# Patient Record
Sex: FEMALE | Race: WHITE | Employment: OTHER | URBAN - METROPOLITAN AREA
[De-identification: names, ages, dates, MRNs, and addresses within clinical notes are randomized per-mention and may not be internally consistent; named-entity substitution may affect disease eponyms.]

---

## 2018-02-20 ENCOUNTER — HOSPITAL ENCOUNTER (OUTPATIENT)
Dept: CT IMAGING | Age: 73
Discharge: HOME | End: 2018-02-20
Attending: SPECIALIST
Payer: MEDICARE

## 2018-02-20 DIAGNOSIS — I25.10 CARDIOVASCULAR ARTERIOSCLEROSIS: ICD-10-CM

## 2018-02-20 DIAGNOSIS — R10.13 EPIGASTRIC PAIN: ICD-10-CM

## 2018-02-20 DIAGNOSIS — R19.4 CHANGE IN BOWEL HABITS: ICD-10-CM

## 2018-02-20 LAB — CREAT BLD-MCNC: 0.9 MG/DL (ref 0.6–1.3)

## 2018-02-20 PROCEDURE — 74177 CT ABD & PELVIS W/CONTRAST: CPT

## 2018-02-20 PROCEDURE — 82565 ASSAY OF CREATININE: CPT

## 2018-02-20 PROCEDURE — 74011000258 HC RX REV CODE- 258: Performed by: SPECIALIST

## 2018-02-20 PROCEDURE — 74011636320 HC RX REV CODE- 636/320: Performed by: SPECIALIST

## 2018-02-20 RX ORDER — SODIUM CHLORIDE 0.9 % (FLUSH) 0.9 %
10 SYRINGE (ML) INJECTION
Status: COMPLETED | OUTPATIENT
Start: 2018-02-20 | End: 2018-02-20

## 2018-02-20 RX ADMIN — IOPAMIDOL 100 ML: 755 INJECTION, SOLUTION INTRAVENOUS at 15:34

## 2018-02-20 RX ADMIN — IOHEXOL 50 ML: 240 INJECTION, SOLUTION INTRATHECAL; INTRAVASCULAR; INTRAVENOUS; ORAL at 15:34

## 2018-02-20 RX ADMIN — Medication 10 ML: at 15:34

## 2018-02-20 RX ADMIN — SODIUM CHLORIDE 100 ML: 900 INJECTION, SOLUTION INTRAVENOUS at 15:34

## 2018-03-01 ENCOUNTER — OFFICE VISIT (OUTPATIENT)
Dept: CARDIOLOGY CLINIC | Age: 73
End: 2018-03-01

## 2018-03-01 VITALS
OXYGEN SATURATION: 98 % | HEART RATE: 64 BPM | RESPIRATION RATE: 16 BRPM | HEIGHT: 67 IN | SYSTOLIC BLOOD PRESSURE: 130 MMHG | BODY MASS INDEX: 24.17 KG/M2 | WEIGHT: 154 LBS | DIASTOLIC BLOOD PRESSURE: 80 MMHG

## 2018-03-01 DIAGNOSIS — E78.00 HYPERCHOLESTEROLEMIA: ICD-10-CM

## 2018-03-01 DIAGNOSIS — R07.2 CHEST PAIN, PRECORDIAL: Primary | ICD-10-CM

## 2018-03-01 DIAGNOSIS — R94.31 ABNORMAL EKG: ICD-10-CM

## 2018-03-01 DIAGNOSIS — R10.13 EPIGASTRIC ABDOMINAL PAIN: ICD-10-CM

## 2018-03-01 RX ORDER — ATORVASTATIN CALCIUM 20 MG/1
20 TABLET, FILM COATED ORAL DAILY
Qty: 30 TAB | Refills: 5 | Status: SHIPPED | OUTPATIENT
Start: 2018-03-01 | End: 2018-04-04 | Stop reason: SDUPTHER

## 2018-03-01 NOTE — PROGRESS NOTES
[facility] Nuclear stress test ordered  Verbal order per Dr. Herron Favor Pravastatin, changed to Lipitor 20 mg, daily  Verbal order per Dr. Whitehead Blissfield

## 2018-03-01 NOTE — PATIENT INSTRUCTIONS
You will be scheduled for a Lexiscan Nuclear Stress Test after your appointment today. For Nuclear Stress Test:  Please wear comfortable clothing (shorts or pants with a shirt or blouse) and walking/athletic shoes. Do not eat or drink anything, except water, for at least 2 hours prior to your test.    Do take your scheduled medications prior to your test.    Do not use any tobacco products for at least 6 hours before your test.    Do not eat or drink anything containing caffeine including chocolate, regular and decaffeinated coffee, soft drinks or tea for at least 12-24 hours before your test.     There were changes made to your medications at this appointment.  Please note the following:  STOP pravastatin  START Lipitor 20 mg, daily

## 2018-03-01 NOTE — MR AVS SNAPSHOT
[address] [address] 
[phone] Patient: Shadi Majano MRN: [id number] ECF:[id number] Visit Information Date & Time Provider Department Dept. Phone Encounter #  
 3/1/2018  1:40 PM Inna Tate MD [facility] [phone] [id number] Upcoming Health Maintenance Date Due DTaP/Tdap/Td series (1 - Tdap) 8/23/1964 BREAST CANCER SCRN MAMMOGRAM 8/23/1993 FOBT Q 1 YEAR AGE 50-75 8/23/1993 ZOSTER VACCINE AGE 60> 6/23/2003 GLAUCOMA SCREENING Q2Y 8/23/2008 OSTEOPOROSIS SCREENING (DEXA) 8/23/2008 MEDICARE YEARLY EXAM 8/23/2008 Pneumococcal 65+ Low/Medium Risk (2 of 2 - PCV13) 2/18/2017 Influenza Age 5 to Adult 8/1/2017 Allergies as of 3/1/2018  Review Complete On: 3/1/2018 By: Arnold Gray Severity Noted Reaction Type Reactions Latex  09/19/2011    Other (comments) Excoriation of skin Latex  02/18/2016    Rash Adhesive  09/19/2011    Other (comments) Excoriation of skin Adhesive  02/18/2016    Rash Excoriates the skin. Current Immunizations  Reviewed on 4/13/2015 Name Date Influenza Vaccine 10/1/2014 Pneumococcal Polysaccharide (PPSV-23) 2/18/2016 Not reviewed this visit You Were Diagnosed With   
  
 Codes Comments Epigastric abdominal pain    -  Primary ICD-10-CM: R10.13 ICD-9-CM: 789.06 Chest pain, precordial     ICD-10-CM: R07.2 ICD-9-CM: 786.51 Hypercholesterolemia     ICD-10-CM: E78.00 ICD-9-CM: 272.0 Vitals BP Pulse Resp Height(growth percentile) Weight(growth percentile) SpO2  
 130/80 (BP 1 Location: Left arm, BP Patient Position: Sitting) 64 16 5' 7\" (1.702 m) 154 lb (69.9 kg) 98% BMI OB Status Smoking Status 24.12 kg/m2 Hysterectomy Former Smoker BMI and BSA Data Body Mass Index Body Surface Area  
 24.12 kg/m 2 1.82 m 2 Preferred Pharmacy Pharmacy Name Phone [facility][id number]- [place] [facility] [place] [facility] [place] [address] [facility] AT [address] [facility] [phone] Your Updated Medication List  
  
   
This list is accurate as of 3/1/18  3:01 PM.  Always use your most recent med list.  
  
  
  
  
 aspirin delayed-release 81 mg tablet Take  by mouth daily. atorvastatin 20 mg tablet Commonly known as:  LIPITOR Take 1 Tab by mouth daily. Cholecalciferol (Vitamin D3) 2,000 unit Cap capsule Commonly known as:  VITAMIN D3  
  
 * cyanocobalamin 1,000 mcg tablet Take 1,000 mcg by mouth two (2) times a day. * cyanocobalamin 1,000 mcg tablet Commonly known as:  VITAMIN B-12 Take 1 Tab by mouth two (2) times a day. FOLIC ACID PO Take 800 mg by mouth. * hydroCHLOROthiazide 25 mg tablet Commonly known as:  HYDRODIURIL Take 25 mg by mouth daily. * hydroCHLOROthiazide 25 mg tablet Commonly known as:  HYDRODIURIL  
1 tab po daily  
  
 metoprolol tartrate 25 mg tablet Commonly known as:  LOPRESSOR Take 1 Tab by mouth daily. omeprazole 20 mg capsule Commonly known as:  PRILOSEC TargAnox 1.5 billion cell Cap Generic drug:  L. gasseri-B. bifidum-B longum  
  
 potassium chloride 20 mEq tablet Commonly known as:  K-DUR, KLOR-CON Take 20 mEq by mouth daily. sertraline 25 mg tablet Commonly known as:  ZOLOFT Take 3 Tabs by mouth daily. * traZODone 50 mg tablet Commonly known as:  [place] Take  by mouth nightly. * traZODone 50 mg tablet Commonly known as:  [place] Take 1 Tab by mouth nightly. VITAMIN B-6 PO Take 50 mg by mouth. * Notice: This list has 6 medication(s) that are the same as other medications prescribed for you. Read the directions carefully, and ask your doctor or other care provider to review them with you. Prescriptions Sent to Pharmacy  Refills  
 atorvastatin (LIPITOR) 20 mg tablet 5  
 Sig: Take 1 Tab by mouth daily. Class: Normal  
 Pharmacy: [facility]/pharmacy #[id number] [address] AT [address] [place] [address] #: [phone] Route: Oral  
  
We Performed the Following AMB POC EKG ROUTINE W/ 12 LEADS, INTER & REP [33245 CPT(R)] To-Do List   
 03/01/2018 ECG:  STRESS TEST LEXISCAN/CARDIOLITE Patient Instructions You will be scheduled for a Lexiscan Nuclear Stress Test after your appointment today. For Nuclear Stress Test: 
Please wear comfortable clothing (shorts or pants with a shirt or blouse) and walking/athletic shoes. Do not eat or drink anything, except water, for at least 2 hours prior to your test. 
 
Do take your scheduled medications prior to your test. 
 
Do not use any tobacco products for at least 6 hours before your test. 
 
Do not eat or drink anything containing caffeine including chocolate, regular and decaffeinated coffee, soft drinks or tea for at least 12-24 hours before your test.  
 
There were changes made to your medications at this appointment. Please note the following: STOP pravastatin START Lipitor 20 mg, daily Introducing [facility]! Dear Amaris Ahmadi: Thank you for requesting a [employer] account. Our records indicate that you already have an active [employer] account. You can access your account anytime at [email or web address] Did you know that you can access your hospital and ER discharge instructions at any time in [employer]? You can also review all of your test results from your hospital stay or ER visit. Additional Information If you have questions, please visit the Frequently Asked Questions section of the [employer] website at [email or web address] Remember, [employer] is NOT to be used for urgent needs. For medical emergencies, dial 911. Now available from your iPhone and Android! Please provide this summary of care documentation to your next provider. Your primary care clinician is listed as Waleska Ennis. If you have any questions after today's visit, please call [phone].

## 2018-03-01 NOTE — PROGRESS NOTES
Bonifacio Cotton [facility]     1945       Sage Pantoja MD, [facility] [place]  Date of Visit-3/1/2018   PCP is Micah Gonzales MD   [facility] [place]  [facility]  HPI:  Sammie Little is a 74 y.o. female   Referred by GI for pain. The pt states that she went to her GI doctor 2 weeks ago for abdominal pain. The pt states that she then went to have a CT scan and was told to come here. She reports that she also gets chest pain. This chest pain is intermittent and happens about once or twice a week. This lasts for about 10-15 minutes. The pt reports that when going up the steps she does get short of breath and also gets chest pain after this. She describes this pain as feeling like someone punched her in the stomach. The pt states that this pain is constant. She is not sure that this pain has radiated into her back or jaw. The pt reports that GI has not been able to figure out the etiology of this pain which is why she was referred here. The pt states that she will be getting an endoscopy at some point. She reports that she is not walking much because of her knee pain and degenerative disc disease. The pt states that she has had stress testing in 2005 and 2014. She reports that she is taking cholesterol medication. The pt is taking pravachol and is not sure that she has been on any other cholesterol medications. Denies edema, syncope or shortness of breath at rest, has no tachycardia, palpitations or sense of arrhythmia. EKG: Sinus rhythm with first degree AV block and late R wave with ST changes. Assessment/Plan:     1. Chest pain intermittent, not related to exertion but some WOLFF related to exertion intermediate probability will plan for a stress test. Will have difficulty exercising due to back and knee problems     2. Hypercholesterolemia, LDL is 131 on pravastatin will change to lipitor 20 mg daily.      3. GI symptoms endoscopy planned w/ GI    No future appointments. Patient Instructions   You will be scheduled for a Lexiscan Nuclear Stress Test after your appointment today. There were changes made to your medications at this appointment. Please note the following:  STOP pravastatin  START Lipitor 20 mg, daily         Key CAD CHF Meds             atorvastatin (LIPITOR) 20 mg tablet  (Taking) Take 1 Tab by mouth daily. metoprolol tartrate (LOPRESSOR) 25 mg tablet  (Taking) Take 1 Tab by mouth daily. aspirin delayed-release 81 mg tablet  (Taking) Take  by mouth daily. hydrochlorothiazide (HYDRODIURIL) 25 mg tablet  (Taking) 1 tab po daily    hydrochlorothiazide (HYDRODIURIL) 25 mg tablet  (Taking) Take 25 mg by mouth daily. Impression:   1. Epigastric abdominal pain    2. Chest pain, precordial    3. Hypercholesterolemia     has living will  Allergy to latex  Father had skins cancer  borther NHL  Father with CHF, brother with mitral valve dz  HTN in mother and father  Drinks cocktail most nights, for 55 years, quit tobacco 2008 was 1ppd      Review of Systems   Constitutional: Negative for weight loss. HENT: Negative for ear pain, nosebleeds and sore throat. Eyes: Negative for blurred vision and double vision. Respiratory: Positive for shortness of breath. Negative for cough, hemoptysis, sputum production and wheezing. Cardiovascular: Positive for chest pain. Negative for palpitations. Gastrointestinal: Positive for diarrhea and heartburn. Negative for abdominal pain, blood in stool, constipation, nausea and vomiting. Genitourinary: Negative for dysuria and frequency. Musculoskeletal: Positive for back pain and joint pain. Negative for neck pain. Skin: Negative for rash. Neurological: Negative for dizziness, sensory change, focal weakness, seizures, loss of consciousness and headaches. Psychiatric/Behavioral: Positive for depression. The patient has insomnia. The patient is not nervous/anxious.      see supplement sheet, initialed and to be scanned by staff  Past Medical History:   Diagnosis Date    Arthritis     DJD    Cancer ([facility] [address]     skin cancer - squamous and basal cell    Cerebellar ataxia (HCC)     GERD (gastroesophageal reflux disease)     History of degenerative disc disease     Hypercholesterolemia     Hypertension     Orthostasis 5/13/2014    Other and unspecified symptoms and signs involving general sensations and perceptions     previous hx \"cerebellar ataxia\"    Other ill-defined conditions(799.89)     increased cholesterol    Other ill-defined conditions(799.89)     fibromyalgia    Psychiatric disorder     depression/anxiety      Social Hx= reports that she quit smoking about 10 years ago. She has never used smokeless tobacco. She reports that she does not drink alcohol or use illicit drugs. Exam and Labs:  /80 (BP 1 Location: Left arm, BP Patient Position: Sitting)  Pulse 64  Resp 16  Ht 5' 7\" (1.702 m)  Wt 154 lb (69.9 kg)  SpO2 98%  BMI 24.12 kg/z3Bdvwndsinnpuup:  NAD, comfortable  Head: NC,AT. Eyes: No scleral icterus. Neck:  Neck supple. No JVD present. Throat: moist mucous membranes. Chest: Effort normal & normal respiratory excursion . Neurological: alert, conversant and oriented . Skin: Skin is not cold. No obvious systemic rash noted. Not diaphoretic. No erythema. Psychiatric:  Grossly normal mood and affect. Behavior appears normal. Extremities:  no clubbing or cyanosis. Abdomen: non distended    Lungs:breath sounds normal. No stridor. distress, wheezes or  Rales. Heart: normal rate, regular rhythm, normal S1, S2, no murmurs, rubs, clicks or gallops , PMI non displaced. Edema: Edema is none.   Lab Results   Component Value Date/Time    Cholesterol, total 182 02/18/2016 04:05 PM    HDL Cholesterol 78 02/18/2016 04:05 PM    LDL, calculated 83 02/18/2016 04:05 PM    Triglyceride 106 02/18/2016 04:05 PM    CHOL/HDL Ratio 2.4 05/13/2014 04:15 AM     Lab Results   Component Value Date/Time    Sodium 141 02/18/2016 04:05 PM    Potassium 5.1 02/18/2016 04:05 PM    Chloride 100 02/18/2016 04:05 PM    CO2 27 02/18/2016 04:05 PM    Anion gap 7 06/28/2015 07:30 PM    Glucose 102 (H) 02/18/2016 04:05 PM    BUN 12 02/18/2016 04:05 PM    Creatinine 0.88 02/18/2016 04:05 PM    BUN/Creatinine ratio 14 02/18/2016 04:05 PM    GFR est AA 76 02/18/2016 04:05 PM    GFR est non-AA 66 02/18/2016 04:05 PM    Calcium 9.1 02/18/2016 04:05 PM      Wt Readings from Last 3 Encounters:   03/01/18 154 lb (69.9 kg)   03/04/16 149 lb (67.6 kg)   02/18/16 154 lb (69.9 kg)      BP Readings from Last 3 Encounters:   03/01/18 130/80   03/04/16 140/78   02/18/16 145/79      Current Outpatient Prescriptions   Medication Sig    traZODone (DESYREL) 50 mg tablet Take 1 Tab by mouth nightly.  metoprolol tartrate (LOPRESSOR) 25 mg tablet Take 1 Tab by mouth daily.  omeprazole (PRILOSEC) 20 mg capsule     [employer] 1.5 billion cell cap     aspirin delayed-release 81 mg tablet Take  by mouth daily.  cyanocobalamin 1,000 mcg tablet Take 1,000 mcg by mouth two (2) times a day.  FOLIC ACID PO Take 827 mg by mouth.  PYRIDOXINE HCL (VITAMIN B-6 PO) Take 50 mg by mouth.  Cholecalciferol, Vitamin D3, (VITAMIN D3) 2,000 unit cap capsule     sertraline (ZOLOFT) 25 mg tablet Take 3 Tabs by mouth daily.  hydrochlorothiazide (HYDRODIURIL) 25 mg tablet 1 tab po daily    traZODone (DESYREL) 50 mg tablet Take  by mouth nightly.  hydrochlorothiazide (HYDRODIURIL) 25 mg tablet Take 25 mg by mouth daily.  cyanocobalamin (VITAMIN B-12) 1,000 mcg tablet Take 1 Tab by mouth two (2) times a day.  potassium chloride (K-DUR, KLOR-CON) 20 mEq tablet Take 20 mEq by mouth daily.  pravastatin (PRAVACHOL) 40 mg tablet Take 40 mg by mouth nightly. No current facility-administered medications for this visit. Impression see above.       Written by Saloni Johns, as dictated by Jenn Rajput Concepción Larkin MD.

## 2018-03-13 ENCOUNTER — CLINICAL SUPPORT (OUTPATIENT)
Dept: CARDIOLOGY CLINIC | Age: 73
End: 2018-03-13

## 2018-03-13 DIAGNOSIS — E78.00 HYPERCHOLESTEROLEMIA: ICD-10-CM

## 2018-03-13 DIAGNOSIS — R06.02 SHORTNESS OF BREATH: ICD-10-CM

## 2018-03-13 DIAGNOSIS — R07.9 CHEST PAIN, UNSPECIFIED TYPE: ICD-10-CM

## 2018-03-13 DIAGNOSIS — R10.13 EPIGASTRIC ABDOMINAL PAIN: ICD-10-CM

## 2018-03-13 DIAGNOSIS — R07.2 CHEST PAIN, PRECORDIAL: ICD-10-CM

## 2018-03-13 NOTE — PROGRESS NOTES
See scanned document. Ordering Doctor:Dr. Donna Olguin  Reading Doctor:Dr. Donna Olguin    Patient tolerated procedure well.

## 2018-03-25 PROBLEM — R94.31 ABNORMAL EKG: Status: ACTIVE | Noted: 2018-03-25

## 2018-04-04 RX ORDER — ATORVASTATIN CALCIUM 20 MG/1
20 TABLET, FILM COATED ORAL DAILY
Qty: 90 TAB | Refills: 1 | Status: SHIPPED | OUTPATIENT
Start: 2018-04-04 | End: 2018-04-19 | Stop reason: SDUPTHER

## 2018-04-04 NOTE — TELEPHONE ENCOUNTER
Request for Atorvastatin 20 mg tab, nightly. Last office visit 3/1/18,   Next office visit none scheduled. 90 day supply requested     Refills per verbal order from Dr. Reno Armstrong.

## 2018-04-10 ENCOUNTER — ANESTHESIA (OUTPATIENT)
Dept: ENDOSCOPY | Age: 73
End: 2018-04-10
Payer: MEDICARE

## 2018-04-10 ENCOUNTER — HOSPITAL ENCOUNTER (OUTPATIENT)
Age: 73
Setting detail: OUTPATIENT SURGERY
Discharge: HOME | End: 2018-04-10
Attending: SPECIALIST | Admitting: SPECIALIST
Payer: MEDICARE

## 2018-04-10 ENCOUNTER — ANESTHESIA EVENT (OUTPATIENT)
Dept: ENDOSCOPY | Age: 73
End: 2018-04-10
Payer: MEDICARE

## 2018-04-10 VITALS
TEMPERATURE: 98.2 F | RESPIRATION RATE: 14 BRPM | BODY MASS INDEX: 23.99 KG/M2 | HEART RATE: 69 BPM | WEIGHT: 153.2 LBS | SYSTOLIC BLOOD PRESSURE: 123 MMHG | OXYGEN SATURATION: 97 % | DIASTOLIC BLOOD PRESSURE: 70 MMHG

## 2018-04-10 PROCEDURE — 76060000031 HC ANESTHESIA FIRST 0.5 HR: Performed by: SPECIALIST

## 2018-04-10 PROCEDURE — 36415 COLL VENOUS BLD VENIPUNCTURE: CPT | Performed by: SPECIALIST

## 2018-04-10 PROCEDURE — 77030009426 HC FCPS BIOP ENDOSC BSC -B: Performed by: SPECIALIST

## 2018-04-10 PROCEDURE — 82784 ASSAY IGA/IGD/IGG/IGM EACH: CPT | Performed by: SPECIALIST

## 2018-04-10 PROCEDURE — 88342 IMHCHEM/IMCYTCHM 1ST ANTB: CPT | Performed by: SPECIALIST

## 2018-04-10 PROCEDURE — 88305 TISSUE EXAM BY PATHOLOGIST: CPT | Performed by: SPECIALIST

## 2018-04-10 PROCEDURE — 74011250636 HC RX REV CODE- 250/636

## 2018-04-10 PROCEDURE — 74011000250 HC RX REV CODE- 250

## 2018-04-10 PROCEDURE — 76040000019: Performed by: SPECIALIST

## 2018-04-10 RX ORDER — LORAZEPAM 2 MG/ML
2 INJECTION INTRAMUSCULAR AS NEEDED
Status: CANCELLED | OUTPATIENT
Start: 2018-04-10 | End: 2018-04-10

## 2018-04-10 RX ORDER — DEXTROMETHORPHAN/PSEUDOEPHED 2.5-7.5/.8
1.2 DROPS ORAL
Status: CANCELLED | OUTPATIENT
Start: 2018-04-10

## 2018-04-10 RX ORDER — FENTANYL CITRATE 50 UG/ML
200 INJECTION, SOLUTION INTRAMUSCULAR; INTRAVENOUS
Status: CANCELLED | OUTPATIENT
Start: 2018-04-10 | End: 2018-04-10

## 2018-04-10 RX ORDER — SODIUM CHLORIDE 0.9 % (FLUSH) 0.9 %
5-10 SYRINGE (ML) INJECTION AS NEEDED
Status: CANCELLED | OUTPATIENT
Start: 2018-04-10 | End: 2018-04-10

## 2018-04-10 RX ORDER — NALOXONE HYDROCHLORIDE 0.4 MG/ML
0.4 INJECTION, SOLUTION INTRAMUSCULAR; INTRAVENOUS; SUBCUTANEOUS
Status: CANCELLED | OUTPATIENT
Start: 2018-04-10 | End: 2018-04-10

## 2018-04-10 RX ORDER — SODIUM CHLORIDE 9 MG/ML
50 INJECTION, SOLUTION INTRAVENOUS CONTINUOUS
Status: CANCELLED | OUTPATIENT
Start: 2018-04-10 | End: 2018-04-10

## 2018-04-10 RX ORDER — ATROPINE SULFATE 0.1 MG/ML
0.5 INJECTION INTRAVENOUS
Status: CANCELLED | OUTPATIENT
Start: 2018-04-10 | End: 2018-04-10

## 2018-04-10 RX ORDER — OXYBUTYNIN CHLORIDE 5 MG/1
5 TABLET ORAL 3 TIMES DAILY
COMMUNITY
End: 2019-06-11

## 2018-04-10 RX ORDER — SODIUM CHLORIDE 0.9 % (FLUSH) 0.9 %
5-10 SYRINGE (ML) INJECTION EVERY 8 HOURS
Status: CANCELLED | OUTPATIENT
Start: 2018-04-10 | End: 2018-04-10

## 2018-04-10 RX ORDER — EPINEPHRINE 0.1 MG/ML
1 INJECTION INTRACARDIAC; INTRAVENOUS
Status: CANCELLED | OUTPATIENT
Start: 2018-04-10 | End: 2018-04-10

## 2018-04-10 RX ORDER — GLYCOPYRROLATE 0.2 MG/ML
INJECTION INTRAMUSCULAR; INTRAVENOUS AS NEEDED
Status: DISCONTINUED | OUTPATIENT
Start: 2018-04-10 | End: 2018-04-10 | Stop reason: HOSPADM

## 2018-04-10 RX ORDER — MIDAZOLAM HYDROCHLORIDE 1 MG/ML
.25-1 INJECTION, SOLUTION INTRAMUSCULAR; INTRAVENOUS
Status: CANCELLED | OUTPATIENT
Start: 2018-04-10 | End: 2018-04-10

## 2018-04-10 RX ORDER — LIDOCAINE HYDROCHLORIDE 20 MG/ML
INJECTION, SOLUTION EPIDURAL; INFILTRATION; INTRACAUDAL; PERINEURAL AS NEEDED
Status: DISCONTINUED | OUTPATIENT
Start: 2018-04-10 | End: 2018-04-10 | Stop reason: HOSPADM

## 2018-04-10 RX ORDER — PROPOFOL 10 MG/ML
INJECTION, EMULSION INTRAVENOUS AS NEEDED
Status: DISCONTINUED | OUTPATIENT
Start: 2018-04-10 | End: 2018-04-10 | Stop reason: HOSPADM

## 2018-04-10 RX ORDER — DEXMEDETOMIDINE HYDROCHLORIDE 4 UG/ML
INJECTION, SOLUTION INTRAVENOUS AS NEEDED
Status: DISCONTINUED | OUTPATIENT
Start: 2018-04-10 | End: 2018-04-10 | Stop reason: HOSPADM

## 2018-04-10 RX ORDER — SODIUM CHLORIDE 9 MG/ML
INJECTION, SOLUTION INTRAVENOUS
Status: DISCONTINUED | OUTPATIENT
Start: 2018-04-10 | End: 2018-04-10 | Stop reason: HOSPADM

## 2018-04-10 RX ORDER — FLUMAZENIL 0.1 MG/ML
0.2 INJECTION INTRAVENOUS
Status: CANCELLED | OUTPATIENT
Start: 2018-04-10 | End: 2018-04-10

## 2018-04-10 RX ADMIN — LIDOCAINE HYDROCHLORIDE 80 MG: 20 INJECTION, SOLUTION EPIDURAL; INFILTRATION; INTRACAUDAL; PERINEURAL at 13:53

## 2018-04-10 RX ADMIN — GLYCOPYRROLATE 0.2 MG: 0.2 INJECTION INTRAMUSCULAR; INTRAVENOUS at 13:53

## 2018-04-10 RX ADMIN — DEXMEDETOMIDINE HYDROCHLORIDE 10 MCG: 4 INJECTION, SOLUTION INTRAVENOUS at 13:54

## 2018-04-10 RX ADMIN — PROPOFOL 50 MG: 10 INJECTION, EMULSION INTRAVENOUS at 13:53

## 2018-04-10 RX ADMIN — DEXMEDETOMIDINE HYDROCHLORIDE 10 MCG: 4 INJECTION, SOLUTION INTRAVENOUS at 13:53

## 2018-04-10 RX ADMIN — SODIUM CHLORIDE: 9 INJECTION, SOLUTION INTRAVENOUS at 13:35

## 2018-04-10 RX ADMIN — PROPOFOL 50 MG: 10 INJECTION, EMULSION INTRAVENOUS at 13:58

## 2018-04-10 RX ADMIN — PROPOFOL 50 MG: 10 INJECTION, EMULSION INTRAVENOUS at 13:56

## 2018-04-10 RX ADMIN — PROPOFOL 50 MG: 10 INJECTION, EMULSION INTRAVENOUS at 14:00

## 2018-04-10 NOTE — ANESTHESIA POSTPROCEDURE EVALUATION
Post-Anesthesia Evaluation and Assessment    Patient: Ralf Sanchez MRN: [id number]  SSN: [id number]    YOB: 1945  Age: 74 y.o. Sex: female       Cardiovascular Function/Vital Signs  Visit Vitals    /76    Pulse 75    Temp 36.8 °C (98.2 °F)    Resp 16    Wt 69.5 kg (153 lb 3.2 oz)    SpO2 94%    BMI 23.99 kg/m2       Patient is status post MAC anesthesia for Procedure(s):  ESOPHAGOGASTRODUODENOSCOPY (EGD)  ESOPHAGOGASTRODUODENAL (EGD) BIOPSY. Nausea/Vomiting: None    Postoperative hydration reviewed and adequate. Pain:  Pain Scale 1: Numeric (0 - 10) (04/10/18 1440)  Pain Intensity 1: 0 (04/10/18 1440)   Managed    Neurological Status: At baseline    Mental Status and Level of Consciousness: Arousable    Pulmonary Status:   O2 Device: Room air (04/10/18 1440)   Adequate oxygenation and airway patent    Complications related to anesthesia: None    Post-anesthesia assessment completed.  No concerns    Signed By: Ori Enriquez MD     April 10, 2018

## 2018-04-10 NOTE — PROCEDURES
EGD Procedure Note    Indications:  Abdominal pain, epigastric, GERD, Hx of gastroparesis, diarrhea   Referring Physician: Nicky Lorenz MD   Anesthesia/Sedation:MAC  Endoscopist:  Dr. Juany Fox  Assistant:  Endoscopy Technician-1: Terrence Rhodes  Endoscopy RN-1: Geovanna Kaba RN    Preoperative diagnosis: egd    Postoperative diagnosis: Hiatal Hernia  Atrophic gastritis      Procedure in Detail:  Informed consent was obtained for the procedure, including sedation. Risks of perforation, hemorrhage, adverse drug reaction, and aspiration were discussed. The patient was placed in the left lateral decubitus position. Based on the pre-procedure assessment, including review of the patient's medical history, medications, allergies, and review of systems, she had been deemed to be an appropriate candidate for moderate sedation; she was therefore sedated with the medications listed above. The patient was monitored continuously with ECG tracing, pulse oximetry, blood pressure monitoring, and direct observations. An Olympus video endoscope was inserted into the mouth and advanced under direct vision to into the esophagus, then stomach and duodenum. A careful inspection was made as the gastroscope was withdrawn, including a retroflexed view of the proximal stomach; findings and interventions are described below. Findings:   Esophagus:tortuous  Stomach: 6-7 cm hiatal hernia, mild atrophic gastritis - Bx from antrum and body -stain for H.pylori  Duodenum/jejunum: small duodenal diverticulum    Therapies:  See above    Specimens: see above           EBL: None    Complications:   None; patient tolerated the procedure well. Recommendations:  -Acid suppression with a proton pump inhibitor. , -Await pathology. , -Await RAFAELA test result and treat for Helicobacter pylori if positive. , -Follow up with me., -No NSAIDS, -call office to do stool studies, schedule for colonoscopy    Trupti Wilde Negro Dickson MD

## 2018-04-10 NOTE — IP AVS SNAPSHOT
[address] 
[phone] Patient: Tadeo Hager MRN: [id number] About your hospitalization You were admitted on:  April 10, 2018 You last received care in the:  [facility] ENDOSCOPY You were discharged on:  April 10, 2018 Why you were hospitalized Your primary diagnosis was:  Not on File Follow-up Information None Discharge Orders None A check lenore indicates which time of day the medication should be taken. My Medications CHANGE how you take these medications Instructions Each Dose to Equal  
 Morning Noon Evening Bedtime  
 sertraline 25 mg tablet Commonly known as:  ZOLOFT What changed:  how much to take Your last dose was: Your next dose is: Take 3 Tabs by mouth daily. 75 mg CONTINUE taking these medications Instructions Each Dose to Equal  
 Morning Noon Evening Bedtime  
 aspirin delayed-release 81 mg tablet Your last dose was: Your next dose is: Take  by mouth daily. atorvastatin 20 mg tablet Commonly known as:  LIPITOR Your last dose was: Your next dose is: Take 1 Tab by mouth daily. 20 mg Cholecalciferol (Vitamin D3) 2,000 unit Cap capsule Commonly known as:  VITAMIN D3 Your last dose was: Your next dose is: * cyanocobalamin 1,000 mcg tablet Your last dose was: Your next dose is: Take 1,000 mcg by mouth two (2) times a day. 1000 mcg * cyanocobalamin 1,000 mcg tablet Commonly known as:  VITAMIN B-12 Your last dose was: Your next dose is: Take 1 Tab by mouth two (2) times a day. 1000 mcg FOLIC ACID PO Your last dose was: Your next dose is: Take 800 mg by mouth. 800 mg  
    
   
   
   
  
 * hydroCHLOROthiazide 25 mg tablet Commonly known as:  HYDRODIURIL Your last dose was: Your next dose is: Take 25 mg by mouth daily. 25 mg  
    
   
   
   
  
 * hydroCHLOROthiazide 25 mg tablet Commonly known as:  HYDRODIURIL Your last dose was: Your next dose is:    
   
   
 1 tab po daily  
     
   
   
   
  
 metoprolol tartrate 25 mg tablet Commonly known as:  LOPRESSOR Your last dose was: Your next dose is: Take 1 Tab by mouth daily. 25 mg  
    
   
   
   
  
 omeprazole 20 mg capsule Commonly known as:  PRILOSEC Your last dose was: Your next dose is:    
   
   
      
   
   
   
  
 oxybutynin 5 mg tablet Commonly known as:  CLHNUEOE Your last dose was: Your next dose is: Take 5 mg by mouth three (3) times daily. 5 mg [employer] 1.5 billion cell Cap Generic drug:  L. gasseri-B. bifidum-B longum Your last dose was: Your next dose is:    
   
   
      
   
   
   
  
 potassium chloride 20 mEq tablet Commonly known as:  K-DUR, KLOR-CON Your last dose was: Your next dose is: Take 20 mEq by mouth daily. 20 mEq * traZODone 50 mg tablet Commonly known as:  Ariana Aguirre Your last dose was: Your next dose is: Take  by mouth nightly. * traZODone 50 mg tablet Commonly known as:  Ariana Aguirre Your last dose was: Your next dose is: Take 1 Tab by mouth nightly. 50 mg  
    
   
   
   
  
 VITAMIN B-6 PO Your last dose was: Your next dose is: Take 50 mg by mouth. 50 mg  
    
   
   
   
  
 * Notice:   This list has 6 medication(s) that are the same as other medications prescribed for you. Read the directions carefully, and ask your doctor or other care provider to review them with you. Discharge Instructions Tess Jamil [id number] 
1945 EGD DISCHARGE INSTRUCTIONS Discomfort: 
Sore throat- throat lozenges or warm salt water gargle 
redness at IV site- apply warm compress to area; if redness or soreness persist- contact your physician Gaseous discomfort- walking, belching will help relieve any discomfort You may not operate a vehicle for 12 hours You may not engage in an occupation involving machinery or appliances for rest of today. You may not drink alcoholic beverages for at least 12 hours Avoid making any critical decisions for at least 24 hour DIET You may resume your regular diet  however -  remember your colon is empty and a heavy meal will produce gas. Avoid these foods:  vegetables, fried / greasy foods, carbonated drinks ACTIVITY You may resume your normal daily activities. Spend the remainder of the day resting -  avoid any strenuous activity. CALL M.D. ANY SIGN OF Increasing pain, nausea, vomiting Abdominal distension (swelling) New increased bleeding (oral or rectal) Fever (chills) Pain in chest area Bloody discharge from nose or mouth Shortness of breath You may not take any Advil, Aspirin, Ibuprofen, Motrin, Aleve, or Goodys, ONLY  Tylenol as needed for pain. Follow-up Instructions: 
 Call Dr. Soto Both office to make appointment Office telephone for problems or questions [phone] Results of procedure / biopsy in 10-14 days  
-Acid suppression with a proton pump inhibitor. , -Await pathology. , -Await RAFAELA test result and treat for Helicobacter pylori if positive. , -Follow up with me., -No NSAIDS, -call office to do stool studies,schedule UGI, X-rays schedule for colonoscopy Introducing [facility]! [facility]: Thank you for requesting a [employer] account. Our records indicate that you already have an active [employer] account. You can access your account anytime at [email or web address]. [employer] Did you know that you can access your hospital and ER discharge instructions at any time in [employer]? You can also review all of your test results from your hospital stay or ER visit. Additional Information If you have questions, please visit the Frequently Asked Questions section of the [employer] website at [email or web address] [employer]/. Remember, [employer] is NOT to be used for urgent needs. For medical emergencies, dial 911. Now available from your iPhone and Android! Introducing Gopal Arriola As a [employer] patient, I wanted to make you aware of our electronic visit tool called Gopal Arriola. [employer] 24/7 allows you to connect within minutes with a medical provider 24 hours a day, seven days a week via a mobile device or tablet or logging into a secure website from your computer. You can access Gopal Onofrefin from anywhere in the [place]. A virtual visit might be right for you when you have a simple condition and feel like you just dont want to get out of bed, or cant get away from work for an appointment, when your regular [employer] provider is not available (evenings, weekends or holidays), or when youre out of town and need minor care. Electronic visits cost only $49 and if the [employer] 24/7 provider determines a prescription is needed to treat your condition, one can be electronically transmitted to a nearby pharmacy*. Please take a moment to enroll today if you have not already done so. The enrollment process is free and takes just a few minutes. To enroll, please download the [employer] 24/7 alfredo to your tablet or phone, or visit [email or web address] to enroll on your computer. And, as an [address] [place] [address] patient with a [employer] account, the results of your visits will be scanned into your electronic medical record and your primary care provider will be able to view the scanned results. We urge you to continue to see your regular [employer] provider for your ongoing medical care. And while your primary care provider may not be the one available when you seek a Gopal Onofrefin virtual visit, the peace of mind you get from getting a real diagnosis real time can be priceless. For more information on Gopal SoPostclifffin, view our Frequently Asked Questions (FAQs) at [email or web address]. Sincerely, 
 
Marci Zamora MD 
Chief Medical Officer Deric Sammie Benitez *:  certain medications cannot be prescribed via [employer] Providers Seen During Your Hospitalization Provider Specialty Primary office phone Debra Morales MD Gastroenterology [phone] Your Primary Care Physician (PCP) Primary Care Physician Office Phone Office Fax [address] [address] [phone] You are allergic to the following Allergen Reactions Latex Other (comments) Excoriation of skin Latex Rash Adhesive Other (comments) Excoriation of skin Adhesive Rash Excoriates the skin. Recent Documentation Weight BMI OB Status Smoking Status 69.5 kg 23.99 kg/m2 Hysterectomy Former Smoker Emergency Contacts Name Discharge Info Relation Home Work Mobile RobsonmichelaWilliam DISCHARGE CAREGIVER [3] Child [2] [phone] Patient Belongings The following personal items are in your possession at time of discharge: 
  Dental Appliances: At bedside  Visual Aid: None Please provide this summary of care documentation to your next provider.  
  
  
 
  
Signatures-by signing, you are acknowledging that this After Visit Summary has been reviewed with you and you have received a copy. Patient Signature:  ____________________________________________________________ Date:  ____________________________________________________________  
  
Dewane Salen Provider Signature:  ____________________________________________________________ Date:  ____________________________________________________________

## 2018-04-10 NOTE — ROUTINE PROCESS
Josie Singh  1945  [id number]    Situation:  Verbal report received from: Tj Reed RN  Procedure: Procedure(s):  ESOPHAGOGASTRODUODENOSCOPY (EGD)  ESOPHAGOGASTRODUODENAL (EGD) BIOPSY    Background:    Preoperative diagnosis: egd  Postoperative diagnosis: Hiatal Hernia  Atrophic gastritis    :  Dr. Rosa Churchill  Assistant(s): Endoscopy Technician-1: Kris Reese  Endoscopy RN-1: Talha Summers RN    Specimens:   ID Type Source Tests Collected by Time Destination   1 : Antrum bx (stain for h pylori) Arabella Cuellar MD 4/10/2018 1404 Pathology   2 : Gastric body bx (stain for h pylori) Arabella Cuellar MD 4/10/2018 1408 Pathology     H. Pylori  no    Assessment:  Intra-procedure medications   Anesthesia gave intra-procedure sedation and medications, see anesthesia flow sheet yes    Intravenous fluids: NS@ KVO     Vital signs stable     Abdominal assessment: round and soft     Recommendation:  Discharge patient per MD order.   Family   Permission to share finding with family or friend yes

## 2018-04-10 NOTE — H&P
Pre-endoscopy H and P    The patient was seen and examined. The airway was assessed and documented. The problem list, past medical history, and medications were reviewed. Patient Active Problem List   Diagnosis Code    MDD (major depressive disorder), recurrent, in partial remission ([facility] F33.41    Falls W19. XXXA    Encephalopathy, unspecified G93.40    Orthostasis I95.1    Brachial neuritis or radiculitis NOS M54.12    Abnormal EKG R94.31     Social History     Social History    Marital status:      Spouse name: N/A    Number of children: N/A    Years of education: N/A     Occupational History    Not on file. Social History Main Topics    Smoking status: Former Smoker     Quit date: 1/19/2008    Smokeless tobacco: Never Used      Comment: quit smoking cigarettes    Alcohol use No    Drug use: No    Sexual activity: Not on file     Other Topics Concern    Not on file     Social History Narrative    ** Merged History Encounter **          Past Medical History:   Diagnosis Date    Arthritis     DJD    Cancer ([facility]     skin cancer - squamous and basal cell    Cerebellar ataxia (HCC)     GERD (gastroesophageal reflux disease)     History of degenerative disc disease     Hypercholesterolemia     Lexiscan 3-13-18 WNL EF 80% [facility]    Hypertension     Orthostasis 5/13/2014    Other and unspecified symptoms and signs involving general sensations and perceptions     previous hx \"cerebellar ataxia\"    Other ill-defined conditions(799.89)     fibromyalgia    Psychiatric disorder     depression/anxiety     The patient has a family history of na    Prior to Admission Medications   Prescriptions Last Dose Informant Patient Reported? Taking? Cholecalciferol, Vitamin D3, (VITAMIN D3) 2,000 unit cap capsule 4/9/2018 at Unknown time  Yes Yes   FOLIC ACID PO 2/4/6176 at Unknown time  Yes Yes   Sig: Take 800 mg by mouth.    [employer] 1.5 billion cell cap 4/9/2018 at Unknown time  Yes Yes   PYRIDOXINE HCL (VITAMIN B-6 PO) 2018 at Unknown time  Yes Yes   Sig: Take 50 mg by mouth. aspirin delayed-release 81 mg tablet 2018 at Unknown time  Yes Yes   Sig: Take  by mouth daily. atorvastatin (LIPITOR) 20 mg tablet 2018 at Unknown time  No Yes   Sig: Take 1 Tab by mouth daily. cyanocobalamin (VITAMIN B-12) 1,000 mcg tablet 2018 at Unknown time  No Yes   Sig: Take 1 Tab by mouth two (2) times a day. cyanocobalamin 1,000 mcg tablet 2018 at Unknown time  Yes Yes   Sig: Take 1,000 mcg by mouth two (2) times a day. hydrochlorothiazide (HYDRODIURIL) 25 mg tablet 2018 at Unknown time  Yes Yes   Sig: Take 25 mg by mouth daily. hydrochlorothiazide (HYDRODIURIL) 25 mg tablet   No No   Si tab po daily   metoprolol tartrate (LOPRESSOR) 25 mg tablet 2018 at Unknown time  No Yes   Sig: Take 1 Tab by mouth daily. omeprazole (PRILOSEC) 20 mg capsule 2018 at Unknown time  Yes Yes   oxybutynin (DITROPAN) 5 mg tablet 2018 at Unknown time  Yes Yes   Sig: Take 5 mg by mouth three (3) times daily. potassium chloride (K-DUR, KLOR-CON) 20 mEq tablet 2018 at Unknown time  Yes Yes   Sig: Take 20 mEq by mouth daily. sertraline (ZOLOFT) 25 mg tablet 2018 at Unknown time  No Yes   Sig: Take 3 Tabs by mouth daily. Patient taking differently: Take 100 mg by mouth daily. traZODone (DESYREL) 50 mg tablet 2018 at Unknown time  Yes Yes   Sig: Take  by mouth nightly. traZODone (DESYREL) 50 mg tablet   No No   Sig: Take 1 Tab by mouth nightly. Facility-Administered Medications: None         The review of systems is:  negative for shortness of breath or chest pain      The heart, lungs and mental status were satisfactory for the administration of MAC sedation and for the procedure. Mallampati score: See Anesthesia. I discussed with the patient the objectives, risks, consequences and alternatives to the procedure.       Plan: Endoscopic procedure with MAC sedation.     Teja Núñez MD  4/10/2018  1:45 PM

## 2018-04-10 NOTE — DISCHARGE INSTRUCTIONS
Tadeo Hager  [id number]  1945    EGD DISCHARGE INSTRUCTIONS  Discomfort:  Sore throat- throat lozenges or warm salt water gargle  redness at IV site- apply warm compress to area; if redness or soreness persist- contact your physician  Gaseous discomfort- walking, belching will help relieve any discomfort  You may not operate a vehicle for 12 hours  You may not engage in an occupation involving machinery or appliances for rest of today. You may not drink alcoholic beverages for at least 12 hours  Avoid making any critical decisions for at least 24 hour  DIET  You may resume your regular diet - however -  remember your colon is empty and a heavy meal will produce gas. Avoid these foods:  vegetables, fried / greasy foods, carbonated drinks  ACTIVITY  You may resume your normal daily activities. Spend the remainder of the day resting -  avoid any strenuous activity. CALL M.D. ANY SIGN OF   Increasing pain, nausea, vomiting  Abdominal distension (swelling)  New increased bleeding (oral or rectal)  Fever (chills)  Pain in chest area  Bloody discharge from nose or mouth  Shortness of breath    You may not take any Advil, Aspirin, Ibuprofen, Motrin, Aleve, or Goodys, ONLY  Tylenol as needed for pain. Follow-up Instructions:   Call Dr. Goldstein Late office to make appointment  Office telephone for problems or questions [phone]  Results of procedure / biopsy in 10-14 days   -Acid suppression with a proton pump inhibitor. , -Await pathology. , -Await RAFAELA test result and treat for Helicobacter pylori if positive. , -Follow up with me., -No NSAIDS, -call office to do stool studies,schedule UGI, X-rays schedule for colonoscopy

## 2018-04-10 NOTE — ANESTHESIA PREPROCEDURE EVALUATION
Anesthetic History               Review of Systems / Medical History  Patient summary reviewed, nursing notes reviewed and pertinent labs reviewed    Pulmonary                   Neuro/Psych         Psychiatric history     Cardiovascular    Hypertension: well controlled              Exercise tolerance: >4 METS  Comments: Normal nuc stress test   GI/Hepatic/Renal     GERD           Endo/Other        Arthritis     Other Findings            Physical Exam    Airway  Mallampati: I  TM Distance: > 6 cm  Neck ROM: normal range of motion   Mouth opening: Normal     Cardiovascular    Rhythm: regular  Rate: normal         Dental    Dentition: Full upper dentures     Pulmonary  Breath sounds clear to auscultation               Abdominal         Other Findings            Anesthetic Plan    ASA: 2  Anesthesia type: MAC          Induction: Intravenous  Anesthetic plan and risks discussed with: Patient

## 2018-04-12 LAB
GLIADIN PEPTIDE IGA SER-ACNC: 6 UNITS (ref 0–19)
GLIADIN PEPTIDE IGG SER-ACNC: 2 UNITS (ref 0–19)
IGA SERPL-MCNC: 266 MG/DL (ref 64–422)
TTG IGA SER-ACNC: <2 U/ML (ref 0–3)
TTG IGG SER-ACNC: <2 U/ML (ref 0–5)

## 2018-04-18 RX ORDER — ATORVASTATIN CALCIUM 20 MG/1
20 TABLET, FILM COATED ORAL DAILY
Qty: 90 TAB | Refills: 1 | Status: CANCELLED | OUTPATIENT
Start: 2018-04-18

## 2018-04-19 RX ORDER — ATORVASTATIN CALCIUM 20 MG/1
20 TABLET, FILM COATED ORAL DAILY
Qty: 90 TAB | Refills: 1 | Status: SHIPPED | OUTPATIENT
Start: 2018-04-19 | End: 2018-04-19

## 2018-04-19 RX ORDER — ATORVASTATIN CALCIUM 20 MG/1
20 TABLET, FILM COATED ORAL DAILY
Qty: 90 TAB | Refills: 1 | Status: SHIPPED | OUTPATIENT
Start: 2018-04-19 | End: 2019-01-02 | Stop reason: SDUPTHER

## 2018-04-19 RX ORDER — ATORVASTATIN CALCIUM 20 MG/1
20 TABLET, FILM COATED ORAL DAILY
Qty: 90 TAB | Refills: 1 | Status: SHIPPED | OUTPATIENT
Start: 2018-04-19 | End: 2018-04-19 | Stop reason: SDUPTHER

## 2018-04-19 NOTE — TELEPHONE ENCOUNTER
Requested Prescriptions     Signed Prescriptions Disp Refills    atorvastatin (LIPITOR) 20 mg tablet 90 Tab 1     Sig: Take 1 Tab by mouth daily. Authorizing Provider: Valentine Terrazas     Ordering User: Hammad Ambrocio     Verbal order per Dr. Jose Gutierrez.      No follow up visit scheduled.

## 2018-04-19 NOTE — TELEPHONE ENCOUNTER
Requested Prescriptions     Signed Prescriptions Disp Refills    atorvastatin (LIPITOR) 20 mg tablet 90 Tab 1     Sig: Take 1 Tab by mouth daily.      Authorizing Provider: Janeice Castleman     Ordering User: Lowanda Keepers     [facility]

## 2018-04-19 NOTE — TELEPHONE ENCOUNTER
[phone],,,DG called back today requesting selected medication atorvastatin 20mg refill go to [facility] not [facility].

## 2018-06-06 ENCOUNTER — HOSPITAL ENCOUNTER (OUTPATIENT)
Dept: GENERAL RADIOLOGY | Age: 73
Discharge: HOME | End: 2018-06-06
Attending: SPECIALIST
Payer: MEDICARE

## 2018-06-06 DIAGNOSIS — R19.4 CHANGE IN BOWEL HABITS: ICD-10-CM

## 2018-06-06 DIAGNOSIS — R19.7 DIARRHEA: ICD-10-CM

## 2018-06-06 DIAGNOSIS — K57.90 DD (DIVERTICULAR DISEASE): ICD-10-CM

## 2018-06-06 PROCEDURE — 74247 XR UPPER GI W KUB AIR CONT: CPT

## 2018-06-06 PROCEDURE — 74241 XR UPPER GI SERIES W KUB: CPT

## 2019-01-03 RX ORDER — ATORVASTATIN CALCIUM 20 MG/1
TABLET, FILM COATED ORAL
Qty: 90 TAB | Refills: 0 | Status: SHIPPED | OUTPATIENT
Start: 2019-01-03 | End: 2019-06-11

## 2019-01-03 NOTE — TELEPHONE ENCOUNTER
Request for Lipitor 20mg daily. Last office visit 3-1-18, next office visit needs to be scheduled, left message with pharmacy.  Refills per verbal order from Dr. Jose Gutierrez.

## 2019-06-11 ENCOUNTER — ANESTHESIA EVENT (OUTPATIENT)
Dept: ENDOSCOPY | Age: 74
End: 2019-06-11
Payer: MEDICARE

## 2019-06-11 RX ORDER — OMEPRAZOLE 40 MG/1
40 CAPSULE, DELAYED RELEASE ORAL DAILY
COMMUNITY
End: 2022-08-09

## 2019-06-11 RX ORDER — BIOTIN 1 MG
1000 TABLET ORAL
COMMUNITY
End: 2022-04-27

## 2019-06-11 RX ORDER — ATORVASTATIN CALCIUM 20 MG/1
20 TABLET, FILM COATED ORAL
COMMUNITY

## 2019-06-11 RX ORDER — SERTRALINE HYDROCHLORIDE 100 MG/1
100 TABLET, FILM COATED ORAL DAILY
COMMUNITY
End: 2022-04-27

## 2019-06-11 NOTE — ANESTHESIA PREPROCEDURE EVALUATION
Relevant Problems   No relevant active problems       Anesthetic History   No history of anesthetic complications            Review of Systems / Medical History  Patient summary reviewed, nursing notes reviewed and pertinent labs reviewed    Pulmonary  Within defined limits                 Neuro/Psych         Psychiatric history     Cardiovascular    Hypertension          Hyperlipidemia         GI/Hepatic/Renal     GERD           Endo/Other        Arthritis     Other Findings              Physical Exam    Airway  Mallampati: I  TM Distance: > 6 cm  Neck ROM: normal range of motion   Mouth opening: Normal     Cardiovascular  Regular rate and rhythm,  S1 and S2 normal,  no murmur, click, rub, or gallop             Dental    Dentition: Full lower dentures and Full upper dentures     Pulmonary  Breath sounds clear to auscultation               Abdominal  GI exam deferred       Other Findings            Anesthetic Plan    ASA: 2  Anesthesia type: MAC            Anesthetic plan and risks discussed with: Patient

## 2019-06-12 ENCOUNTER — ANESTHESIA (OUTPATIENT)
Dept: ENDOSCOPY | Age: 74
End: 2019-06-12
Payer: MEDICARE

## 2019-06-12 ENCOUNTER — HOSPITAL ENCOUNTER (OUTPATIENT)
Age: 74
Setting detail: OUTPATIENT SURGERY
Discharge: HOME | End: 2019-06-12
Attending: SPECIALIST | Admitting: SPECIALIST
Payer: MEDICARE

## 2019-06-12 VITALS
DIASTOLIC BLOOD PRESSURE: 74 MMHG | WEIGHT: 148.4 LBS | TEMPERATURE: 98.1 F | RESPIRATION RATE: 17 BRPM | HEIGHT: 66 IN | BODY MASS INDEX: 23.85 KG/M2 | OXYGEN SATURATION: 93 % | SYSTOLIC BLOOD PRESSURE: 137 MMHG | HEART RATE: 72 BPM

## 2019-06-12 LAB
H PYLORI FROM TISSUE: NEGATIVE
KIT LOT NO., HCLOLOT: NORMAL
NEGATIVE CONTROL: NEGATIVE
POSITIVE CONTROL: POSITIVE

## 2019-06-12 PROCEDURE — 74011000250 HC RX REV CODE- 250

## 2019-06-12 PROCEDURE — 88305 TISSUE EXAM BY PATHOLOGIST: CPT

## 2019-06-12 PROCEDURE — 77030018712 HC DEV BLLN INFL BSC -B: Performed by: SPECIALIST

## 2019-06-12 PROCEDURE — 87077 CULTURE AEROBIC IDENTIFY: CPT | Performed by: SPECIALIST

## 2019-06-12 PROCEDURE — 88312 SPECIAL STAINS GROUP 1: CPT

## 2019-06-12 PROCEDURE — 77030021593 HC FCPS BIOP ENDOSC BSC -A: Performed by: SPECIALIST

## 2019-06-12 PROCEDURE — 77030027957 HC TBNG IRR ENDOGTR BUSS -B: Performed by: SPECIALIST

## 2019-06-12 PROCEDURE — 76040000008: Performed by: SPECIALIST

## 2019-06-12 PROCEDURE — 76060000033 HC ANESTHESIA 1 TO 1.5 HR: Performed by: SPECIALIST

## 2019-06-12 PROCEDURE — 74011250636 HC RX REV CODE- 250/636

## 2019-06-12 PROCEDURE — C1726 CATH, BAL DIL, NON-VASCULAR: HCPCS | Performed by: SPECIALIST

## 2019-06-12 RX ORDER — PROPOFOL 10 MG/ML
INJECTION, EMULSION INTRAVENOUS AS NEEDED
Status: DISCONTINUED | OUTPATIENT
Start: 2019-06-12 | End: 2019-06-12 | Stop reason: HOSPADM

## 2019-06-12 RX ORDER — FLUMAZENIL 0.1 MG/ML
0.2 INJECTION INTRAVENOUS
Status: DISCONTINUED | OUTPATIENT
Start: 2019-06-12 | End: 2019-06-12 | Stop reason: HOSPADM

## 2019-06-12 RX ORDER — DEXTROMETHORPHAN/PSEUDOEPHED 2.5-7.5/.8
1.2 DROPS ORAL
Status: DISCONTINUED | OUTPATIENT
Start: 2019-06-12 | End: 2019-06-12 | Stop reason: HOSPADM

## 2019-06-12 RX ORDER — SODIUM CHLORIDE 0.9 % (FLUSH) 0.9 %
5-40 SYRINGE (ML) INJECTION EVERY 8 HOURS
Status: DISCONTINUED | OUTPATIENT
Start: 2019-06-12 | End: 2019-06-12 | Stop reason: HOSPADM

## 2019-06-12 RX ORDER — LORAZEPAM 1 MG/1
TABLET ORAL
COMMUNITY
End: 2022-08-09

## 2019-06-12 RX ORDER — EPINEPHRINE 0.1 MG/ML
1 INJECTION INTRACARDIAC; INTRAVENOUS
Status: DISCONTINUED | OUTPATIENT
Start: 2019-06-12 | End: 2019-06-12 | Stop reason: HOSPADM

## 2019-06-12 RX ORDER — ATROPINE SULFATE 0.1 MG/ML
0.5 INJECTION INTRAVENOUS
Status: DISCONTINUED | OUTPATIENT
Start: 2019-06-12 | End: 2019-06-12 | Stop reason: HOSPADM

## 2019-06-12 RX ORDER — MIDAZOLAM HYDROCHLORIDE 1 MG/ML
.25-5 INJECTION, SOLUTION INTRAMUSCULAR; INTRAVENOUS
Status: DISCONTINUED | OUTPATIENT
Start: 2019-06-12 | End: 2019-06-12 | Stop reason: HOSPADM

## 2019-06-12 RX ORDER — NALOXONE HYDROCHLORIDE 0.4 MG/ML
0.4 INJECTION, SOLUTION INTRAMUSCULAR; INTRAVENOUS; SUBCUTANEOUS
Status: DISCONTINUED | OUTPATIENT
Start: 2019-06-12 | End: 2019-06-12 | Stop reason: HOSPADM

## 2019-06-12 RX ORDER — LIDOCAINE HYDROCHLORIDE 20 MG/ML
INJECTION, SOLUTION EPIDURAL; INFILTRATION; INTRACAUDAL; PERINEURAL AS NEEDED
Status: DISCONTINUED | OUTPATIENT
Start: 2019-06-12 | End: 2019-06-12 | Stop reason: HOSPADM

## 2019-06-12 RX ORDER — GLYCOPYRROLATE 0.2 MG/ML
INJECTION INTRAMUSCULAR; INTRAVENOUS AS NEEDED
Status: DISCONTINUED | OUTPATIENT
Start: 2019-06-12 | End: 2019-06-12 | Stop reason: HOSPADM

## 2019-06-12 RX ORDER — SODIUM CHLORIDE 0.9 % (FLUSH) 0.9 %
5-40 SYRINGE (ML) INJECTION AS NEEDED
Status: DISCONTINUED | OUTPATIENT
Start: 2019-06-12 | End: 2019-06-12 | Stop reason: HOSPADM

## 2019-06-12 RX ORDER — FENTANYL CITRATE 50 UG/ML
12.5-5 INJECTION, SOLUTION INTRAMUSCULAR; INTRAVENOUS
Status: DISCONTINUED | OUTPATIENT
Start: 2019-06-12 | End: 2019-06-12 | Stop reason: HOSPADM

## 2019-06-12 RX ORDER — SODIUM CHLORIDE 9 MG/ML
INJECTION, SOLUTION INTRAVENOUS
Status: DISCONTINUED | OUTPATIENT
Start: 2019-06-12 | End: 2019-06-12 | Stop reason: HOSPADM

## 2019-06-12 RX ORDER — SODIUM CHLORIDE 9 MG/ML
50 INJECTION, SOLUTION INTRAVENOUS CONTINUOUS
Status: DISCONTINUED | OUTPATIENT
Start: 2019-06-12 | End: 2019-06-12 | Stop reason: HOSPADM

## 2019-06-12 RX ADMIN — PROPOFOL 30 MG: 10 INJECTION, EMULSION INTRAVENOUS at 09:35

## 2019-06-12 RX ADMIN — PROPOFOL 30 MG: 10 INJECTION, EMULSION INTRAVENOUS at 09:24

## 2019-06-12 RX ADMIN — PROPOFOL 30 MG: 10 INJECTION, EMULSION INTRAVENOUS at 09:21

## 2019-06-12 RX ADMIN — PROPOFOL 30 MG: 10 INJECTION, EMULSION INTRAVENOUS at 09:26

## 2019-06-12 RX ADMIN — GLYCOPYRROLATE 0.1 MG: 0.2 INJECTION INTRAMUSCULAR; INTRAVENOUS at 09:20

## 2019-06-12 RX ADMIN — PROPOFOL 30 MG: 10 INJECTION, EMULSION INTRAVENOUS at 10:09

## 2019-06-12 RX ADMIN — PROPOFOL 30 MG: 10 INJECTION, EMULSION INTRAVENOUS at 09:23

## 2019-06-12 RX ADMIN — PROPOFOL 30 MG: 10 INJECTION, EMULSION INTRAVENOUS at 09:44

## 2019-06-12 RX ADMIN — PROPOFOL 30 MG: 10 INJECTION, EMULSION INTRAVENOUS at 09:38

## 2019-06-12 RX ADMIN — PROPOFOL 30 MG: 10 INJECTION, EMULSION INTRAVENOUS at 10:01

## 2019-06-12 RX ADMIN — PROPOFOL 20 MG: 10 INJECTION, EMULSION INTRAVENOUS at 09:22

## 2019-06-12 RX ADMIN — PROPOFOL 30 MG: 10 INJECTION, EMULSION INTRAVENOUS at 09:28

## 2019-06-12 RX ADMIN — LIDOCAINE HYDROCHLORIDE 20 MG: 20 INJECTION, SOLUTION EPIDURAL; INFILTRATION; INTRACAUDAL; PERINEURAL at 09:19

## 2019-06-12 RX ADMIN — PROPOFOL 50 MG: 10 INJECTION, EMULSION INTRAVENOUS at 09:20

## 2019-06-12 RX ADMIN — PROPOFOL 30 MG: 10 INJECTION, EMULSION INTRAVENOUS at 09:33

## 2019-06-12 RX ADMIN — PROPOFOL 30 MG: 10 INJECTION, EMULSION INTRAVENOUS at 09:30

## 2019-06-12 RX ADMIN — SODIUM CHLORIDE: 9 INJECTION, SOLUTION INTRAVENOUS at 09:15

## 2019-06-12 RX ADMIN — PROPOFOL 30 MG: 10 INJECTION, EMULSION INTRAVENOUS at 09:55

## 2019-06-12 RX ADMIN — PROPOFOL 20 MG: 10 INJECTION, EMULSION INTRAVENOUS at 09:29

## 2019-06-12 RX ADMIN — PROPOFOL 30 MG: 10 INJECTION, EMULSION INTRAVENOUS at 09:25

## 2019-06-12 RX ADMIN — PROPOFOL 40 MG: 10 INJECTION, EMULSION INTRAVENOUS at 09:50

## 2019-06-12 RX ADMIN — PROPOFOL 30 MG: 10 INJECTION, EMULSION INTRAVENOUS at 09:47

## 2019-06-12 NOTE — DISCHARGE INSTRUCTIONS
Chen Cloudluz marina  [id number]  1945    COLON/EGD  DISCHARGE INSTRUCTIONS  Discomfort: Warm salt water gargles for sore throat  Redness at IV site- apply warm compress to area; if redness or soreness persist- contact your physician  There may be a slight amount of blood passed from the rectum  Gaseous discomfort- walking, belching will help relieve any discomfort  You may not operate a vehicle for 12 hours  You may not engage in an occupation involving machinery or appliances for rest of today  You may not drink alcoholic beverages for at least 12 hours  Avoid making any critical decisions for at least 24 hour  DIET:   Regular diet. - however -  remember your colon is empty and a heavy meal will produce gas. Avoid these foods:  vegetables, fried / greasy foods, carbonated drinks for today. ACTIVITY:  You may resume your normal daily activities it is recommended that you spend the remainder of the day resting -  avoid any strenuous activity. CALL M.D. ANY SIGN OF:  Increasing pain, nausea, vomiting  Abdominal distension (swelling)  New increased bleeding (oral or rectal)  Fever (chills)  Pain in chest area  Bloody discharge from nose or mouth  Shortness of breath    You may not  take any Advil, Aspirin, Ibuprofen, Motrin, Aleve, Goodys, or any similar pain or arthritis products unless MD recommended, ONLY  Tylenol as needed for pain. Follow-up Instructions:   Call Dr. Dorota Walters office and make follow up appointment for 2-3 weeks  Results of procedure / biopsy in 10-14days   Office telephone for problems or questions [phone]  Ulcer and narrowing in esophagus. This was stretched;  will change acid medication script sent  Ulcer and narrowing of the small intestine-ileum.  This also was streched and needs further evaluation    Recommendation for next colonscopy in 10 years

## 2019-06-12 NOTE — ROUTINE PROCESS
Karlene Melendrez  1945  [id number]    Situation:  Verbal report received from:[facility]  Procedure: Procedure(s):  ESOPHAGOGASTRODUODENOSCOPY (EGD)  COLONOSCOPY  ESOPHAGOGASTRODUODENAL (EGD) BIOPSY  ESOPHAGEAL DILATION  COLON BIOPSY  Small Bowel DILATATION    Background:    Preoperative diagnosis: Abdominal pain  Anemia  Postoperative diagnosis: 1. GE Junction Erosions   2. Gastritis  3. Esophageal Stricture  3. Cricoopharengeal Achalasia  4. Esophageal Stenosis  5. Hiatal Hernia  6. Mild Sigmoid Diverticulosis  7. Ileal Erosion  8. Ileal Stenosis  9. Focal area of scarring in the Right Colon    :  Dr. Ray Mcdonald  Assistant(s): Endoscopy Technician-1: Elicia Dominguez  Endoscopy RN-1: Juan M Beth RN    Specimens:   ID Type Source Tests Collected by Time Destination   1 : GE Junction Erosion Biopsies Preservative   Jacque Mae MD 6/12/2019 0932 Pathology   2 : Lower Esophagus Biopsies Preservative   Jacque Mae MD 6/12/2019 0933 Pathology   3 : Upper Esophagus Biopsies Preservative   Jacque Mae MD 6/12/2019 0933 Pathology   4 : Ileal Erosion Biopsies Rule Out Crohn's Disease Preservative   Jacque Mae MD 6/12/2019 8359 Pathology   5 : Random Colon Biopsies Preservative   Jacque Mae MD 6/12/2019 1004 Pathology     H. Pylori  yes    Assessment:  Intra-procedure medications   Anesthesia gave intra-procedure sedation and medications, see anesthesia flow sheet yes    Intravenous fluids: NS@ KVO     Vital signs stable     Abdominal assessment: round and soft     Recommendation:  Discharge patient per MD order.   Family or Friend Alexis Luna  Permission to share finding with family or friend yes

## 2019-06-12 NOTE — ANESTHESIA POSTPROCEDURE EVALUATION
Post-Anesthesia Evaluation and Assessment    Patient: Chen Bustillos MRN: [id number]  SSN: [id number]    YOB: 1945  Age: 74 y.o. Sex: female      I have evaluated the patient and they are stable and ready for discharge from the PACU. Cardiovascular Function/Vital Signs  Visit Vitals  /75   Pulse 70   Temp 36.7 °C (98.1 °F)   Resp 18   Ht 5' 6\" (1.676 m)   Wt 67.3 kg (148 lb 6.4 oz)   SpO2 95%   BMI 23.95 kg/m²       Patient is status post MAC anesthesia for Procedure(s):  ESOPHAGOGASTRODUODENOSCOPY (EGD)  COLONOSCOPY  ESOPHAGOGASTRODUODENAL (EGD) BIOPSY  ESOPHAGEAL DILATION  COLON BIOPSY  Small Bowel DILATATION. Nausea/Vomiting: None    Postoperative hydration reviewed and adequate. Pain:  Pain Scale 1: Numeric (0 - 10) (06/12/19 0906)  Pain Intensity 1: 2 (06/12/19 0906)   Managed    Neurological Status: At baseline    Mental Status, Level of Consciousness: Alert and  oriented to person, place, and time    Pulmonary Status:   O2 Device: Nasal cannula (06/12/19 1013)   Adequate oxygenation and airway patent    Complications related to anesthesia: None    Post-anesthesia assessment completed. No concerns    Signed By: Kendy Stanton MD     June 12, 2019              Procedure(s):  ESOPHAGOGASTRODUODENOSCOPY (EGD)  COLONOSCOPY  ESOPHAGOGASTRODUODENAL (EGD) BIOPSY  ESOPHAGEAL DILATION  COLON BIOPSY  Small Bowel DILATATION. MAC    <BSHSIANPOST>    Vitals Value Taken Time   /74 6/12/2019 10:43 AM   Temp     Pulse 71 6/12/2019 10:43 AM   Resp 13 6/12/2019 10:43 AM   SpO2 94 % 6/12/2019 10:43 AM   Vitals shown include unvalidated device data.

## 2019-06-12 NOTE — H&P
Pre-endoscopy H and P for Colonoscopy    The patient was seen and examined. Date of last colonoscopy: , Polyps  No      The airway was assessed and documented. The problem list, past medical history, and medications were reviewed. Patient Active Problem List   Diagnosis Code    MDD (major depressive disorder), recurrent, [facility] F33.41    Falls W19. XXXA    Encephalopathy, unspecified G93.40    Orthostasis I95.1    Brachial neuritis or radiculitis NOS M54.12    Abnormal EKG R94.31     Social History     Socioeconomic History    Marital status:      Spouse name: Not on file    Number of children: Not on file    Years of education: Not on file    Highest education level: Not on file   Occupational History    Not on file   Social Needs    Financial resource strain: Not on file    Food insecurity:     Worry: Not on file     Inability: Not on file    Transportation needs:     Medical: Not on file     Non-medical: Not on file   Tobacco Use    Smoking status: Former Smoker     Last attempt to quit: 2008     Years since quittin.4    Smokeless tobacco: Never Used    Tobacco comment: quit smoking cigarettes   Substance and Sexual Activity    Alcohol use: No    Drug use: No    Sexual activity: Not on file   Lifestyle    Physical activity:     Days per week: Not on file     Minutes per session: Not on file    Stress: Not on file   Relationships    Social connections:     Talks on phone: Not on file     Gets together: Not on file     Attends Mormonism service: Not on file     Active member of club or organization: Not on file     Attends meetings of clubs or organizations: Not on file     Relationship status: Not on file    Intimate partner violence:     Fear of current or ex partner: Not on file     Emotionally abused: Not on file     Physically abused: Not on file     Forced sexual activity: Not on file   Other Topics Concern    Not on file   Social History Narrative    ** Merged History Encounter **          Past Medical History:   Diagnosis Date    Arthritis     DJD    Autoimmune disease ([facility]     psoriasis/others r/t autoimmune diseases - pt unsure of names    Cancer ([facility]     skin cancer - squamous and basal cell    Cerebellar ataxia (HCC)     no longer has    Chronic pain     DDD/fibromyalgia    Coagulation disorder ([facility]     free bleeder - no diagnosis    GERD (gastroesophageal reflux disease)     History of degenerative disc disease     Hypercholesterolemia     Lexiscan 3-13-18 WNL EF 80% [facility]    Hypertension     Liver disease     fatty liver    Orthostasis 5/13/2014    Other and unspecified symptoms and signs involving general sensations and perceptions     previous hx \"cerebellar ataxia\"    Other ill-defined conditions(127.52)     fibromyalgia    Psychiatric disorder     depression/anxiety    Thyroid disease      The patient has a family history of na    Prior to Admission Medications   Prescriptions Last Dose Informant Patient Reported? Taking? Cholecalciferol, Vitamin D3, (VITAMIN D3) 2,000 unit cap capsule 6/11/2019  Yes Yes   Sig: Take 2,000 Units by mouth daily. FOLIC ACID PO 4/07/0340 at Unknown time  Yes Yes   Sig: Take 800 mg by mouth daily. L gasseri/B bifidum/B longum ([facility] [address]) 6/5/2019 at Unknown time  Yes Yes   Sig: Take  by mouth. Takes one po once daily. LORazepam (ATIVAN) 1 mg tablet 6/11/2019 at Unknown time  Yes Yes   Sig: Take  by mouth nightly as needed for Anxiety. aspirin delayed-release 81 mg tablet 6/5/2019 at Unknown time  Yes Yes   Sig: Take 81 mg by mouth daily. atorvastatin (LIPITOR) 20 mg tablet 6/10/2019  Yes Yes   Sig: Take 20 mg by mouth nightly. biotin 1 mg tab 6/10/2019  Yes Yes   Sig: Take 1,000 mcg by mouth nightly. cyanocobalamin (VITAMIN B-12) 1,000 mcg tablet   No Yes   Sig: Take 1 Tab by mouth two (2) times a day.    hydrochlorothiazide (HYDRODIURIL) 25 mg tablet 2019 at Unknown time  No Yes   Si tab po daily   metoprolol tartrate (LOPRESSOR) 25 mg tablet 2019 at Unknown time  No Yes   Sig: Take 1 Tab by mouth daily. omeprazole (PRILOSEC) 40 mg capsule 6/10/2019  Yes Yes   Sig: Take 40 mg by mouth daily. potassium chloride (K-DUR, KLOR-CON) 20 mEq tablet 6/10/2019  Yes Yes   Sig: Take 20 mEq by mouth daily. sertraline (ZOLOFT) 100 mg tablet 6/10/2019  Yes Yes   Sig: Take 100 mg by mouth daily. traZODone (DESYREL) 50 mg tablet 6/10/2019  No Yes   Sig: Take 1 Tab by mouth nightly. Facility-Administered Medications: None         The review of systems is:  negative for shortness of breath or chest pain      The heart, lungs and mental status were satisfactory for the administration of MAC sedation and for the procedure. Mallampati score: See Anesthesia. I discussed with the patient the objectives, risks, consequences and alternatives to the procedure. Plan: Endoscopic procedure with MAC sedation.     Selena De Luna MD  2019  9:16 AM

## 2019-06-12 NOTE — PROGRESS NOTES

## 2019-06-12 NOTE — PROGRESS NOTES
CRE balloon dilatation of the Ileum   10 mm Balloon inflated to 3 ATMs and held for 30 seconds. 11 mm Balloon inflated to 5 ATMs and held for 30 seconds. 12 mm Balloon inflated to 8 ATMs and held for 60 seconds.

## 2019-06-13 NOTE — PROCEDURES
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 Usha Ivy  Assistant:  Endoscopy Technician-1: Lashae Cope IV  Endoscopy RN-1: aZhra Galloway RN  Surgical Assistant: None  Implants: None      Preoperative diagnosis: Abdominal pain  Anemia    Postoperative diagnosis: 1. GE Junction Erosions   2. Gastritis  3. Esophageal Stricture  3. Cricoopharengeal Achalasia  4. Esophageal Stenosis  5. Hiatal Hernia  6. Mild Sigmoid Diverticulosis  7. Ileal Erosion  8. Ileal Stenosis  9. Focal area of scarring in the Right Colon      Procedure in Detail:  Informed consent was obtained for the procedure, including sedation. Risks of perforation, hemorrhage, adverse drug reaction, and aspiration were discussed. The patient was placed in the left lateral decubitus position. Based on the pre-procedure assessment, including review of the patient's medical history, medications, allergies, and review of systems, she had been deemed to be an appropriate candidate for moderate sedation; she was therefore sedated with the medications listed above. The patient was monitored continuously with ECG tracing, pulse oximetry, blood pressure monitoring, and direct observations. An Olympus video endoscope was inserted into the mouth and advanced under direct vision to into the esophagus, then stomach and duodenum. A careful inspection was made as the gastroscope was withdrawn, including a retroflexed view of the proximal stomach; findings and interventions are described below.       Findings:   Esophagus:small GEJ erosion, questionable upper stenosis dilated with OTG [place] [address] [place] with mild tear  Stomach: mild antral gastritis - Bx for path and staining and Pyloritek  Duodenum/jejunum: normal    Therapies:  See above    Specimens: see above           EBL: None    Complications:   None; patient tolerated the procedure well. Recommendations:  -Acid suppression with a proton pump inhibitor change to different PPI - pantoprazole. , -Await pathology. , -Await RAFAELA test result and treat for Helicobacter pylori if positive. , -Follow up with me., -No NSAIDS    Ana Luisa Hanley MD                 Colonoscopy Procedure Note    Indications:   Diarrhea, Hx of Crohn's Dx'd years ago but no objective evidence found by me in the past to support the Dx  Referring Physician: Kurt Saleh MD   Anesthesia/Sedation:MAC  Endoscopist:  Dr. Mirtha Harris  Assistant:  Endoscopy Technician-1: Verónica Galeano IV  Endoscopy RN-1: Monika Smith RN  Surgical Assistant: None  Implants: None    Preoperative diagnosis: Abdominal pain  Anemia    Postoperative diagnosis: 1. GE Junction Erosions   2. Gastritis  3. Esophageal Stricture  3. Cricoopharengeal Achalasia  4. Esophageal Stenosis  5. Hiatal Hernia  6. Mild Sigmoid Diverticulosis  7. Ileal Erosion  8. Ileal Stenosis  9. Focal area of scarring in the Right Colon      Procedure in Detail:  Informed consent was obtained for the procedure, including sedation. Risks of perforation, hemorrhage, adverse drug reaction, and aspiration were discussed. The patient was placed in the left lateral decubitus position. Based on the pre-procedure assessment, including review of the patient's medical history, medications, allergies, and review of systems, she had been deemed to be an appropriate candidate for moderate sedation; she was therefore sedated with the medications listed above. The patient was monitored continuously with ECG tracing, pulse oximetry, blood pressure monitoring, and direct observations. A rectal examination was performed. The [id number] was inserted into the rectum and advanced under direct vision to the terminal ileum.   The quality of the colonic preparation was excellent. A careful inspection was made as the colonoscope was withdrawn, including a retroflexed view of the rectum; findings and interventions are described below. Findings: Random Bx r/o microscopic colitis  Rectum: normal  Sigmoid: mild diverticulosis  Descending Colon: normal  Transverse Colon: mucosal scarring with deformity of the folds in hepatic flexure and distal ascending colon  Ascending Colon: normal  Cecum: normal  Terminal Ileum: advanced 10 cm where mildly erosed stenosis was foun unable to travverse - Bx of stenosis and proximal area and dlated with TTS Balloon 10-11-12 mm still unable to traverse    Specimens:     see above    EBL: None    Complications: None; patient tolerated the procedure well. Recommendations:     - Await pathology. - For colon cancer screening in this average-risk patient, colonoscopy may be repeated in 10 years.      - CT enterography      - complete stool collection for fecal calprotectin    Signed By: Rosaura Savage MD                        June 13, 2019

## 2022-03-19 PROBLEM — R94.31 ABNORMAL EKG: Status: ACTIVE | Noted: 2018-03-25

## 2022-03-24 ENCOUNTER — APPOINTMENT (OUTPATIENT)
Dept: GENERAL RADIOLOGY | Age: 77
DRG: 291 | End: 2022-03-24
Attending: EMERGENCY MEDICINE
Payer: MEDICARE

## 2022-03-24 ENCOUNTER — OFFICE VISIT (OUTPATIENT)
Dept: CARDIOLOGY CLINIC | Age: 77
End: 2022-03-24
Payer: MEDICARE

## 2022-03-24 ENCOUNTER — APPOINTMENT (OUTPATIENT)
Dept: CT IMAGING | Age: 77
DRG: 291 | End: 2022-03-24
Attending: HOSPITALIST
Payer: MEDICARE

## 2022-03-24 ENCOUNTER — HOSPITAL ENCOUNTER (INPATIENT)
Age: 77
LOS: 4 days | Discharge: HOME | DRG: 291 | End: 2022-03-28
Attending: EMERGENCY MEDICINE | Admitting: HOSPITALIST
Payer: MEDICARE

## 2022-03-24 VITALS
DIASTOLIC BLOOD PRESSURE: 80 MMHG | HEIGHT: 66 IN | RESPIRATION RATE: 14 BRPM | SYSTOLIC BLOOD PRESSURE: 130 MMHG | WEIGHT: 153.8 LBS | HEART RATE: 114 BPM | OXYGEN SATURATION: 98 % | BODY MASS INDEX: 24.72 KG/M2

## 2022-03-24 DIAGNOSIS — R94.31 ABNORMAL EKG: Primary | ICD-10-CM

## 2022-03-24 DIAGNOSIS — R00.0 TACHYCARDIA INDUCED CARDIOMYOPATHY (HCC): ICD-10-CM

## 2022-03-24 DIAGNOSIS — I10 PRIMARY HYPERTENSION: ICD-10-CM

## 2022-03-24 DIAGNOSIS — I48.20 CHRONIC A-FIB (HCC): ICD-10-CM

## 2022-03-24 DIAGNOSIS — I43 TACHYCARDIA INDUCED CARDIOMYOPATHY (HCC): ICD-10-CM

## 2022-03-24 DIAGNOSIS — I50.9 ACUTE CONGESTIVE HEART FAILURE, UNSPECIFIED HEART FAILURE TYPE (HCC): ICD-10-CM

## 2022-03-24 DIAGNOSIS — R06.02 SHORTNESS OF BREATH: ICD-10-CM

## 2022-03-24 DIAGNOSIS — I95.1 ORTHOSTASIS: ICD-10-CM

## 2022-03-24 DIAGNOSIS — I48.91 ATRIAL FIBRILLATION WITH RVR (HCC): Primary | ICD-10-CM

## 2022-03-24 LAB
ALBUMIN SERPL-MCNC: 3.8 G/DL (ref 3.5–5)
ALBUMIN/GLOB SERPL: 1.1 {RATIO} (ref 1.1–2.2)
ALP SERPL-CCNC: 85 U/L (ref 45–117)
ALT SERPL-CCNC: 14 U/L (ref 12–78)
ANION GAP SERPL CALC-SCNC: 7 MMOL/L (ref 5–15)
AST SERPL-CCNC: 16 U/L (ref 15–37)
BASOPHILS # BLD: 0.1 K/UL (ref 0–0.1)
BASOPHILS NFR BLD: 1 % (ref 0–1)
BILIRUB SERPL-MCNC: 1.1 MG/DL (ref 0.2–1)
BNP SERPL-MCNC: ABNORMAL PG/ML
BUN SERPL-MCNC: 10 MG/DL (ref 6–20)
BUN/CREAT SERPL: 9 (ref 12–20)
CALCIUM SERPL-MCNC: 8.8 MG/DL (ref 8.5–10.1)
CHLORIDE SERPL-SCNC: 104 MMOL/L (ref 97–108)
CO2 SERPL-SCNC: 24 MMOL/L (ref 21–32)
COMMENT, HOLDF: NORMAL
CREAT SERPL-MCNC: 1.08 MG/DL (ref 0.55–1.02)
DIFFERENTIAL METHOD BLD: ABNORMAL
EOSINOPHIL # BLD: 0.1 K/UL (ref 0–0.4)
EOSINOPHIL NFR BLD: 1 % (ref 0–7)
ERYTHROCYTE [DISTWIDTH] IN BLOOD BY AUTOMATED COUNT: 15.7 % (ref 11.5–14.5)
GLOBULIN SER CALC-MCNC: 3.6 G/DL (ref 2–4)
GLUCOSE SERPL-MCNC: 131 MG/DL (ref 65–100)
HCT VFR BLD AUTO: 39.6 % (ref 35–47)
HGB BLD-MCNC: 12.3 G/DL (ref 11.5–16)
IMM GRANULOCYTES # BLD AUTO: 0 K/UL (ref 0–0.04)
IMM GRANULOCYTES NFR BLD AUTO: 0 % (ref 0–0.5)
LYMPHOCYTES # BLD: 1 K/UL (ref 0.8–3.5)
LYMPHOCYTES NFR BLD: 14 % (ref 12–49)
MAGNESIUM SERPL-MCNC: 2.1 MG/DL (ref 1.6–2.4)
MCH RBC QN AUTO: 26 PG (ref 26–34)
MCHC RBC AUTO-ENTMCNC: 31.1 G/DL (ref 30–36.5)
MCV RBC AUTO: 83.7 FL (ref 80–99)
MONOCYTES # BLD: 0.4 K/UL (ref 0–1)
MONOCYTES NFR BLD: 6 % (ref 5–13)
NEUTS SEG # BLD: 5.5 K/UL (ref 1.8–8)
NEUTS SEG NFR BLD: 78 % (ref 32–75)
NRBC # BLD: 0 K/UL (ref 0–0.01)
NRBC BLD-RTO: 0 PER 100 WBC
PLATELET # BLD AUTO: 346 K/UL (ref 150–400)
PMV BLD AUTO: 10.3 FL (ref 8.9–12.9)
POTASSIUM SERPL-SCNC: 3.5 MMOL/L (ref 3.5–5.1)
PROT SERPL-MCNC: 7.4 G/DL (ref 6.4–8.2)
RBC # BLD AUTO: 4.73 M/UL (ref 3.8–5.2)
SAMPLES BEING HELD,HOLD: NORMAL
SODIUM SERPL-SCNC: 135 MMOL/L (ref 136–145)
TROPONIN-HIGH SENSITIVITY: 24 NG/L (ref 0–51)
WBC # BLD AUTO: 7 K/UL (ref 3.6–11)

## 2022-03-24 PROCEDURE — 83880 ASSAY OF NATRIURETIC PEPTIDE: CPT

## 2022-03-24 PROCEDURE — 74176 CT ABD & PELVIS W/O CONTRAST: CPT

## 2022-03-24 PROCEDURE — 74011000250 HC RX REV CODE- 250: Performed by: HOSPITALIST

## 2022-03-24 PROCEDURE — 99204 OFFICE O/P NEW MOD 45 MIN: CPT | Performed by: SPECIALIST

## 2022-03-24 PROCEDURE — 74011000250 HC RX REV CODE- 250: Performed by: EMERGENCY MEDICINE

## 2022-03-24 PROCEDURE — 71045 X-RAY EXAM CHEST 1 VIEW: CPT

## 2022-03-24 PROCEDURE — 93005 ELECTROCARDIOGRAM TRACING: CPT

## 2022-03-24 PROCEDURE — G8536 NO DOC ELDER MAL SCRN: HCPCS | Performed by: SPECIALIST

## 2022-03-24 PROCEDURE — 65660000000 HC RM CCU STEPDOWN

## 2022-03-24 PROCEDURE — 74011250636 HC RX REV CODE- 250/636: Performed by: HOSPITALIST

## 2022-03-24 PROCEDURE — 96374 THER/PROPH/DIAG INJ IV PUSH: CPT

## 2022-03-24 PROCEDURE — G8427 DOCREV CUR MEDS BY ELIG CLIN: HCPCS | Performed by: SPECIALIST

## 2022-03-24 PROCEDURE — 85025 COMPLETE CBC W/AUTO DIFF WBC: CPT

## 2022-03-24 PROCEDURE — G8420 CALC BMI NORM PARAMETERS: HCPCS | Performed by: SPECIALIST

## 2022-03-24 PROCEDURE — 36415 COLL VENOUS BLD VENIPUNCTURE: CPT

## 2022-03-24 PROCEDURE — 83735 ASSAY OF MAGNESIUM: CPT

## 2022-03-24 PROCEDURE — 80053 COMPREHEN METABOLIC PANEL: CPT

## 2022-03-24 PROCEDURE — 1101F PT FALLS ASSESS-DOCD LE1/YR: CPT | Performed by: SPECIALIST

## 2022-03-24 PROCEDURE — 99285 EMERGENCY DEPT VISIT HI MDM: CPT

## 2022-03-24 PROCEDURE — G8400 PT W/DXA NO RESULTS DOC: HCPCS | Performed by: SPECIALIST

## 2022-03-24 PROCEDURE — 93000 ELECTROCARDIOGRAM COMPLETE: CPT | Performed by: SPECIALIST

## 2022-03-24 PROCEDURE — 74011250637 HC RX REV CODE- 250/637: Performed by: HOSPITALIST

## 2022-03-24 PROCEDURE — 1090F PRES/ABSN URINE INCON ASSESS: CPT | Performed by: SPECIALIST

## 2022-03-24 PROCEDURE — G9717 DOC PT DX DEP/BP F/U NT REQ: HCPCS | Performed by: SPECIALIST

## 2022-03-24 PROCEDURE — 84484 ASSAY OF TROPONIN QUANT: CPT

## 2022-03-24 PROCEDURE — 99223 1ST HOSP IP/OBS HIGH 75: CPT | Performed by: SPECIALIST

## 2022-03-24 RX ORDER — ONDANSETRON 4 MG/1
4 TABLET, ORALLY DISINTEGRATING ORAL
Status: DISCONTINUED | OUTPATIENT
Start: 2022-03-24 | End: 2022-03-28 | Stop reason: HOSPADM

## 2022-03-24 RX ORDER — METOPROLOL TARTRATE 50 MG/1
50 TABLET ORAL 2 TIMES DAILY
Status: DISCONTINUED | OUTPATIENT
Start: 2022-03-24 | End: 2022-03-27

## 2022-03-24 RX ORDER — METOPROLOL TARTRATE 5 MG/5ML
5 INJECTION INTRAVENOUS
Status: DISCONTINUED | OUTPATIENT
Start: 2022-03-24 | End: 2022-03-28 | Stop reason: HOSPADM

## 2022-03-24 RX ORDER — ACETAMINOPHEN 325 MG/1
650 TABLET ORAL
Status: DISCONTINUED | OUTPATIENT
Start: 2022-03-24 | End: 2022-03-28 | Stop reason: HOSPADM

## 2022-03-24 RX ORDER — ACETAMINOPHEN 650 MG/1
650 SUPPOSITORY RECTAL
Status: DISCONTINUED | OUTPATIENT
Start: 2022-03-24 | End: 2022-03-28 | Stop reason: HOSPADM

## 2022-03-24 RX ORDER — ONDANSETRON 2 MG/ML
4 INJECTION INTRAMUSCULAR; INTRAVENOUS
Status: DISCONTINUED | OUTPATIENT
Start: 2022-03-24 | End: 2022-03-28 | Stop reason: HOSPADM

## 2022-03-24 RX ORDER — DILTIAZEM HYDROCHLORIDE 5 MG/ML
10 INJECTION INTRAVENOUS
Status: COMPLETED | OUTPATIENT
Start: 2022-03-24 | End: 2022-03-24

## 2022-03-24 RX ORDER — FUROSEMIDE 10 MG/ML
40 INJECTION INTRAMUSCULAR; INTRAVENOUS DAILY
Status: DISCONTINUED | OUTPATIENT
Start: 2022-03-24 | End: 2022-03-26

## 2022-03-24 RX ORDER — SODIUM CHLORIDE 0.9 % (FLUSH) 0.9 %
5-40 SYRINGE (ML) INJECTION AS NEEDED
Status: DISCONTINUED | OUTPATIENT
Start: 2022-03-24 | End: 2022-03-28 | Stop reason: HOSPADM

## 2022-03-24 RX ORDER — FLUOXETINE HYDROCHLORIDE 40 MG/1
80 CAPSULE ORAL DAILY
COMMUNITY

## 2022-03-24 RX ORDER — ENOXAPARIN SODIUM 100 MG/ML
70 INJECTION SUBCUTANEOUS EVERY 12 HOURS
Status: DISCONTINUED | OUTPATIENT
Start: 2022-03-24 | End: 2022-03-26

## 2022-03-24 RX ORDER — SODIUM CHLORIDE 0.9 % (FLUSH) 0.9 %
5-40 SYRINGE (ML) INJECTION EVERY 8 HOURS
Status: DISCONTINUED | OUTPATIENT
Start: 2022-03-24 | End: 2022-03-28 | Stop reason: HOSPADM

## 2022-03-24 RX ORDER — POLYETHYLENE GLYCOL 3350 17 G/17G
17 POWDER, FOR SOLUTION ORAL DAILY PRN
Status: DISCONTINUED | OUTPATIENT
Start: 2022-03-24 | End: 2022-03-28 | Stop reason: HOSPADM

## 2022-03-24 RX ORDER — PANTOPRAZOLE SODIUM 40 MG/1
40 TABLET, DELAYED RELEASE ORAL
Status: DISCONTINUED | OUTPATIENT
Start: 2022-03-25 | End: 2022-03-28 | Stop reason: HOSPADM

## 2022-03-24 RX ORDER — FLUOXETINE HYDROCHLORIDE 20 MG/1
80 CAPSULE ORAL DAILY
Status: DISCONTINUED | OUTPATIENT
Start: 2022-03-25 | End: 2022-03-28 | Stop reason: HOSPADM

## 2022-03-24 RX ADMIN — SODIUM CHLORIDE, PRESERVATIVE FREE 10 ML: 5 INJECTION INTRAVENOUS at 21:30

## 2022-03-24 RX ADMIN — FUROSEMIDE 40 MG: 10 INJECTION, SOLUTION INTRAMUSCULAR; INTRAVENOUS at 15:02

## 2022-03-24 RX ADMIN — DILTIAZEM HYDROCHLORIDE 10 MG: 5 INJECTION INTRAVENOUS at 12:58

## 2022-03-24 RX ADMIN — ENOXAPARIN SODIUM 70 MG: 100 INJECTION SUBCUTANEOUS at 15:02

## 2022-03-24 RX ADMIN — METOPROLOL TARTRATE 50 MG: 25 TABLET, FILM COATED ORAL at 15:02

## 2022-03-24 RX ADMIN — METOPROLOL TARTRATE 50 MG: 25 TABLET, FILM COATED ORAL at 21:30

## 2022-03-24 RX ADMIN — SODIUM CHLORIDE, PRESERVATIVE FREE 10 ML: 5 INJECTION INTRAVENOUS at 15:07

## 2022-03-24 NOTE — PROGRESS NOTES
Bedside shift change report given to Js (oncoming nurse) by Cooper Hankins (offgoing nurse). Report included the following information SBAR, Intake/Output, MAR, Recent Results, Med Rec Status and Cardiac Rhythm Afib.

## 2022-03-24 NOTE — ED PROVIDER NOTES
History of hypertension, hyperlipidemia, atrial fibrillation, arthritis, psoriasis, skin cancer, degenerative disc disease, fibromyalgia, GERD, depression/anxiety, thyroid disease. She presents with complaints of shortness of breath that has been increasing over the last 2 months. No fever. She does report achy left-sided chest pain that began today. It radiates to her back. She states it is worse with inspiration. She states that she stopped all of her medicine several months ago to see how things would go without them. She has had 2 months of a dry cough. The dyspnea is worse with exertion. No lower extremity swelling.            Past Medical History:   Diagnosis Date    Arthritis     DJD    Autoimmune disease ([facility]     psoriasis/others r/t autoimmune diseases - pt unsure of names    Cancer ([facility]     skin cancer - squamous and basal cell    Cerebellar ataxia (HCC)     no longer has    Chronic pain     DDD/fibromyalgia    Coagulation disorder ([facility]     free bleeder - no diagnosis    GERD (gastroesophageal reflux disease)     History of degenerative disc disease     Hypercholesterolemia     Lexiscan 3-13-18 WNL EF 80% [facility]    Hypertension     Liver disease     fatty liver    Orthostasis 5/13/2014    Other and unspecified symptoms and signs involving general sensations and perceptions     previous hx \"cerebellar ataxia\"    Other ill-defined conditions(799.89)     fibromyalgia    Psychiatric disorder     depression/anxiety    Thyroid disease        Past Surgical History:   Procedure Laterality Date    COLONOSCOPY N/A 6/12/2019    COLONOSCOPY performed by Bridget Sanchez MD at [address] HX BUNIONECTOMY Bilateral     HX CATARACT REMOVAL Bilateral     HX CERVICAL FUSION      HX CHOLECYSTECTOMY  2005    HX GI      colonoscopy/EGD    HX HEENT Right 1972    surgery for torn retina     HX HYSTERECTOMY      HX ORTHOPAEDIC  1972    left knee surgery - for floating knee cap    HX ORTHOPAEDIC  1976    left knee cap removed    HX ORTHOPAEDIC Left     3 knee surgeries - 6139/3268/6222 - spurs removed    HX OTHER SURGICAL      numerous skin cancers removed    HX PARTIAL THYROIDECTOMY      benign tumor    HX RETINAL DETACHMENT REPAIR Right     sclera buckle         Family History:   Problem Relation Age of Onset    Heart Surgery Brother         mitral valve replacement    Other Brother         non-Hodgkins lymphoma    Stroke Brother     Hypertension Mother    Parker Elevated Lipids Mother    Parker Anesth Problems Mother         \"sleeps forever after\"/\"hungover\"    Heart Failure Father     Other Father         pacemaker    Heart Disease Father     Heart Attack Nephew 54       Social History     Socioeconomic History    Marital status:      Spouse name: Not on file    Number of children: Not on file    Years of education: Not on file    Highest education level: Not on file   Occupational History    Not on file   Tobacco Use    Smoking status: Former Smoker     Quit date: 2008     Years since quittin.1    Smokeless tobacco: Never Used    Tobacco comment: quit smoking cigarettes   Substance and Sexual Activity    Alcohol use: No    Drug use: No    Sexual activity: Not on file   Other Topics Concern    Not on file   Social History Narrative    ** Merged History Encounter **          Social Determinants of Health     Financial Resource Strain:     Difficulty of Paying Living Expenses: Not on file   Food Insecurity:     Worried About Running Out of Food in the Last Year: Not on file    Mitch of Food in the Last Year: Not on file   Transportation Needs:     Lack of Transportation (Medical): Not on file    Lack of Transportation (Non-Medical):  Not on file   Physical Activity:     Days of Exercise per Week: Not on file    Minutes of Exercise per Session: Not on file   Stress:     Feeling of Stress : Not on file   Social Connections:  Frequency of Communication with Friends and Family: Not on file    Frequency of Social Gatherings with Friends and Family: Not on file    Attends Spiritism Services: Not on file    Active Member of Clubs or Organizations: Not on file    Attends Club or Organization Meetings: Not on file    Marital Status: Not on file   Intimate Partner Violence:     Fear of Current or Ex-Partner: Not on file    Emotionally Abused: Not on file    Physically Abused: Not on file    Sexually Abused: Not on file   Housing Stability:     Unable to Pay for Housing in the Last Year: Not on file    Number of [place] in the Last Year: Not on file    Unstable Housing in the Last Year: Not on file         ALLERGIES: Latex, Latex, Adhesive, and Adhesive    Review of Systems   All other systems reviewed and are negative. Vitals:    03/24/22 1137   BP: (!) 165/97   Pulse: (!) 120   Resp: 24   Temp: 97.6 °F (36.4 °C)            Physical Exam  Vitals and nursing note reviewed. Constitutional:       Appearance: She is well-developed. HENT:      Head: Normocephalic and atraumatic. Eyes:      Conjunctiva/sclera: Conjunctivae normal.   Neck:      Trachea: No tracheal deviation. Cardiovascular:      Rate and Rhythm: Tachycardia present. Rhythm irregular. Heart sounds: Normal heart sounds. No murmur heard. No friction rub. No gallop. Pulmonary:      Effort: Pulmonary effort is normal.      Breath sounds: Normal breath sounds. Abdominal:      Palpations: Abdomen is soft. Tenderness: There is no abdominal tenderness. Musculoskeletal:         General: No deformity. Cervical back: Neck supple. Skin:     General: Skin is warm and dry. Neurological:      Mental Status: She is alert. Comments: oriented          MDM       Procedures    EKG: Atrial fibrillation; rate of 112; anteroseptal Q waves; nonspecific ST, T wave abnormalities.   Naz Ferguson MD  12:34 PM    Perfect Serve Consult for Admission  1:12 PM    ED Room Number: ER24/24  Patient Name and age:  Cheryle Plough 64 y.o.  female  Working Diagnosis: Atrial fibrillation with RVR  COVID-19 Suspicion:  no  Sepsis present:  no  Reassessment needed: no  Code Status:  Full Code  Readmission: no  Isolation Requirements:  no  Recommended Level of Care:  telemetry  Department:[facility] Adult ED - 21   Other: Referred from Dr. Kaylen Bhatia office for A. fib and dyspnea. Tachycardic in A. fib upon arrival.  Diltiazem bolus. She has been off her meds for 2 months. Total critical care time spent exclusive of procedures:  35 min. Priyanka Child MD  1:15 PM    Consult note: Dr. Mitali Shaffer (hospitalist) -will arrange admission.   Priyanka Child MD  1:16 PM

## 2022-03-24 NOTE — PROGRESS NOTES
[address] [facility]                                                            OFFICE NOTE        Pineda Uribe M.D.,BANDAR YARITZA RENDON   1945  [id number]    Date/Time:  3/24/475294:31 AM            SUBJECTIVE:  Significantly dyspneic with minimal activity and actually patient seems to be dyspneic just talking to me during examination. No chest pain reported. She does tell me that she noticed an increase in abdominal girth. No lower extremity edema reported and again no chest pain. Assessment/Plan    1. Shortness of breath: The patient is currently in atrial fibrillation with rapid ventricular response. Her electrocardio reveals atrial fibrillation heart rate 113 nonspecific ST-T wave abnormalities and poor R wave progression in the anteroseptal leads. Her dyspnea severe in my opinion she needs to proceed to the emergency room for further evaluation and treatment. She may have developed a cardiomyopathy and is resultant of atrial fibrillation with uncontrolled rate. She needs to have a echocardiogram likely intravenous diuretics basic testing which in my opinion need to be done within 24 hours. I discussed this with her son and the patient and they are agreeable to proceed to the emergency room today. She will require AV cristian blocking agent or anticoagulation eventually. Of course I will defer this to the hospital team.          HPI   68-year-old female with the past medical history remarkable for hyperlipidemia gastroesophageal reflux disease depression hypertension who presents today for cardiac evaluation of shortness of breath. To be noted that the shortness of breath is been ongoing for the last 5 to 6 months when the last week has become very severe.     Also be noted the patient has stopped taking all of her medication 5 to 6 months ago on her own accord. CARDIAC STUDIES                                    EKG Results     Procedure 720 Value Units Date/Time    AMB POC EKG ROUTINE W/ 12 LEADS, INTER & REP [[id number]] Resulted: 03/24/22 1013    Order Status: Completed Updated: 03/24/22 1014              IMAGING      MRI Results (most recent):  Results from Hospital Encounter encounter on 04/10/14    MRI LUMB SPINE W WO CONT    Narrative  **Final Report**      ICD Codes / Adm. Diagnosis: 781.3  E888.9 / Lack of coordination  Unspecified  fall  Examination:  MR HERB Garcia AND WO CON  - [id number] - Apr 12 2014  1:46PM  Accession No:  [id number]  Reason:  muliptle falls      REPORT:  INDICATION: Recent falls    COMPARISON: None    EXAM: Sagittal T1-weighted spin-echo, sagittal T2-weighted fast spin-echo,  sagittal inversion recovery, axial T1-weighted spin-echo, axial T2-weighted  fast spin-echo, and post IV-contrast enhanced sagittal and axial  fat-suppressed T1-weighted spin echo MR images of the lumbar spine are  obtained. A total of 5.5 cc Gadavist was administered for the study. FINDINGS: Conus position, morphology, signal and enhancement are normal.  There is normal vertebral body height without evidence for fracture or  osseous edema. Mild discogenic endplate signal changes are shown at the  T10-11, T11-12 and L5-S1 levels, greatest at L5-S1. There is anatomic  alignment. No paraspinal soft tissue mass is shown. There are is a small  right paralumbar hernia containing fat and possibly a portion of colon. T10-11 T11-12 show mild disc space narrowing and central disc bulging  without facet arthropathy, canal stenosis or foraminal stenosis. T12-L1 disc and facets are normal.    L1-2 disc and facets are normal.    L2-3 shows mild disc space narrowing and leftward lateralizing diffuse disc  bulging as well as mild bilateral facet arthropathy. There is no canal  stenosis. There is mild left foraminal narrowing.     L3-4 shows nearly normal disc height with minimal diffuse disc bulging and  mild to moderate bilateral facet arthropathy. There is a borderline  appearance of canal stenosis and mild left foraminal narrowing. L4-5 disc height is normal. There is minimal disc bulging and mild bilateral  facet arthropathy. There is no canal or foraminal stenosis. L5-S1 shows severe disc space narrowing with mild central disc bulging and  bilateral facet arthropathy. There is no canal stenosis. There is mild  bilateral foraminal narrowing. Impression  :  1. Mild degenerative changes. 2. No fracture demonstrated. 3. Right paralumbar hernia. Signing/Reading Doctor: Debra Adrian ([id number])  Approved: KIET Adrian ([id number])  Apr 12 2014  2:32PM      CT Results (most recent):  Results from Hospital Encounter encounter on 02/20/18    CT ABD PELV W CONT    Narrative  EXAM:  CT ABD PELV W CONT    INDICATION: Epigastric pain    COMPARISON: None    CONTRAST:  100 mL of Isovue-370. TECHNIQUE:  Following the uneventful intravenous administration of contrast, thin axial  images were obtained through the abdomen and pelvis. Coronal and sagittal  reconstructions were generated. Oral contrast was administered. CT dose  reduction was achieved through use of a standardized protocol tailored for this  examination and automatic exposure control for dose modulation. FINDINGS:  LUNG BASES: Clear. INCIDENTALLY IMAGED HEART AND MEDIASTINUM: Unremarkable. LIVER: Hepatic steatosis is noted. GALLBLADDER: Surgically absent. SPLEEN: No mass. Small splenule is noted. PANCREAS: No mass or ductal dilatation. ADRENALS: The left adrenal is enlarged and calcified. KIDNEYS: No mass, calculus, or hydronephrosis. . There is an 11 mm right renal  cyst.  STOMACH: There is a moderate hiatal hernia. SMALL BOWEL: No dilatation or wall thickening. There is a small duodenal  diverticulum. COLON: No dilatation or wall thickening. APPENDIX: Surgically absent.   PERITONEUM: No ascites or pneumoperitoneum. RETROPERITONEUM: The abdominal aorta is atherosclerotic without evidence of  aneurysm. REPRODUCTIVE ORGANS: The uterus is surgically absent. There is no pelvic mass or  lymphadenopathy. URINARY BLADDER: No mass or calculus. BONES: Degenerative changes are seen in the lumbar spine. ADDITIONAL COMMENTS: N/A    Impression  IMPRESSION:  Hepatic steatosis. Calcified left adrenal gland is compatible with prior  hemorrhage. Status post cholecystectomy, hysterectomy, and appendectomy. Moderate hiatal hernia. No acute abnormality. XR Results (most recent):  Results from Hospital Encounter encounter on 06/06/18    XR UPPER GI W KUB AIR CONT    Narrative  EXAM:  XR UPPER GI W KUB AIR CONT    INDICATION:  Change in bowel habits. Diarrhea. COMPARISON: CT abdomen/pelvis 2/20/2018. FINDINGS:  The preliminary radiograph of the abdomen demonstrates a nonobstructive gas  pattern. A calcified left adrenal gland and right upper abdomen surgical clips  are again noted. The bones are stable. A biphasic examination was performed. The patient ingested effervescent granules  followed by barium without difficulty. The esophagus is normal in caliber and contour with no mass, constricting lesion  or mucosal abnormality. There are tertiary contractions but primary esophageal  peristalsis is unremarkable. There is a moderate hiatal hernia with trace  gastroesophageal reflux. The stomach is otherwise normal in size, shape and position with no mass, ulcer  or other abnormality. The duodenal bulb, sweep and proximal small bowel are normal.    Impression  IMPRESSION:  Moderate hiatal hernia with trace gastroesophageal reflux. Esophageal tertiary contractions are noted. No other acute abnormality. FLUOROSCOPY TIME:  0.8 minutes. FLUOROSCOPY DOSE (AIR KERMA):  19.35 mGy.           Past Medical History:   Diagnosis Date    Arthritis     DJD    Autoimmune disease ([facility] [address] psoriasis/others r/t autoimmune diseases - pt unsure of names    Cancer [facility])     skin cancer - squamous and basal cell    Cerebellar ataxia (HCC)     no longer has    Chronic pain     DDD/fibromyalgia    Coagulation disorder ([facility] [address]     free bleeder - no diagnosis    GERD (gastroesophageal reflux disease)     History of degenerative disc disease     Hypercholesterolemia     Lexiscan 3-13-18 WNL EF 80% [facility]    Hypertension     Liver disease     fatty liver    Orthostasis 2014    Other and unspecified symptoms and signs involving general sensations and perceptions     previous hx \"cerebellar ataxia\"    Other ill-defined conditions(799.89)     fibromyalgia    Psychiatric disorder     depression/anxiety    Thyroid disease      Past Surgical History:   Procedure Laterality Date    COLONOSCOPY N/A 2019    COLONOSCOPY performed by Seveirno Randle MD at [facility]     [address] HX BUNIONECTOMY Bilateral     HX CATARACT REMOVAL Bilateral     HX CERVICAL FUSION      HX CHOLECYSTECTOMY  2005    HX GI      colonoscopy/EGD    HX HEENT Right 1972    surgery for torn retina     HX HYSTERECTOMY      HX ORTHOPAEDIC  1972    left knee surgery - for floating knee cap    HX ORTHOPAEDIC  1976    left knee cap removed    HX ORTHOPAEDIC Left     3 knee surgeries - 4537/4027/2242 - spurs removed    HX OTHER SURGICAL      numerous skin cancers removed    HX PARTIAL THYROIDECTOMY      benign tumor    HX RETINAL DETACHMENT REPAIR Right     sclera buckle     Social History     Tobacco Use    Smoking status: Former Smoker     Quit date: 2008     Years since quittin.1    Smokeless tobacco: Never Used    Tobacco comment: quit smoking cigarettes   Substance Use Topics    Alcohol use: No    Drug use: No     Family History   Problem Relation Age of Onset    Heart Surgery Brother         mitral valve replacement    Other Brother         non-Hodgkins lymphoma    Stroke Brother     Hypertension Mother     Elevated Lipids Mother    Raphael Shiley Problems Mother         \"sleeps forever after\"/\"hungover\"    Heart Failure Father     Other Father         pacemaker    Heart Disease Father     Heart Attack Nephew 55     Allergies   Allergen Reactions    Latex Other (comments)     Excoriation of skin    Latex Rash    Adhesive Other (comments)     Excoriation of skin    Adhesive Rash     Excoriates the skin. Visit Vitals  /80 (BP 1 Location: Right arm, BP Patient Position: Sitting, BP Cuff Size: Adult)   Pulse (!) 114   Resp 14   Ht 5' 6\" (1.676 m)   Wt 153 lb 12.8 oz (69.8 kg)   SpO2 98%   BMI 24.82 kg/m²         Last 3 Recorded Weights in this Encounter    03/24/22 1006   Weight: 153 lb 12.8 oz (69.8 kg)            Review of Systems:   Pertinent items are noted in the History of Present Illness. Neck: no JVD  Heart: irregularly irregular rhythm  Lungs: diminished breath sounds R base, L base  Abdomen: abnormal findings:  distended  Extremities: no edema      Current Outpatient Medications on File Prior to Visit   Medication Sig Dispense Refill    FLUoxetine (PROzac) 40 mg capsule Take 80 mg by mouth daily.  LORazepam (ATIVAN) 1 mg tablet Take  by mouth nightly as needed for Anxiety. (Patient not taking: Reported on 3/24/2022)      atorvastatin (LIPITOR) 20 mg tablet Take 20 mg by mouth nightly. (Patient not taking: Reported on 3/24/2022)      omeprazole (PRILOSEC) 40 mg capsule Take 40 mg by mouth daily. (Patient not taking: Reported on 3/24/2022)      L gasseri/B bifidum/B longum (Reissued PO) Take  by mouth. Takes one po once daily. (Patient not taking: Reported on 3/24/2022)      sertraline (ZOLOFT) 100 mg tablet Take 100 mg by mouth daily. (Patient not taking: Reported on 3/24/2022)      biotin 1 mg tab Take 1,000 mcg by mouth nightly.  (Patient not taking: Reported on 3/24/2022)      traZODone (DESYREL) 50 mg tablet Take 1 Tab by mouth nightly. (Patient not taking: Reported on 3/24/2022) 90 Tab 1    metoprolol tartrate (LOPRESSOR) 25 mg tablet Take 1 Tab by mouth daily. (Patient not taking: Reported on 3/24/2022) 90 Tab 1    aspirin delayed-release 81 mg tablet Take 81 mg by mouth daily. (Patient not taking: Reported on 4/09/6234)      FOLIC ACID PO Take 500 mg by mouth daily. (Patient not taking: Reported on 3/24/2022)      Cholecalciferol, Vitamin D3, (VITAMIN D3) 2,000 unit cap capsule Take 2,000 Units by mouth daily. (Patient not taking: Reported on 3/24/2022)  0    hydrochlorothiazide (HYDRODIURIL) 25 mg tablet 1 tab po daily (Patient not taking: Reported on 3/24/2022) 90 Tab 0    cyanocobalamin (VITAMIN B-12) 1,000 mcg tablet Take 1 Tab by mouth two (2) times a day. (Patient not taking: Reported on 3/24/2022) 60 Each 0    potassium chloride (K-DUR, KLOR-CON) 20 mEq tablet Take 20 mEq by mouth daily. (Patient not taking: Reported on 3/24/2022)       No current facility-administered medications on file prior to visit. Aguilar Ritchie had no medications administered during this visit. Current Outpatient Medications   Medication Sig    FLUoxetine (PROzac) 40 mg capsule Take 80 mg by mouth daily.  LORazepam (ATIVAN) 1 mg tablet Take  by mouth nightly as needed for Anxiety. (Patient not taking: Reported on 3/24/2022)    atorvastatin (LIPITOR) 20 mg tablet Take 20 mg by mouth nightly. (Patient not taking: Reported on 3/24/2022)    omeprazole (PRILOSEC) 40 mg capsule Take 40 mg by mouth daily. (Patient not taking: Reported on 3/24/2022)    L gasseri/B bifidum/B longum ([place]ump[place] PO) Take  by mouth. Takes one po once daily. (Patient not taking: Reported on 3/24/2022)    sertraline (ZOLOFT) 100 mg tablet Take 100 mg by mouth daily. (Patient not taking: Reported on 3/24/2022)    biotin 1 mg tab Take 1,000 mcg by mouth nightly.  (Patient not taking: Reported on 3/24/2022)    traZODone (DESYREL) 50 mg tablet Take 1 Tab by mouth nightly. (Patient not taking: Reported on 3/24/2022)    metoprolol tartrate (LOPRESSOR) 25 mg tablet Take 1 Tab by mouth daily. (Patient not taking: Reported on 3/24/2022)    aspirin delayed-release 81 mg tablet Take 81 mg by mouth daily. (Patient not taking: Reported on 4/59/4113)    FOLIC ACID PO Take 257 mg by mouth daily. (Patient not taking: Reported on 3/24/2022)    Cholecalciferol, Vitamin D3, (VITAMIN D3) 2,000 unit cap capsule Take 2,000 Units by mouth daily. (Patient not taking: Reported on 3/24/2022)    hydrochlorothiazide (HYDRODIURIL) 25 mg tablet 1 tab po daily (Patient not taking: Reported on 3/24/2022)    cyanocobalamin (VITAMIN B-12) 1,000 mcg tablet Take 1 Tab by mouth two (2) times a day. (Patient not taking: Reported on 3/24/2022)    potassium chloride (K-DUR, KLOR-CON) 20 mEq tablet Take 20 mEq by mouth daily. (Patient not taking: Reported on 3/24/2022)     No current facility-administered medications for this visit. Lab Results   Component Value Date/Time    Cholesterol, total 182 02/18/2016 04:05 PM    HDL Cholesterol 78 02/18/2016 04:05 PM    LDL, calculated 83 02/18/2016 04:05 PM    VLDL, calculated 21 02/18/2016 04:05 PM    Triglyceride 106 02/18/2016 04:05 PM    CHOL/HDL Ratio 2.4 05/13/2014 04:15 AM       Lab Results   Component Value Date/Time    Sodium 141 02/18/2016 04:05 PM    Potassium 5.1 02/18/2016 04:05 PM    Chloride 100 02/18/2016 04:05 PM    CO2 27 02/18/2016 04:05 PM    Anion gap 7 06/28/2015 07:30 PM    Glucose 102 (H) 02/18/2016 04:05 PM    BUN 12 02/18/2016 04:05 PM    Creatinine 0.88 02/18/2016 04:05 PM    BUN/Creatinine ratio 14 02/18/2016 04:05 PM    GFR est AA 76 02/18/2016 04:05 PM    GFR est non-AA 66 02/18/2016 04:05 PM    Calcium 9.1 02/18/2016 04:05 PM       Lab Results   Component Value Date/Time    ALT (SGPT) 11 02/18/2016 04:05 PM    Alk.  phosphatase 65 02/18/2016 04:05 PM    Bilirubin, total 0.4 02/18/2016 04:05 PM       Lab Results   Component Value Date/Time    WBC 6.7 03/04/2016 02:30 PM    HGB 12.1 03/04/2016 02:30 PM    HCT 34.8 03/04/2016 02:30 PM    PLATELET 751 03/75/5331 02:30 PM    MCV 81 03/04/2016 02:30 PM       Lab Results   Component Value Date/Time    TSH 1.73 04/10/2014 11:13 AM         Lab Results   Component Value Date/Time    Cholesterol, total 182 02/18/2016 04:05 PM    Cholesterol, total 168 05/13/2014 04:15 AM    Cholesterol, total 146 04/04/2014 04:48 AM    HDL Cholesterol 78 02/18/2016 04:05 PM    HDL Cholesterol 71 05/13/2014 04:15 AM    HDL Cholesterol 58 04/04/2014 04:48 AM    LDL, calculated 83 02/18/2016 04:05 PM    LDL, calculated 76.8 05/13/2014 04:15 AM    LDL, calculated 71.2 04/04/2014 04:48 AM    Triglyceride 106 02/18/2016 04:05 PM    Triglyceride 101 05/13/2014 04:15 AM    Triglyceride 84 04/04/2014 04:48 AM    CHOL/HDL Ratio 2.4 05/13/2014 04:15 AM    CHOL/HDL Ratio 2.5 04/04/2014 04:48 AM                Please note that this dictation was completed with [email or web address], the [employer] voice recognition software. Quite often unanticipated grammatical, syntax, homophones, and other interpretative errors are inadvertently transcribed by the computer software. Please disregard these errors. Please excuse any errors that have escaped final proofreading.

## 2022-03-24 NOTE — CONSULTS
3/24/2022    Cardiology Initial CareNote   [facility] M.D. , F.A.C.C.   --------PCP:-Triston Correa MD   -----Subjective:   Cisco Olszewski is a 64 y.o. female  Initial Care Note requested for evaluation and management of AFwith RVR& likely CHF  Off meds for months  Has 2 months of tachy and WOLFF  Has some seconds lasting chest pressure last 2 weeks  Today WOLFF worse, came to office, was in AFib with RVR and AHRF  Came to ER, HR 130s, down to 95 with dilt 10, feels better  Son at bedside      Prior cardiac history   HTN  XOL  GERD      Discussion/Plan/Impression:  1. Afib with RVR  Better with one dose of IV dilt but still high rate  Start Po beta blocker and OAC  2. CP  Get nuclear stress test in am if hr ok  3. Likely CMY, had CHF, lungs now clearing, mild edema  Lab Results   Component Value Date/Time    BNP 41 04/03/2014 11:35 AM    NT pro-BNP 12,153 (H) 03/24/2022 11:57 AM    4. XOL  Restart statin  Suspect CAD               Lab Results   Component Value Date/Time    Creatinine (POC) 0.9 02/20/2018 03:12 PM    Creatinine 1.08 (H) 03/24/2022 11:57 AM      Results for orders placed or performed during the hospital encounter of 03/24/22   EKG, 12 LEAD, INITIAL   Result Value Ref Range    Ventricular Rate 112 BPM    QRS Duration 90 ms    Q-T Interval 382 ms    QTC Calculation (Bezet) 521 ms    Calculated R Axis -13 degrees    Calculated T Axis 82 degrees    Diagnosis       Atrial fibrillation with rapid ventricular response  Anteroseptal infarct , age undetermined  Abnormal ECG  When compared with ECG of 28-JUN-2015 19:21,  Atrial fibrillation has replaced Sinus rhythm  Vent.  rate has increased BY  42 BPM  Anteroseptal infarct is now present  ST no longer depressed in Anterior leads  Nonspecific T wave abnormality no longer evident in Inferior leads  Nonspecific T wave abnormality now evident in Anterior leads         Cardiac Studies/Hx:  No specialty comments available. Medical history notable for  has a past medical history of Arthritis, Autoimmune disease ([address] Cancer ([address] Cerebellar ataxia ([address] Chronic pain, Coagulation disorder ([facility] GERD (gastroesophageal reflux disease), History of degenerative disc disease, Hypercholesterolemia, Hypertension, Liver disease, Orthostasis (5/13/2014), Other and unspecified symptoms and signs involving general sensations and perceptions, Other ill-defined conditions(799.89), Psychiatric disorder, and Thyroid disease. She has no past medical history of Adverse effect of anesthesia or Unspecified sleep apnea. Social history notable for  reports that she quit smoking about 14 years ago. She has never used smokeless tobacco. She reports that she does not drink alcohol and does not use drugs. Family history notable for family history includes Anesth Problems in her mother; Elevated Lipids in her mother; Heart Attack (age of onset: 54) in her nephew; Heart Disease in her father; Heart Failure in her father; Heart Surgery in her brother; Hypertension in her mother; Other in her brother and father; Stroke in her brother.         Past Medical History:   Diagnosis Date    Arthritis     DJD    Autoimmune disease ([facility]     psoriasis/others r/t autoimmune diseases - pt unsure of names    Cancer ([address]     skin cancer - squamous and basal cell    Cerebellar ataxia (HCC)     no longer has    Chronic pain     DDD/fibromyalgia    Coagulation disorder ([address]     free bleeder - no diagnosis    GERD (gastroesophageal reflux disease)     History of degenerative disc disease     Hypercholesterolemia     Lexiscan 3-13-18 WNL EF 80% [facility]    Hypertension     Liver disease     fatty liver    Orthostasis 5/13/2014    Other and unspecified symptoms and signs involving general sensations and perceptions     previous hx \"cerebellar ataxia\"    Other ill-defined conditions(799.89)     fibromyalgia    Psychiatric disorder depression/anxiety    Thyroid disease         ROS-pertinents  negative except as above  The pertinent portions of the medical history,physician and nursing notes, meds,vitals , labs and Ins/Outs,are reviewed in the electronic record  Pt reports   SOB and the remainder of a complete multisystem 11 ROS  Are reviewed and negative    Social History     Tobacco Use    Smoking status: Former Smoker     Quit date: 2008     Years since quittin.1    Smokeless tobacco: Never Used    Tobacco comment: quit smoking cigarettes   Substance Use Topics    Alcohol use: No    Drug use: No      Family History   Problem Relation Age of Onset    Heart Surgery Brother         mitral valve replacement    Other Brother         non-Hodgkins lymphoma    Stroke Brother     Hypertension Mother     Elevated Lipids Mother     Anesth Problems Mother         \"sleeps forever after\"/\"hungover\"    Heart Failure Father     Other Father         pacemaker    Heart Disease Father     Heart Attack Nephew 55      Prior to Admission Medications   Prescriptions Last Dose Informant Patient Reported? Taking? Cholecalciferol, Vitamin D3, (VITAMIN D3) 2,000 unit cap capsule   Yes No   Sig: Take 2,000 Units by mouth daily. Patient not taking: Reported on 3/24/2022   FLUoxetine (PROzac) 40 mg capsule   Yes No   Sig: Take 80 mg by mouth daily. FOLIC ACID PO   Yes No   Sig: Take 800 mg by mouth daily. Patient not taking: Reported on 3/24/2022   L gasseri/B bifidum/B longum ([employer] PO)   Yes No   Sig: Take  by mouth. Takes one po once daily. Patient not taking: Reported on 3/24/2022   LORazepam (ATIVAN) 1 mg tablet   Yes No   Sig: Take  by mouth nightly as needed for Anxiety. Patient not taking: Reported on 3/24/2022   aspirin delayed-release 81 mg tablet   Yes No   Sig: Take 81 mg by mouth daily.    Patient not taking: Reported on 3/24/2022   atorvastatin (LIPITOR) 20 mg tablet   Yes No   Sig: Take 20 mg by mouth nightly. Patient not taking: Reported on 3/24/2022   biotin 1 mg tab   Yes No   Sig: Take 1,000 mcg by mouth nightly. Patient not taking: Reported on 3/24/2022   cyanocobalamin (VITAMIN B-12) 1,000 mcg tablet   No No   Sig: Take 1 Tab by mouth two (2) times a day. Patient not taking: Reported on 3/24/2022   hydrochlorothiazide (HYDRODIURIL) 25 mg tablet   No No   Si tab po daily   Patient not taking: Reported on 3/24/2022   metoprolol tartrate (LOPRESSOR) 25 mg tablet   No No   Sig: Take 1 Tab by mouth daily. Patient not taking: Reported on 3/24/2022   omeprazole (PRILOSEC) 40 mg capsule   Yes No   Sig: Take 40 mg by mouth daily. Patient not taking: Reported on 3/24/2022   potassium chloride (K-DUR, KLOR-CON) 20 mEq tablet   Yes No   Sig: Take 20 mEq by mouth daily. Patient not taking: Reported on 3/24/2022   sertraline (ZOLOFT) 100 mg tablet   Yes No   Sig: Take 100 mg by mouth daily. Patient not taking: Reported on 3/24/2022   traZODone (DESYREL) 50 mg tablet   No No   Sig: Take 1 Tab by mouth nightly. Patient not taking: Reported on 3/24/2022      Facility-Administered Medications: None     Allergies   Allergen Reactions    Latex Other (comments)     Excoriation of skin    Latex Rash    Adhesive Other (comments)     Excoriation of skin    Adhesive Rash     Excoriates the skin.        Objective:    Physical Exam:   Patient Vitals for the past 12 hrs:   Temp Pulse Resp BP SpO2   22 1549 97.7 °F (36.5 °C) 100 19 (!) 145/101 95 %   22 1502 -- (!) 112 -- (!) 153/107 --   22 1305 -- 99 22 -- 92 %   22 1304 97.8 °F (36.6 °C) (!) 103 21 116/71 (!) 86 %   22 1245 -- (!) 107 22 (!) 150/105 --   22 1230 -- (!) 116 23 (!) 125/98 98 %   22 1137 97.6 °F (36.4 °C) (!) 120 24 (!) 165/97 --      General:  alert, cooperative, no distress, appears stated age       [de-identified], Neck:  NC,AT- no JVD   Chest Wall: inspection normal - no chest wall deformities or tenderness, respiratory effort normal   Lung:   clear to auscultation bilaterally   Heart:    tachycardia IRIR, normal S1, S2, no murmurs, rubs, clicks or gallops   Abdomen: nondistended   Extremities: extremities normal, atraumatic, no cyanosis or edema     Last 24hr Input/Output:  No intake or output data in the 24 hours ending 03/24/22 1618     Data Review:   Recent Results (from the past 24 hour(s))   EKG, 12 LEAD, INITIAL    Collection Time: 03/24/22 11:47 AM   Result Value Ref Range    Ventricular Rate 112 BPM    QRS Duration 90 ms    Q-T Interval 382 ms    QTC Calculation (Bezet) 521 ms    Calculated R Axis -13 degrees    Calculated T Axis 82 degrees    Diagnosis       Atrial fibrillation with rapid ventricular response  Anteroseptal infarct , age undetermined  Abnormal ECG  When compared with ECG of 28-JUN-2015 19:21,  Atrial fibrillation has replaced Sinus rhythm  Vent. rate has increased BY  42 BPM  Anteroseptal infarct is now present  ST no longer depressed in Anterior leads  Nonspecific T wave abnormality no longer evident in Inferior leads  Nonspecific T wave abnormality now evident in Anterior leads     CBC WITH AUTOMATED DIFF    Collection Time: 03/24/22 11:57 AM   Result Value Ref Range    WBC 7.0 3.6 - 11.0 K/uL    RBC 4.73 3.80 - 5.20 M/uL    HGB 12.3 11.5 - 16.0 g/dL    HCT 39.6 35.0 - 47.0 %    MCV 83.7 80.0 - 99.0 FL    MCH 26.0 26.0 - 34.0 PG    MCHC 31.1 30.0 - 36.5 g/dL    RDW 15.7 (H) 11.5 - 14.5 %    PLATELET 577 450 - 704 K/uL    MPV 10.3 8.9 - 12.9 FL    NRBC 0.0 0  WBC    ABSOLUTE NRBC 0.00 0.00 - 0.01 K/uL    NEUTROPHILS 78 (H) 32 - 75 %    LYMPHOCYTES 14 12 - 49 %    MONOCYTES 6 5 - 13 %    EOSINOPHILS 1 0 - 7 %    BASOPHILS 1 0 - 1 %    IMMATURE GRANULOCYTES 0 0.0 - 0.5 %    ABS. NEUTROPHILS 5.5 1.8 - 8.0 K/UL    ABS. LYMPHOCYTES 1.0 0.8 - 3.5 K/UL    ABS. MONOCYTES 0.4 0.0 - 1.0 K/UL    ABS. EOSINOPHILS 0.1 0.0 - 0.4 K/UL    ABS. BASOPHILS 0.1 0.0 - 0.1 K/UL    ABS. IMM.  GRANS. 0.0 0.00 - 0.04 K/UL    DF AUTOMATED     METABOLIC PANEL, COMPREHENSIVE    Collection Time: 03/24/22 11:57 AM   Result Value Ref Range    Sodium 135 (L) 136 - 145 mmol/L    Potassium 3.5 3.5 - 5.1 mmol/L    Chloride 104 97 - 108 mmol/L    CO2 24 21 - 32 mmol/L    Anion gap 7 5 - 15 mmol/L    Glucose 131 (H) 65 - 100 mg/dL    BUN 10 6 - 20 MG/DL    Creatinine 1.08 (H) 0.55 - 1.02 MG/DL    BUN/Creatinine ratio 9 (L) 12 - 20      GFR est AA 59 (L) >60 ml/min/1.73m2    GFR est non-AA 49 (L) >60 ml/min/1.73m2    Calcium 8.8 8.5 - 10.1 MG/DL    Bilirubin, total 1.1 (H) 0.2 - 1.0 MG/DL    ALT (SGPT) 14 12 - 78 U/L    AST (SGOT) 16 15 - 37 U/L    Alk. phosphatase 85 45 - 117 U/L    Protein, total 7.4 6.4 - 8.2 g/dL    Albumin 3.8 3.5 - 5.0 g/dL    Globulin 3.6 2.0 - 4.0 g/dL    A-G Ratio 1.1 1.1 - 2.2     MAGNESIUM    Collection Time: 03/24/22 11:57 AM   Result Value Ref Range    Magnesium 2.1 1.6 - 2.4 mg/dL   TROPONIN-HIGH SENSITIVITY    Collection Time: 03/24/22 11:57 AM   Result Value Ref Range    Troponin-High Sensitivity 24 0 - 51 ng/L   SAMPLES BEING HELD    Collection Time: 03/24/22 11:57 AM   Result Value Ref Range    SAMPLES BEING HELD 1 RED     COMMENT        Add-on orders for these samples will be processed based on acceptable specimen integrity and analyte stability, which may vary by analyte.    NT-PRO BNP    Collection Time: 03/24/22 11:57 AM   Result Value Ref Range    NT pro-BNP 12,153 (H) <450 PG/ML      Lab Results   Component Value Date/Time    Cholesterol, total 182 02/18/2016 04:05 PM    Cholesterol, total 168 05/13/2014 04:15 AM    Cholesterol, total 146 04/04/2014 04:48 AM    HDL Cholesterol 78 02/18/2016 04:05 PM    HDL Cholesterol 71 05/13/2014 04:15 AM    HDL Cholesterol 58 04/04/2014 04:48 AM    LDL, calculated 83 02/18/2016 04:05 PM    LDL, calculated 76.8 05/13/2014 04:15 AM    LDL, calculated 71.2 04/04/2014 04:48 AM    Triglyceride 106 02/18/2016 04:05 PM    Triglyceride 101 05/13/2014 04:15 AM Triglyceride 84 04/04/2014 04:48 AM    CHOL/HDL Ratio 2.4 05/13/2014 04:15 AM    CHOL/HDL Ratio 2.5 04/04/2014 04:48 AM      Sondra Douglass MD 3/24/2022

## 2022-03-24 NOTE — Clinical Note
Status[de-identified] INPATIENT [101]   Type of Bed: Intermediate Care [34]   Cardiac Monitoring Required?: Yes   Inpatient Hospitalization Certified Necessary for the Following Reasons: 3.  Patient receiving treatment that can only be provided in an inpatient setting (further clarification in H&P documentation)   Admitting Diagnosis: Rapid atrial fibrillation [facility]) [[id number]]   Admitting Physician: Natalie Adams [[id number]]   Attending Physician: Natalie Adams [[id number]]   Estimated Length of Stay: 3-4 Midnights   Discharge Plan[de-identified] Home with Office Follow-up

## 2022-03-24 NOTE — ED TRIAGE NOTES
Pt c/o sob for months, worse this week, denies cp, denies fever, +cough , sent here for diuretics, echo and admission

## 2022-03-24 NOTE — PATIENT INSTRUCTIONS
Please proceed to [facility] ER for atrial fibrillation and shortness of breath

## 2022-03-24 NOTE — PROGRESS NOTES
Report called to [facility] [place] [facility] that pt coming in personal vehicle for Afib with shortness of breath

## 2022-03-24 NOTE — H&P
[address] [facility] Adult  Hospitalist Group  History and Physical    Date of Service:  3/24/2022  Primary Care Provider: Lance Purcell MD  Source of information: The patient    Chief Complaint: Shortness of Breath      History of Presenting Illness:   Raul Crockett is a 64 y.o. female who presents with sob     History of hypertension, hyperlipidemia, atrial fibrillation, arthritis, psoriasis, skin cancer, degenerative disc disease, fibromyalgia, GERD, depression/anxiety, thyroid disease. She presents with complaints of shortness of breath that has been increasing over the last 2 months. No fever. She does report achy left-sided chest pain that began today. It radiates to her back. She states it is worse with inspiration. She states that she stopped all of her medicine several months ago to see how things would go without them. She has had 2 months of a dry cough. The dyspnea is worse with exertion. No lower extremity swelling. . she was evaluated by at the office by her cardilogist Dr. Oziel Rodriguez and she was referred to ER for further management. In ER, noticed rapid afib, she was given cardiziem 10mg iv one dose, HR around 100 now. REVIEW OF SYSTEMS:  General: HPI, no changes of weight.  No changes of appetite  HEENT: no headache, no vision changes, no nose discharge, no hearing changes   RES: no wheezing,hpi,   CVS: hpi, no chest pain   Muscular: no joint swelling, no muscle pain, no leg swelling  Skin: no rash, no itching   GI: no vomiting,increasing abdominal distension lately   : no dysuria, no hematuria  Hemo: no gum bleeding, no petechial   Neuro: no sensation changes, no focal weakness   Endo: no polydipsia   Psych: denied depression     Past Medical History:   Diagnosis Date    Arthritis     DJD    Autoimmune disease ([address]     psoriasis/others r/t autoimmune diseases - pt unsure of names    Cancer ([address]     skin cancer - squamous and basal cell    Cerebellar ataxia ([address]     no longer has    Chronic pain     DDD/fibromyalgia    Coagulation disorder (HCC)     free bleeder - no diagnosis    GERD (gastroesophageal reflux disease)     History of degenerative disc disease     Hypercholesterolemia     Lexiscan 3-13-18 WNL EF 80% [facility]    Hypertension     Liver disease     fatty liver    Orthostasis 5/13/2014    Other and unspecified symptoms and signs involving general sensations and perceptions     previous hx \"cerebellar ataxia\"    Other ill-defined conditions(799.89)     fibromyalgia    Psychiatric disorder     depression/anxiety    Thyroid disease       Past Surgical History:   Procedure Laterality Date    COLONOSCOPY N/A 6/12/2019    COLON[facility] 18 HX BUNIONECTOMY Bilateral     HX CATARACT REMOVAL Bilateral     HX CERVICAL FUSION      HX CHOLECYSTECTOMY  2005    HX GI      colonoscopy/EGD    HX HEENT Right 1972    surgery for torn retina     HX HYSTERECTOMY      HX ORTHOPAEDIC  1972    left knee surgery - for floating knee cap    HX ORTHOPAEDIC  1976    left knee cap removed    HX ORTHOPAEDIC Left     3 knee surgeries - 1972/1976/1981 - spurs removed    HX OTHER SURGICAL      numerous skin cancers removed    HX PARTIAL THYROIDECTOMY      benign tumor    HX RETINAL DETACHMENT REPAIR Right     sclera buckle     Prior to Admission medications    Medication Sig Start Date End Date Taking? Authorizing Provider   FLUoxetine (PROzac) 40 mg capsule Take 80 mg by mouth daily. Provider, Historical   LORazepam (ATIVAN) 1 mg tablet Take  by mouth nightly as needed for Anxiety. Patient not taking: Reported on 3/24/2022    Provider, Historical   atorvastatin (LIPITOR) 20 mg tablet Take 20 mg by mouth nightly. Patient not taking: Reported on 3/24/2022    Provider, Historical   omeprazole (PRILOSEC) 40 mg capsule Take 40 mg by mouth daily.   Patient not taking: Reported on 3/24/2022    Provider, Historical   L gasseri/B bifidum/B longum ([facility] Mochila PO) Take  by mouth. Takes one po once daily. Patient not taking: Reported on 3/24/2022    Provider, Historical   sertraline (ZOLOFT) 100 mg tablet Take 100 mg by mouth daily. Patient not taking: Reported on 3/24/2022    Provider, Historical   biotin 1 mg tab Take 1,000 mcg by mouth nightly. Patient not taking: Reported on 3/24/2022    Provider, Historical   traZODone (DESYREL) 50 mg tablet Take 1 Tab by mouth nightly. Patient not taking: Reported on 3/24/2022 6/3/16   Kelly Moore MD   metoprolol tartrate (LOPRESSOR) 25 mg tablet Take 1 Tab by mouth daily. Patient not taking: Reported on 3/24/2022 6/3/16   Kelly Moore MD   aspirin delayed-release 81 mg tablet Take 81 mg by mouth daily. Patient not taking: Reported on 3/24/2022    Provider, Historical   FOLIC ACID PO Take 602 mg by mouth daily. Patient not taking: Reported on 3/24/2022    Provider, Historical   Cholecalciferol, Vitamin D3, (VITAMIN D3) 2,000 unit cap capsule Take 2,000 Units by mouth daily. Patient not taking: Reported on 3/24/2022 1/4/16   Provider, Historical   hydrochlorothiazide (HYDRODIURIL) 25 mg tablet 1 tab po daily  Patient not taking: Reported on 3/24/2022 2/18/16   Kelly Moore MD   cyanocobalamin (VITAMIN B-12) 1,000 mcg tablet Take 1 Tab by mouth two (2) times a day. Patient not taking: Reported on 3/24/2022 4/16/14   Darwin Bolanos MD   potassium chloride (K-DUR, KLOR-CON) 20 mEq tablet Take 20 mEq by mouth daily. Patient not taking: Reported on 3/24/2022    Provider, Historical     Allergies   Allergen Reactions    Latex Other (comments)     Excoriation of skin    Latex Rash    Adhesive Other (comments)     Excoriation of skin    Adhesive Rash     Excoriates the skin.        Family History   Problem Relation Age of Onset    Heart Surgery Brother         mitral valve replacement    Other Brother non-Hodgkins lymphoma    Stroke Brother     Hypertension Mother     Elevated Lipids Mother    Dorrance Jenise Problems Mother         \"sleeps forever after\"/\"hungover\"    Heart Failure Father     Other Father         pacemaker    Heart Disease Father     Heart Attack Nephew 54      Social History:  reports that she quit smoking about 14 years ago. She has never used smokeless tobacco. She reports that she does not drink alcohol and does not use drugs. Family and social history were personally reviewed, all pertinent and relevant details are outlined as above. Objective:     Visit Vitals  /71 (BP 1 Location: Right upper arm, BP Patient Position: At rest)   Pulse 99   Temp 97.8 °F (36.6 °C)   Resp 22   SpO2 92%    O2 Flow Rate (L/min): 2 l/min O2 Device: Nasal cannula    PHYSICAL EXAM:   General: Alert x oriented x 3, awake, no acute distress   HEENT: PEERL, EOMI, moist mucus membranes  Neck: Supple, no JVD, no meningeal signs  Chest: no wheezing, no crackles. CVS: RRR, S1 S2 heard, no murmurs/rubs/gallops  Abd: Soft, non-tender, non-distended, +bowel sounds   Ext: No clubbing, no cyanosis, no edema  Neuro/Psych: Pleasant mood and affect, CN 2-12 grossly intact, sensory grossly within normal limit, Strength 5/5 in all extremities, DTR 1+ x 4  Cap refill: Brisk, less than 3 seconds  Pulses: 2+, symmetric in all extremities  Skin: Warm, dry, without rashes or lesions    Data Review: All diagnostic labs and studies have been reviewed.     Abnormal Labs Reviewed   CBC WITH AUTOMATED DIFF - Abnormal; Notable for the following components:       Result Value    RDW 15.7 (*)     NEUTROPHILS 78 (*)     All other components within normal limits   METABOLIC PANEL, COMPREHENSIVE - Abnormal; Notable for the following components:    Sodium 135 (*)     Glucose 131 (*)     Creatinine 1.08 (*)     BUN/Creatinine ratio 9 (*)     GFR est AA 59 (*)     GFR est non-AA 49 (*)     Bilirubin, total 1.1 (*)     All other components within normal limits   NT-PRO BNP - Abnormal; Notable for the following components:    NT pro-BNP 12,153 (*)     All other components within normal limits       All Micro Results     None          IMAGING:   XR CHEST PORT   Final Result   1. There is mild cardiomegaly. There is blunting of the costophrenic angles   consistent with small pleural effusions. There is mild atelectasis at the right   base. No pneumonia. No pulmonary edema           ECG/ECHO:    Results for orders placed or performed during the hospital encounter of 03/24/22   EKG, 12 LEAD, INITIAL   Result Value Ref Range    Ventricular Rate 112 BPM    QRS Duration 90 ms    Q-T Interval 382 ms    QTC Calculation (Bezet) 521 ms    Calculated R Axis -13 degrees    Calculated T Axis 82 degrees    Diagnosis       Atrial fibrillation with rapid ventricular response  Anteroseptal infarct , age undetermined  Abnormal ECG  When compared with ECG of 28-JUN-2015 19:21,  Atrial fibrillation has replaced Sinus rhythm  Vent. rate has increased BY  42 BPM  Anteroseptal infarct is now present  ST no longer depressed in Anterior leads  Nonspecific T wave abnormality no longer evident in Inferior leads  Nonspecific T wave abnormality now evident in Anterior leads          Assessment:   Given the patient's current clinical presentation, there is a high level of concern for decompensation if discharged from the emergency department. Complex decision making was performed, which includes reviewing the patient's available past medical records, laboratory results, and imaging studies. Active Problems:    Rapid atrial fibrillation ([address] (3/24/2022)      Plan:     1. Rapid afib: will start metoprolol 50mg bid, echo pending. Sc lovenox bid now, she will need [facility] for stroke prevention, further medication per cardiologist.   2. Acute CHF: start lasix 40mg daily, following cr, weight and output.    3. abd distention: etiology unclear, may due to liver congestion, abd/pelvic ct   4. HTN: stopped meds for 1-2 months, restart metoprolol now, will adjust.           DIET: No diet orders on file   ISOLATION PRECAUTIONS: There are currently no Active Isolations  CODE STATUS: Prior   DVT PROPHYLAXIS: Lovenox  FUNCTIONAL STATUS PRIOR TO HOSPITALIZATION: Ambulatory and capable of self-care but relies on assistive devices (rolling walker/cane). EARLY MOBILITY ASSESSMENT: Recommend a consult to the Mobility Team  ANTICIPATED DISCHARGE: 24-48 hours. EMERGENCY CONTACT/SURROGATE DECISION MAKER: pt makes her own decision, son updated bedside     CRITICAL CARE WAS PERFORMED FOR THIS ENCOUNTER: NO.      Signed By: Yasmin Foley MD     March 24, 2022         Please note that this dictation may have been completed with Dragon, the computer voice recognition software. Quite often unanticipated grammatical, syntax, homophones, and other interpretive errors are inadvertently transcribed by the computer software. Please disregard these errors. Please excuse any errors that have escaped final proofreading.

## 2022-03-24 NOTE — PROGRESS NOTES
Visit Vitals  /80 (BP 1 Location: Right arm, BP Patient Position: Sitting, BP Cuff Size: Adult)   Pulse (!) 114   Resp 14   Ht 5' 6\" (1.676 m)   Wt 153 lb 12.8 oz (69.8 kg)   SpO2 98%   BMI 24.82 kg/m²

## 2022-03-25 ENCOUNTER — APPOINTMENT (OUTPATIENT)
Dept: NUCLEAR MEDICINE | Age: 77
DRG: 291 | End: 2022-03-25
Attending: NURSE PRACTITIONER
Payer: MEDICARE

## 2022-03-25 ENCOUNTER — APPOINTMENT (OUTPATIENT)
Dept: NON INVASIVE DIAGNOSTICS | Age: 77
DRG: 291 | End: 2022-03-25
Attending: NURSE PRACTITIONER
Payer: MEDICARE

## 2022-03-25 PROBLEM — I50.9 ACUTE CHF (CONGESTIVE HEART FAILURE) (HCC): Status: ACTIVE | Noted: 2022-03-25

## 2022-03-25 LAB
ALBUMIN SERPL-MCNC: 3.4 G/DL (ref 3.5–5)
ALBUMIN/GLOB SERPL: 1.1 {RATIO} (ref 1.1–2.2)
ALP SERPL-CCNC: 75 U/L (ref 45–117)
ALT SERPL-CCNC: 13 U/L (ref 12–78)
ANION GAP SERPL CALC-SCNC: 7 MMOL/L (ref 5–15)
AST SERPL-CCNC: 14 U/L (ref 15–37)
BASOPHILS # BLD: 0.1 K/UL (ref 0–0.1)
BASOPHILS NFR BLD: 1 % (ref 0–1)
BILIRUB SERPL-MCNC: 1.4 MG/DL (ref 0.2–1)
BNP SERPL-MCNC: 8384 PG/ML
BUN SERPL-MCNC: 12 MG/DL (ref 6–20)
BUN/CREAT SERPL: 12 (ref 12–20)
CALCIUM SERPL-MCNC: 8.5 MG/DL (ref 8.5–10.1)
CHLORIDE SERPL-SCNC: 105 MMOL/L (ref 97–108)
CO2 SERPL-SCNC: 24 MMOL/L (ref 21–32)
CREAT SERPL-MCNC: 1.04 MG/DL (ref 0.55–1.02)
DIFFERENTIAL METHOD BLD: ABNORMAL
EOSINOPHIL # BLD: 0.2 K/UL (ref 0–0.4)
EOSINOPHIL NFR BLD: 3 % (ref 0–7)
ERYTHROCYTE [DISTWIDTH] IN BLOOD BY AUTOMATED COUNT: 15.4 % (ref 11.5–14.5)
GLOBULIN SER CALC-MCNC: 3.2 G/DL (ref 2–4)
GLUCOSE SERPL-MCNC: 97 MG/DL (ref 65–100)
HCT VFR BLD AUTO: 35.8 % (ref 35–47)
HGB BLD-MCNC: 11.2 G/DL (ref 11.5–16)
IMM GRANULOCYTES # BLD AUTO: 0 K/UL (ref 0–0.04)
IMM GRANULOCYTES NFR BLD AUTO: 0 % (ref 0–0.5)
LYMPHOCYTES # BLD: 1.6 K/UL (ref 0.8–3.5)
LYMPHOCYTES NFR BLD: 23 % (ref 12–49)
MAGNESIUM SERPL-MCNC: 2 MG/DL (ref 1.6–2.4)
MCH RBC QN AUTO: 25.6 PG (ref 26–34)
MCHC RBC AUTO-ENTMCNC: 31.3 G/DL (ref 30–36.5)
MCV RBC AUTO: 81.9 FL (ref 80–99)
MONOCYTES # BLD: 0.7 K/UL (ref 0–1)
MONOCYTES NFR BLD: 11 % (ref 5–13)
NEUTS SEG # BLD: 4.3 K/UL (ref 1.8–8)
NEUTS SEG NFR BLD: 62 % (ref 32–75)
NRBC # BLD: 0 K/UL (ref 0–0.01)
NRBC BLD-RTO: 0 PER 100 WBC
PHOSPHATE SERPL-MCNC: 4 MG/DL (ref 2.6–4.7)
PLATELET # BLD AUTO: 325 K/UL (ref 150–400)
PMV BLD AUTO: 10.7 FL (ref 8.9–12.9)
POTASSIUM SERPL-SCNC: 3.2 MMOL/L (ref 3.5–5.1)
PROT SERPL-MCNC: 6.6 G/DL (ref 6.4–8.2)
RBC # BLD AUTO: 4.37 M/UL (ref 3.8–5.2)
SODIUM SERPL-SCNC: 136 MMOL/L (ref 136–145)
TSH SERPL DL<=0.05 MIU/L-ACNC: 1.02 UIU/ML (ref 0.36–3.74)
WBC # BLD AUTO: 6.9 K/UL (ref 3.6–11)

## 2022-03-25 PROCEDURE — P9047 ALBUMIN (HUMAN), 25%, 50ML: HCPCS | Performed by: FAMILY MEDICINE

## 2022-03-25 PROCEDURE — 78452 HT MUSCLE IMAGE SPECT MULT: CPT

## 2022-03-25 PROCEDURE — 36415 COLL VENOUS BLD VENIPUNCTURE: CPT

## 2022-03-25 PROCEDURE — 83735 ASSAY OF MAGNESIUM: CPT

## 2022-03-25 PROCEDURE — 74011250636 HC RX REV CODE- 250/636: Performed by: HOSPITALIST

## 2022-03-25 PROCEDURE — 77010033678 HC OXYGEN DAILY

## 2022-03-25 PROCEDURE — 83880 ASSAY OF NATRIURETIC PEPTIDE: CPT

## 2022-03-25 PROCEDURE — 74011250636 HC RX REV CODE- 250/636: Performed by: FAMILY MEDICINE

## 2022-03-25 PROCEDURE — 99233 SBSQ HOSP IP/OBS HIGH 50: CPT | Performed by: SPECIALIST

## 2022-03-25 PROCEDURE — 74011250637 HC RX REV CODE- 250/637: Performed by: HOSPITALIST

## 2022-03-25 PROCEDURE — A9500 TC99M SESTAMIBI: HCPCS

## 2022-03-25 PROCEDURE — 74011250637 HC RX REV CODE- 250/637: Performed by: NURSE PRACTITIONER

## 2022-03-25 PROCEDURE — 65660000000 HC RM CCU STEPDOWN

## 2022-03-25 PROCEDURE — 85025 COMPLETE CBC W/AUTO DIFF WBC: CPT

## 2022-03-25 PROCEDURE — 74011250636 HC RX REV CODE- 250/636: Performed by: SPECIALIST

## 2022-03-25 PROCEDURE — 84443 ASSAY THYROID STIM HORMONE: CPT

## 2022-03-25 PROCEDURE — 84100 ASSAY OF PHOSPHORUS: CPT

## 2022-03-25 PROCEDURE — 80053 COMPREHEN METABOLIC PANEL: CPT

## 2022-03-25 PROCEDURE — 74011000250 HC RX REV CODE- 250: Performed by: HOSPITALIST

## 2022-03-25 RX ORDER — LOPERAMIDE HYDROCHLORIDE 2 MG/1
4 CAPSULE ORAL ONCE
Status: COMPLETED | OUTPATIENT
Start: 2022-03-25 | End: 2022-03-25

## 2022-03-25 RX ORDER — LOPERAMIDE HYDROCHLORIDE 2 MG/1
2 CAPSULE ORAL AS NEEDED
Status: DISCONTINUED | OUTPATIENT
Start: 2022-03-25 | End: 2022-03-28 | Stop reason: HOSPADM

## 2022-03-25 RX ORDER — ALBUMIN HUMAN 250 G/1000ML
12.5 SOLUTION INTRAVENOUS EVERY 6 HOURS
Status: DISPENSED | OUTPATIENT
Start: 2022-03-25 | End: 2022-03-26

## 2022-03-25 RX ORDER — POTASSIUM CHLORIDE 750 MG/1
20 TABLET, FILM COATED, EXTENDED RELEASE ORAL
Status: DISCONTINUED | OUTPATIENT
Start: 2022-03-25 | End: 2022-03-25

## 2022-03-25 RX ORDER — SODIUM CHLORIDE 0.9 % (FLUSH) 0.9 %
20 SYRINGE (ML) INJECTION AS NEEDED
Status: DISCONTINUED | OUTPATIENT
Start: 2022-03-25 | End: 2022-03-28 | Stop reason: HOSPADM

## 2022-03-25 RX ORDER — TETRAKIS(2-METHOXYISOBUTYLISOCYANIDE)COPPER(I) TETRAFLUOROBORATE 1 MG/ML
10.3 INJECTION, POWDER, LYOPHILIZED, FOR SOLUTION INTRAVENOUS
Status: COMPLETED | OUTPATIENT
Start: 2022-03-25 | End: 2022-03-25

## 2022-03-25 RX ORDER — TETRAKIS(2-METHOXYISOBUTYLISOCYANIDE)COPPER(I) TETRAFLUOROBORATE 1 MG/ML
32.5 INJECTION, POWDER, LYOPHILIZED, FOR SOLUTION INTRAVENOUS
Status: COMPLETED | OUTPATIENT
Start: 2022-03-25 | End: 2022-03-25

## 2022-03-25 RX ORDER — LORAZEPAM 1 MG/1
1 TABLET ORAL
Status: DISCONTINUED | OUTPATIENT
Start: 2022-03-25 | End: 2022-03-28 | Stop reason: HOSPADM

## 2022-03-25 RX ORDER — POTASSIUM CHLORIDE 750 MG/1
20 TABLET, FILM COATED, EXTENDED RELEASE ORAL 2 TIMES DAILY
Status: COMPLETED | OUTPATIENT
Start: 2022-03-25 | End: 2022-03-25

## 2022-03-25 RX ADMIN — METOPROLOL TARTRATE 50 MG: 25 TABLET, FILM COATED ORAL at 08:54

## 2022-03-25 RX ADMIN — LORAZEPAM 1 MG: 1 TABLET ORAL at 22:18

## 2022-03-25 RX ADMIN — POTASSIUM CHLORIDE 20 MEQ: 750 TABLET, EXTENDED RELEASE ORAL at 17:04

## 2022-03-25 RX ADMIN — FUROSEMIDE 40 MG: 10 INJECTION, SOLUTION INTRAMUSCULAR; INTRAVENOUS at 08:54

## 2022-03-25 RX ADMIN — LOPERAMIDE HYDROCHLORIDE 4 MG: 2 CAPSULE ORAL at 22:18

## 2022-03-25 RX ADMIN — REGADENOSON 0.4 MG: 0.08 INJECTION, SOLUTION INTRAVENOUS at 12:41

## 2022-03-25 RX ADMIN — TETRAKIS(2-METHOXYISOBUTYLISOCYANIDE)COPPER(I) TETRAFLUOROBORATE 10.3 MILLICURIE: 1 INJECTION, POWDER, LYOPHILIZED, FOR SOLUTION INTRAVENOUS at 10:35

## 2022-03-25 RX ADMIN — ENOXAPARIN SODIUM 70 MG: 100 INJECTION SUBCUTANEOUS at 17:04

## 2022-03-25 RX ADMIN — Medication 20 ML: at 12:41

## 2022-03-25 RX ADMIN — ENOXAPARIN SODIUM 70 MG: 100 INJECTION SUBCUTANEOUS at 03:44

## 2022-03-25 RX ADMIN — POTASSIUM CHLORIDE 20 MEQ: 750 TABLET, EXTENDED RELEASE ORAL at 08:54

## 2022-03-25 RX ADMIN — TETRAKIS(2-METHOXYISOBUTYLISOCYANIDE)COPPER(I) TETRAFLUOROBORATE 32.5 MILLICURIE: 1 INJECTION, POWDER, LYOPHILIZED, FOR SOLUTION INTRAVENOUS at 13:00

## 2022-03-25 RX ADMIN — SODIUM CHLORIDE, PRESERVATIVE FREE 10 ML: 5 INJECTION INTRAVENOUS at 21:40

## 2022-03-25 RX ADMIN — METOPROLOL TARTRATE 50 MG: 25 TABLET, FILM COATED ORAL at 21:40

## 2022-03-25 RX ADMIN — SODIUM CHLORIDE, PRESERVATIVE FREE 10 ML: 5 INJECTION INTRAVENOUS at 04:52

## 2022-03-25 RX ADMIN — ALBUMIN (HUMAN) 12.5 G: 0.25 INJECTION, SOLUTION INTRAVENOUS at 18:55

## 2022-03-25 RX ADMIN — PANTOPRAZOLE SODIUM 40 MG: 40 TABLET, DELAYED RELEASE ORAL at 06:41

## 2022-03-25 RX ADMIN — FLUOXETINE 80 MG: 20 CAPSULE ORAL at 08:54

## 2022-03-25 NOTE — NURSE NAVIGATOR
Chart reviewed by Heart Failure Nurse Navigator. Heart Failure database completed. EF:  Echo pending    ACEi/ARB/ARNi: echo pending    BB: echo pending    Aldosterone Antagonist: echo pending    Obstructive Sleep Apnea Screening: N/1   Referred to Sleep Medicine:     CRT not indicated. NYHA Functional Class III      Heart Failure Teach Back in Patient Education. Heart Failure Avoiding Triggers on Discharge Instructions. Cardiologist: SHERIN      Post discharge follow up phone call to be made within 48-72 hours of discharge.

## 2022-03-25 NOTE — PROGRESS NOTES
Bedside shift change report given to grisel wong rn (oncoming nurse) by Nivia Hernandez (offgoing nurse). Report included the following information SBAR and Kardex.

## 2022-03-25 NOTE — PROGRESS NOTES
Bedside shift change report given to [address]  (oncoming nurse) by Jossy Phelps  (offgoing nurse). Report included the following information SBAR, Kardex, MAR and Recent Results.

## 2022-03-25 NOTE — PROGRESS NOTES
Transition of Care Plan   RUR- 7% Low Risk   DISPOSITION: The disposition plan is pending medical progression and recommendation.  F/U with PCP/Specialist     Transport: Family - Son Carlotta Kenney - [phone]    Reason for Admission: \"SOB\"                    RUR Score:  7% Low Risk                   Plan for utilizing home health:  Pending recommendation. PCP: First and Last name:  Edgard Steward MD     Name of Practice: [facility]   Are you a current patient: Yes/No: Yes   Approximate date of last visit: 2 years ago (before covid)   Can you participate in a virtual visit with your PCP: N/A                    Current Advanced Directive/Advance Care Plan: Full Code  Has ACP documentation on file at this time. Healthcare Decision Maker:   Click here to complete Buildingeye including selection of the Healthcare Decision Maker Relationship (ie \"Primary\")   Son                        Transition of Care Plan:  Pending recommendation. Reviewed chart for transitions of care and discussed in rounds. CM met with patient at bedside to explain role and offer support. Patient is alert and oriented x4 and confirmed demographics. Baseline: DME - none  ADLs/IDALS: Independent   Previous Home Health: Yes, [facility] (unsure of name of agency at this time)  Previous SNF/IPR: Yes, [facility] (in the past)  ER Contact: Son - Edwin Walters    Patient lives in an apartment by herself. Patient is independent with ADLs/IDALs. Patient uses DMEs (none) to ambulate. Patient's preferred pharmacy is [facility] located on [address] [place] [address] for her prescriptions. Patient's son is expected to transport at discharge. Care Management Interventions  PCP Verified by CM: Yes  Palliative Care Criteria Met (RRAT>21 & CHF Dx)?: No  Mode of Transport at Discharge:  Other (see comment)  Transition of Care Consult (CM Consult): Discharge Planning  MyChart Signup: Yes  Discharge Durable Medical Equipment: No  Physical Therapy Consult: No  Occupational Therapy Consult: No  Speech Therapy Consult: No  Support Systems: Child(yue)  Confirm Follow Up Transport: Family  The Patient and/or Patient Representative was Provided with a Choice of Provider and Agrees with the Discharge Plan?: Yes  Freedom of Choice List was Provided with Basic Dialogue that Supports the Patient's Individualized Plan of Care/Goals, Treatment Preferences and Shares the Quality Data Associated with the Providers?: Yes  [employer] Information Provided?: No  Discharge Location  Patient Expects to be Discharged to[de-identified] [address], MSW, CRM, LMHP-e  Available in Perfect Serve

## 2022-03-25 NOTE — PROGRESS NOTES
Cardiology Progress Note            Admit Date: 3/24/2022  Admit Diagnosis: Rapid atrial fibrillation [facility]) [I48.91]  Date: 3/25/2022     Time: 12:02 PM    Subjective:  64 y.o. female who presented with 2 months of tachycardia and WOLFF. Found to have afib with RVR, likely CHF. HR now controlled on Lopressor. Today, she reports she slept well. Denies chest pain but did have some chest tightness with deep breathing yestserday. SOB is much better aspecially after O2 and nebulizer treatments. Remains in Afib but HR is controlled       Assessment and Plan     1. Afib with RVR   -Remains in afib but HR is controlled   -Continue Lopressor 50 mg BID   -check TSH   -Echocardiogram pending   -ODA7lw1mhog=9. Continue lovenox 1 mg/kg for now. If nuclear stress test ok, then plan to change to Eliquis    2. Chest discomfort, reports of WOLFF   -none now   -HS trop 24 yesterday   -Nuclear stress test and echocardiogram today. 3. Suspect possible cardiomyopathy   -Echo pending   -pro BNP trending down (now 8384)   -Continue Lasix 40 mg IV daily    4. Mildly elevated creatinine   -improved from yesterday, now 1.25   -uncertain baseline. 5. Hypokalemia   -repleted, mg=2    Remains in afib, HR controlled. SOB much improved. Continue IV diuretics. Echocardiogram today and proceed with nuclear stress today. Lovenox to [facility] if nuclear stress test ok.      PMH  Past Medical History:   Diagnosis Date    Arthritis     DJD    Autoimmune disease ([address]     psoriasis/others r/t autoimmune diseases - pt unsure of names    Cancer ([address]     skin cancer - squamous and basal cell    Cerebellar ataxia (HCC)     no longer has    Chronic pain     DDD/fibromyalgia    Coagulation disorder ([address]     free bleeder - no diagnosis    GERD (gastroesophageal reflux disease)     History of degenerative disc disease     Hypercholesterolemia     Lexiscan 3-13-18 WNL EF 80% Cristofer    Hypertension     Liver disease     fatty liver    Orthostasis 2014    Other and unspecified symptoms and signs involving general sensations and perceptions     previous hx \"cerebellar ataxia\"    Other ill-defined conditions(799.89)     fibromyalgia    Psychiatric disorder     depression/anxiety    Thyroid disease       Social Hx  Social History     Socioeconomic History    Marital status:      Spouse name: Not on file    Number of children: Not on file    Years of education: Not on file    Highest education level: Not on file   Occupational History    Not on file   Tobacco Use    Smoking status: Former Smoker     Quit date: 2008     Years since quittin.1    Smokeless tobacco: Never Used    Tobacco comment: quit smoking cigarettes   Substance and Sexual Activity    Alcohol use: No    Drug use: No    Sexual activity: Not on file   Other Topics Concern    Not on file   Social History Narrative    ** Merged History Encounter **          Social Determinants of Health     Financial Resource Strain:     Difficulty of Paying Living Expenses: Not on file   Food Insecurity:     Worried About [address] [employer] [employer] in the Last Year: Not on file    Mitch of Food in the Last Year: Not on file   Transportation Needs:     Lack of Transportation (Medical): Not on file    Lack of Transportation (Non-Medical):  Not on file   Physical Activity:     Days of Exercise per Week: Not on file    Minutes of Exercise per Session: Not on file   Stress:     Feeling of Stress : Not on file   Social Connections:     Frequency of Communication with Friends and Family: Not on file    Frequency of Social Gatherings with Friends and Family: Not on file    Attends Muslim Services: Not on file    Active Member of Clubs or Organizations: Not on file    Attends Club or Organization Meetings: Not on file    Marital Status: Not on file   Intimate Partner Violence:     Fear of Current or Ex-Partner: Not on file    Emotionally Abused: Not on file    Physically Abused: Not on file    Sexually Abused: Not on file   Housing Stability:     Unable to Pay for Housing in the Last Year: Not on file    Number of Places Lived in the Last Year: Not on file    Unstable Housing in the Last Year: Not on file       ROS:  Pertinent items are noted in HPI. Objective:      Physical Exam:                Visit Vitals  /74 (BP 1 Location: Left upper arm, BP Patient Position: At rest)   Pulse 94   Temp 98.2 °F (36.8 °C)   Resp 18   SpO2 97%          General Appearance:   Well developed, well nourished,alert and oriented x 3, and   individual in no acute distress. Ears/Nose/Mouth/Throat:    Hearing grossly normal.         Neck:  Supple. Chest:    Lungs to auscultation bilaterally. Cardiovascular:   Irregularly irregular rate and rhythm, S1, S2 normal, no murmur. Abdomen:    Soft, non-tender, bowel sounds are active. Extremities:  No edema bilaterally. Skin:  Warm and dry. Telemetry: afib, rate controlled          Data Review:    Labs:    Recent Results (from the past 24 hour(s))   CBC WITH AUTOMATED DIFF    Collection Time: 03/25/22  3:50 AM   Result Value Ref Range    WBC 6.9 3.6 - 11.0 K/uL    RBC 4.37 3.80 - 5.20 M/uL    HGB 11.2 (L) 11.5 - 16.0 g/dL    HCT 35.8 35.0 - 47.0 %    MCV 81.9 80.0 - 99.0 FL    MCH 25.6 (L) 26.0 - 34.0 PG    MCHC 31.3 30.0 - 36.5 g/dL    RDW 15.4 (H) 11.5 - 14.5 %    PLATELET 071 156 - 125 K/uL    MPV 10.7 8.9 - 12.9 FL    NRBC 0.0 0  WBC    ABSOLUTE NRBC 0.00 0.00 - 0.01 K/uL    NEUTROPHILS 62 32 - 75 %    LYMPHOCYTES 23 12 - 49 %    MONOCYTES 11 5 - 13 %    EOSINOPHILS 3 0 - 7 %    BASOPHILS 1 0 - 1 %    IMMATURE GRANULOCYTES 0 0.0 - 0.5 %    ABS. NEUTROPHILS 4.3 1.8 - 8.0 K/UL    ABS. LYMPHOCYTES 1.6 0.8 - 3.5 K/UL    ABS. MONOCYTES 0.7 0.0 - 1.0 K/UL    ABS. EOSINOPHILS 0.2 0.0 - 0.4 K/UL    ABS. BASOPHILS 0.1 0.0 - 0.1 K/UL    ABS. IMM.  GRANS. 0.0 0.00 - 0.04 K/UL    DF AUTOMATED     METABOLIC PANEL, COMPREHENSIVE    Collection Time: 03/25/22  3:50 AM   Result Value Ref Range    Sodium 136 136 - 145 mmol/L    Potassium 3.2 (L) 3.5 - 5.1 mmol/L    Chloride 105 97 - 108 mmol/L    CO2 24 21 - 32 mmol/L    Anion gap 7 5 - 15 mmol/L    Glucose 97 65 - 100 mg/dL    BUN 12 6 - 20 MG/DL    Creatinine 1.04 (H) 0.55 - 1.02 MG/DL    BUN/Creatinine ratio 12 12 - 20      GFR est AA >60 >60 ml/min/1.73m2    GFR est non-AA 51 (L) >60 ml/min/1.73m2    Calcium 8.5 8.5 - 10.1 MG/DL    Bilirubin, total 1.4 (H) 0.2 - 1.0 MG/DL    ALT (SGPT) 13 12 - 78 U/L    AST (SGOT) 14 (L) 15 - 37 U/L    Alk.  phosphatase 75 45 - 117 U/L    Protein, total 6.6 6.4 - 8.2 g/dL    Albumin 3.4 (L) 3.5 - 5.0 g/dL    Globulin 3.2 2.0 - 4.0 g/dL    A-G Ratio 1.1 1.1 - 2.2     MAGNESIUM    Collection Time: 03/25/22  3:50 AM   Result Value Ref Range    Magnesium 2.0 1.6 - 2.4 mg/dL   PHOSPHORUS    Collection Time: 03/25/22  3:50 AM   Result Value Ref Range    Phosphorus 4.0 2.6 - 4.7 MG/DL   NT-PRO BNP    Collection Time: 03/25/22  3:50 AM   Result Value Ref Range    NT pro-BNP 8,384 (H) <450 PG/ML   NUCLEAR CARDIAC STRESS TEST    Collection Time: 03/25/22 10:37 AM   Result Value Ref Range    Stress Target  bpm          Radiology:        Current Facility-Administered Medications   Medication Dose Route Frequency    potassium chloride SR (KLOR-CON 10) tablet 20 mEq  20 mEq Oral BID    regadenoson (LEXISCAN) injection 0.4 mg  0.4 mg IntraVENous RAD ONCE    saline peripheral flush soln 20 mL  20 mL InterCATHeter PRN    albumin human 25% (BUMINATE) solution 12.5 g  12.5 g IntraVENous Q6H    metoprolol tartrate (LOPRESSOR) tablet 50 mg  50 mg Oral BID    sodium chloride (NS) flush 5-40 mL  5-40 mL IntraVENous Q8H    sodium chloride (NS) flush 5-40 mL  5-40 mL IntraVENous PRN    acetaminophen (TYLENOL) tablet 650 mg  650 mg Oral Q6H PRN    Or    acetaminophen (TYLENOL) suppository 650 mg  650 mg Rectal Q6H PRN    polyethylene glycol (MIRALAX) packet 17 g  17 g Oral DAILY PRN    ondansetron (ZOFRAN ODT) tablet 4 mg  4 mg Oral Q8H PRN    Or    ondansetron (ZOFRAN) injection 4 mg  4 mg IntraVENous Q6H PRN    furosemide (LASIX) injection 40 mg  40 mg IntraVENous DAILY    enoxaparin (LOVENOX) injection 70 mg  70 mg SubCUTAneous Q12H    pantoprazole (PROTONIX) tablet 40 mg  40 mg Oral ACB    FLUoxetine (PROzac) capsule 80 mg  80 mg Oral DAILY    metoprolol (LOPRESSOR) injection 5 mg  5 mg IntraVENous Q6H PRN          Rizwana Cortez.  JR Herring     [facility] [address] [facility] [address]   [facility], [facility]   [phone]

## 2022-03-25 NOTE — PROGRESS NOTES
Problem: General Medical Care Plan  Goal: *Vital signs within specified parameters  Outcome: Progressing Towards Goal  Goal: *Labs within defined limits  Outcome: Progressing Towards Goal  Goal: *Absence of infection signs and symptoms  Outcome: Progressing Towards Goal  Goal: *Optimal pain control at patient's stated goal  Outcome: Progressing Towards Goal  Goal: *Skin integrity maintained  Outcome: Progressing Towards Goal  Goal: *Fluid volume balance  Outcome: Progressing Towards Goal  Goal: *Optimize nutritional status  Outcome: Progressing Towards Goal  Goal: *Anxiety reduced or absent  Outcome: Progressing Towards Goal  Goal: *Progressive mobility and function (eg: ADL's)  Outcome: Progressing Towards Goal     Problem: Patient Education: Go to Patient Education Activity  Goal: Patient/Family Education  Outcome: Progressing Towards Goal    Aox4, VSS - RN continues to monitor A-fib and dyspnea on exertion. .. no c/o pain. .. no adverse events noted - will continue to monitor.

## 2022-03-25 NOTE — PROGRESS NOTES
[facility]                                                                                          Hospitalist Progress Note  Erica White MD  Answering service: [phone] from in house phone        Date of Service:  3/25/2022  NAME:  Kimberly Rucker  :  1945  MRN:  [id number]      Admission Summary:   Kimberly Rucker is a 64 y.o. female who presents with sob      History of hypertension, hyperlipidemia, atrial fibrillation, arthritis, psoriasis, skin cancer, degenerative disc disease, fibromyalgia, GERD, depression/anxiety, thyroid disease.  She presents with complaints of shortness of breath that has been increasing over the last 2 months.  No fever.  She does report achy left-sided chest pain that began today.  It radiates to her back.  She states it is worse with inspiration.  She states that she stopped all of her medicine several months ago to see how things would go without them. Whitney Gaviria has had 2 months of a dry cough.  The dyspnea is worse with exertion.  No lower extremity swelling. . she was evaluated by at the office by her cardilogist Dr. Aleida Pineda and she was referred to ER for further management.      Interval history / Subjective:   Patient resting in bed, no complaint     Assessment & Plan:     Rapid atrial fibrillation  Acute on chronic diastolic congestive heart failure NYHA class III  Abdominal distention  Hypertension      Patient admitted with PRAVEEN tilley with RVR, currently rate controlled, on oral rate control agents, Lovenox twice daily, stress test per cardiology today, monitor  Nuclear stress test results pending, IV diuresis, strict I's and O's, continue to monitor  CT abdomen pelvis without any acute intra-abdominal pathology, patient symptom resolved, monitor  Labile blood pressure, will give some albumin to optimize blood pressure, in light of diuresis, monitor,      Code status: Full  DVT prophylaxis: Lovenox    Care Plan discussed with: Patient/Family  Anticipated Disposition: Home w/Family   Anticipated Discharge: Greater than 48 hours     Hospital Problems  Date Reviewed: 3/24/2022          Codes Class Noted POA    Rapid atrial fibrillation [facility]) ICD-10-CM: I48.91  ICD-9-CM: 427.31  3/24/2022 Unknown                Review of Systems:   A comprehensive review of systems was negative except for that written in the HPI. Vital Signs:    Last 24hrs VS reviewed since prior progress note. Most recent are:  Visit Vitals  /74 (BP 1 Location: Left upper arm, BP Patient Position: At rest)   Pulse 94   Temp 98.2 °F (36.8 °C)   Resp 18   SpO2 97%         Intake/Output Summary (Last 24 hours) at 3/25/2022 1133  Last data filed at 3/24/2022 2021  Gross per 24 hour   Intake 240 ml   Output --   Net 240 ml        Physical Examination:     I had a face to face encounter with this patient and independently examined them on 3/25/2022 as outlined below:          General : Alert, awake, no acute distress  HEENT: PEERL, EOMI, moist mucus membrane, TM clear  Neck: supple, no JVD, no meningeal signs  Chest: Clear to auscultation bilaterally   CVS: Irregularly  Abd: soft/ Non tender, non distended, BS physiological,   Ext: no clubbing, no cyanosis, no edema, brisk 2+ DP pulses  Neuro/Psych: No focal neurological deficit  Skin: warm     Data Review:    Review and/or order of clinical lab test      Labs:     Recent Labs     03/25/22  0350 03/24/22  1157   WBC 6.9 7.0   HGB 11.2* 12.3   HCT 35.8 39.6    346     Recent Labs     03/25/22  0350 03/24/22  1157    135*   K 3.2* 3.5    104   CO2 24 24   BUN 12 10   CREA 1.04* 1.08*   GLU 97 131*   CA 8.5 8.8   MG 2.0 2.1   PHOS 4.0  --      Recent Labs     03/25/22  0350 03/24/22  1157   ALT 13 14   AP 75 85   TBILI 1.4* 1.1*   TP 6.6 7.4   ALB 3.4* 3.8   GLOB 3.2 3.6     No results for input(s): INR, PTP, APTT, INREXT in the last 72 hours.    No results for input(s): FE, TIBC, PSAT, FERR in the last 72 hours. Lab Results   Component Value Date/Time    Folate 3.9 (L) 04/12/2014 04:11 AM      No results for input(s): PH, PCO2, PO2 in the last 72 hours. No results for input(s): CPK, CKNDX, TROIQ in the last 72 hours.     No lab exists for component: CPKMB  Lab Results   Component Value Date/Time    Cholesterol, total 182 02/18/2016 04:05 PM    HDL Cholesterol 78 02/18/2016 04:05 PM    LDL, calculated 83 02/18/2016 04:05 PM    Triglyceride 106 02/18/2016 04:05 PM    CHOL/HDL Ratio 2.4 05/13/2014 04:15 AM     No results found for: GLUCPOC  Lab Results   Component Value Date/Time    Color YELLOW/STRAW 04/14/2015 10:40 AM    Appearance CLEAR 04/14/2015 10:40 AM    Specific gravity 1.015 04/14/2015 10:40 AM    pH (UA) 6.0 04/14/2015 10:40 AM    Protein NEGATIVE  04/14/2015 10:40 AM    Glucose NEGATIVE  04/14/2015 10:40 AM    Ketone NEGATIVE  04/14/2015 10:40 AM    Bilirubin NEGATIVE  04/14/2015 10:40 AM    Urobilinogen 0.2 04/14/2015 10:40 AM    Nitrites NEGATIVE  04/14/2015 10:40 AM    Leukocyte Esterase NEGATIVE  04/14/2015 10:40 AM    Epithelial cells FEW 04/14/2015 10:40 AM    Bacteria NEGATIVE  04/14/2015 10:40 AM    WBC 0-4 04/14/2015 10:40 AM    RBC 0-5 04/14/2015 10:40 AM         Medications Reviewed:     Current Facility-Administered Medications   Medication Dose Route Frequency    potassium chloride SR (KLOR-CON 10) tablet 20 mEq  20 mEq Oral BID    regadenoson (LEXISCAN) injection 0.4 mg  0.4 mg IntraVENous RAD ONCE    saline peripheral flush soln 20 mL  20 mL InterCATHeter PRN    metoprolol tartrate (LOPRESSOR) tablet 50 mg  50 mg Oral BID    sodium chloride (NS) flush 5-40 mL  5-40 mL IntraVENous Q8H    sodium chloride (NS) flush 5-40 mL  5-40 mL IntraVENous PRN    acetaminophen (TYLENOL) tablet 650 mg  650 mg Oral Q6H PRN    Or    acetaminophen (TYLENOL) suppository 650 mg  650 mg Rectal Q6H PRN    polyethylene glycol (MIRALAX) packet 17 g  17 g Oral DAILY PRN    ondansetron (ZOFRAN ODT) tablet 4 mg  4 mg Oral Q8H PRN    Or    ondansetron (ZOFRAN) injection 4 mg  4 mg IntraVENous Q6H PRN    furosemide (LASIX) injection 40 mg  40 mg IntraVENous DAILY    enoxaparin (LOVENOX) injection 70 mg  70 mg SubCUTAneous Q12H    pantoprazole (PROTONIX) tablet 40 mg  40 mg Oral ACB    FLUoxetine (PROzac) capsule 80 mg  80 mg Oral DAILY    metoprolol (LOPRESSOR) injection 5 mg  5 mg IntraVENous Q6H PRN     ______________________________________________________________________  EXPECTED LENGTH OF STAY: - - -  ACTUAL LENGTH OF STAY:          1      Please note that this dictation was completed with [employer], the computer voice recognition software. Quite often unanticipated grammatical, syntax, homophones, and other interpretive errors are inadvertently transcribed by the computer software. Please disregard these errors. Please excuse any errors that have escaped final proofreading.                 Claire Bose MD

## 2022-03-26 PROBLEM — I10 PRIMARY HYPERTENSION: Status: ACTIVE | Noted: 2022-03-26

## 2022-03-26 PROCEDURE — 74011250637 HC RX REV CODE- 250/637: Performed by: NURSE PRACTITIONER

## 2022-03-26 PROCEDURE — 74011250637 HC RX REV CODE- 250/637: Performed by: INTERNAL MEDICINE

## 2022-03-26 PROCEDURE — 65660000000 HC RM CCU STEPDOWN

## 2022-03-26 PROCEDURE — 74011250637 HC RX REV CODE- 250/637: Performed by: SPECIALIST

## 2022-03-26 PROCEDURE — P9047 ALBUMIN (HUMAN), 25%, 50ML: HCPCS | Performed by: FAMILY MEDICINE

## 2022-03-26 PROCEDURE — 99233 SBSQ HOSP IP/OBS HIGH 50: CPT | Performed by: SPECIALIST

## 2022-03-26 PROCEDURE — 74011250637 HC RX REV CODE- 250/637: Performed by: HOSPITALIST

## 2022-03-26 PROCEDURE — 74011250636 HC RX REV CODE- 250/636: Performed by: HOSPITALIST

## 2022-03-26 PROCEDURE — 74011000250 HC RX REV CODE- 250: Performed by: HOSPITALIST

## 2022-03-26 PROCEDURE — 74011250636 HC RX REV CODE- 250/636: Performed by: FAMILY MEDICINE

## 2022-03-26 RX ORDER — LOSARTAN POTASSIUM 50 MG/1
50 TABLET ORAL DAILY
Status: DISCONTINUED | OUTPATIENT
Start: 2022-03-26 | End: 2022-03-28 | Stop reason: HOSPADM

## 2022-03-26 RX ORDER — POTASSIUM CHLORIDE 750 MG/1
40 TABLET, FILM COATED, EXTENDED RELEASE ORAL DAILY
Status: DISCONTINUED | OUTPATIENT
Start: 2022-03-26 | End: 2022-03-28 | Stop reason: HOSPADM

## 2022-03-26 RX ORDER — ATORVASTATIN CALCIUM 20 MG/1
20 TABLET, FILM COATED ORAL
Status: DISCONTINUED | OUTPATIENT
Start: 2022-03-26 | End: 2022-03-28 | Stop reason: HOSPADM

## 2022-03-26 RX ADMIN — SODIUM CHLORIDE, PRESERVATIVE FREE 10 ML: 5 INJECTION INTRAVENOUS at 06:00

## 2022-03-26 RX ADMIN — FLUOXETINE 80 MG: 20 CAPSULE ORAL at 09:04

## 2022-03-26 RX ADMIN — ALBUMIN (HUMAN) 12.5 G: 0.25 INJECTION, SOLUTION INTRAVENOUS at 03:50

## 2022-03-26 RX ADMIN — APIXABAN 5 MG: 2.5 TABLET, FILM COATED ORAL at 15:06

## 2022-03-26 RX ADMIN — POTASSIUM CHLORIDE 40 MEQ: 750 TABLET, EXTENDED RELEASE ORAL at 13:26

## 2022-03-26 RX ADMIN — PANTOPRAZOLE SODIUM 40 MG: 40 TABLET, DELAYED RELEASE ORAL at 09:03

## 2022-03-26 RX ADMIN — APIXABAN 5 MG: 2.5 TABLET, FILM COATED ORAL at 21:21

## 2022-03-26 RX ADMIN — LORAZEPAM 1 MG: 1 TABLET ORAL at 21:21

## 2022-03-26 RX ADMIN — ENOXAPARIN SODIUM 70 MG: 100 INJECTION SUBCUTANEOUS at 03:54

## 2022-03-26 RX ADMIN — LOSARTAN POTASSIUM 50 MG: 50 TABLET, FILM COATED ORAL at 15:06

## 2022-03-26 RX ADMIN — METOPROLOL TARTRATE 50 MG: 25 TABLET, FILM COATED ORAL at 21:21

## 2022-03-26 RX ADMIN — ATORVASTATIN CALCIUM 20 MG: 20 TABLET, FILM COATED ORAL at 21:21

## 2022-03-26 RX ADMIN — FUROSEMIDE 40 MG: 10 INJECTION, SOLUTION INTRAMUSCULAR; INTRAVENOUS at 09:04

## 2022-03-26 RX ADMIN — METOPROLOL TARTRATE 50 MG: 25 TABLET, FILM COATED ORAL at 09:03

## 2022-03-26 RX ADMIN — SODIUM CHLORIDE, PRESERVATIVE FREE 10 ML: 5 INJECTION INTRAVENOUS at 21:22

## 2022-03-26 NOTE — PROGRESS NOTES
Patient alert and oriented, appetite is good, ambulatory with some dyspnea, on 2 liters of oxygen. Problem: General Medical Care Plan  Goal: *Vital signs within specified parameters  Outcome: Progressing Towards Goal  Goal: *Labs within defined limits  Outcome: Progressing Towards Goal  Goal: *Absence of infection signs and symptoms  Outcome: Progressing Towards Goal  Goal: *Optimal pain control at patient's stated goal  Outcome: Progressing Towards Goal  Goal: *Skin integrity maintained  Outcome: Progressing Towards Goal  Goal: *Fluid volume balance  Outcome: Progressing Towards Goal  Goal: *Optimize nutritional status  Outcome: Progressing Towards Goal  Goal: *Anxiety reduced or absent  Outcome: Progressing Towards Goal  Goal: *Progressive mobility and function (eg: ADL's)  Outcome: Progressing Towards Goal     Problem: Patient Education: Go to Patient Education Activity  Goal: Patient/Family Education  Outcome: Progressing Towards Goal     Problem: Falls - Risk of  Goal: *Absence of Falls  Description: Document Riki Fall Risk and appropriate interventions in the flowsheet.   Outcome: Progressing Towards Goal  Note: Fall Risk Interventions:  Mobility Interventions: Communicate number of staff needed for ambulation/transfer         Medication Interventions: Evaluate medications/consider consulting pharmacy,Patient to call before getting OOB,Teach patient to arise slowly    Elimination Interventions: Patient to call for help with toileting needs              Problem: Patient Education: Go to Patient Education Activity  Goal: Patient/Family Education  Outcome: Progressing Towards Goal

## 2022-03-26 NOTE — PROGRESS NOTES
Patient states she is not ready to wean from oxygen, states this is a conversation between her and the MD, not a nurse.

## 2022-03-26 NOTE — PROGRESS NOTES
Bedside shift change report given to [address]  (oncoming nurse) by Taj Loco  (offgoing nurse). Report included the following information SBAR, Kardex, MAR and Recent Results.

## 2022-03-26 NOTE — PROGRESS NOTES
[facility] Adult  Hospitalist Group                                                                                          Hospitalist Progress Note  Arianna Sherman MD  Answering service: [phone] OR [phone] from in house phone        Date of Service:  3/26/2022  NAME:  Kimberly Rucker  :  1945  MRN:  [id number]      Admission Summary:   Kimberly Rucker is a 64 y.o. female who presents with sob      History of hypertension, hyperlipidemia, atrial fibrillation, arthritis, psoriasis, skin cancer, degenerative disc disease, fibromyalgia, GERD, depression/anxiety, thyroid disease.  She presents with complaints of shortness of breath that has been increasing over the last 2 months.  No fever.  She does report achy left-sided chest pain that began today.  It radiates to her back.  She states it is worse with inspiration.  She states that she stopped all of her medicine several months ago to see how things would go without them. Whitney Gaviria has had 2 months of a dry cough.  The dyspnea is worse with exertion.  No lower extremity swelling. . she was evaluated by at the office by her cardilogist Dr. Aleida Pineda and she was referred to ER for further management.      Interval history / Subjective:   Patient resting in bed, no complaint     Assessment & Plan:     Rapid atrial fibrillation  Patient admitted with A. jarek with RVR, currently rate controlled, on oral rate control agents that is lopressor , Lovenox twice daily that can be changed to NOAC on discharge , eliquis most likely     Acute on chronic diastolic congestive heart failure NYHA class III   stress test per cardiology is negative , EF 42 %   , IV diuresis, strict I's and O's, continue to monitor  Will need lasix on discharge     Abdominal distention  CT abdomen pelvis without any acute intra-abdominal pathology, patient symptom resolved, monitor    Hypertension  Labile blood pressure, got albumin , would hold off on more albumin   On 2 L   Will need ambulatory test to assess if O2 needed on discharge or not     Code status: Full  DVT prophylaxis: Lovenox    Care Plan discussed with: Patient/Family  Anticipated Disposition: Home w/Family   Anticipated Discharge: Greater than 48 hours     Hospital Problems  Date Reviewed: 3/24/2022          Codes Class Noted POA    Acute CHF (congestive heart failure) ([facility]) ICD-10-CM: I50.9  ICD-9-CM: 428.0  3/25/2022 Yes        * (Principal) Rapid atrial fibrillation [facility]) ICD-10-CM: I48.91  ICD-9-CM: 427.31  3/24/2022 Yes                Review of Systems:   A comprehensive review of systems was negative except for that written in the HPI. Vital Signs:    Last 24hrs VS reviewed since prior progress note.  Most recent are:  Visit Vitals  /71 (BP 1 Location: Left upper arm, BP Patient Position: Sitting)   Pulse 81   Temp 98.4 °F (36.9 °C)   Resp 18   Ht 5' 6\" (1.676 m)   Wt 69.4 kg (153 lb)   SpO2 96%   BMI 24.69 kg/m²         Intake/Output Summary (Last 24 hours) at 3/26/2022 1306  Last data filed at 3/26/2022 1534  Gross per 24 hour   Intake 360 ml   Output --   Net 360 ml        Physical Examination:     I had a face to face encounter with this patient and independently examined them on 3/26/2022 as outlined below:          General : Alert, awake, no acute distress  HEENT: PEERL, EOMI, moist mucus membrane, TM clear  Neck: supple, no JVD, no meningeal signs  Chest: Clear to auscultation bilaterally   CVS: Irregularly  Abd: soft/ Non tender, non distended, BS physiological,   Ext: no clubbing, no cyanosis, no edema, brisk 2+ DP pulses  Neuro/Psych: No focal neurological deficit  Skin: warm     Data Review:    Review and/or order of clinical lab test      Labs:     Recent Labs     03/25/22  0350 03/24/22  1157   WBC 6.9 7.0   HGB 11.2* 12.3   HCT 35.8 39.6    346     Recent Labs     03/25/22  0350 03/24/22  1157    135*   K 3.2* 3.5    104   CO2 24 24   BUN 12 10   CREA 1.04* 1.08*   GLU 97 131*   CA 8.5 8.8   MG 2.0 2.1   PHOS 4.0  --      Recent Labs     03/25/22  0350 03/24/22  1157   ALT 13 14   AP 75 85   TBILI 1.4* 1.1*   TP 6.6 7.4   ALB 3.4* 3.8   GLOB 3.2 3.6     No results for input(s): INR, PTP, APTT, INREXT, INREXT in the last 72 hours. No results for input(s): FE, TIBC, PSAT, FERR in the last 72 hours. Lab Results   Component Value Date/Time    Folate 3.9 (L) 04/12/2014 04:11 AM      No results for input(s): PH, PCO2, PO2 in the last 72 hours. No results for input(s): CPK, CKNDX, TROIQ in the last 72 hours.     No lab exists for component: CPKMB  Lab Results   Component Value Date/Time    Cholesterol, total 182 02/18/2016 04:05 PM    HDL Cholesterol 78 02/18/2016 04:05 PM    LDL, calculated 83 02/18/2016 04:05 PM    Triglyceride 106 02/18/2016 04:05 PM    CHOL/HDL Ratio 2.4 05/13/2014 04:15 AM     No results found for: [facility]  Lab Results   Component Value Date/Time    Color YELLOW/STRAW 04/14/2015 10:40 AM    Appearance CLEAR 04/14/2015 10:40 AM    Specific gravity 1.015 04/14/2015 10:40 AM    pH (UA) 6.0 04/14/2015 10:40 AM    Protein NEGATIVE  04/14/2015 10:40 AM    Glucose NEGATIVE  04/14/2015 10:40 AM    Ketone NEGATIVE  04/14/2015 10:40 AM    Bilirubin NEGATIVE  04/14/2015 10:40 AM    Urobilinogen 0.2 04/14/2015 10:40 AM    Nitrites NEGATIVE  04/14/2015 10:40 AM    Leukocyte Esterase NEGATIVE  04/14/2015 10:40 AM    Epithelial cells FEW 04/14/2015 10:40 AM    Bacteria NEGATIVE  04/14/2015 10:40 AM    WBC 0-4 04/14/2015 10:40 AM    RBC 0-5 04/14/2015 10:40 AM         Medications Reviewed:     Current Facility-Administered Medications   Medication Dose Route Frequency    saline peripheral flush soln 20 mL  20 mL InterCATHeter PRN    LORazepam (ATIVAN) tablet 1 mg  1 mg Oral QHS PRN    loperamide (IMODIUM) capsule 2 mg  2 mg Oral PRN    metoprolol tartrate (LOPRESSOR) tablet 50 mg  50 mg Oral BID    sodium chloride (NS) flush 5-40 mL  5-40 mL IntraVENous Q8H    sodium chloride (NS) flush 5-40 mL  5-40 mL IntraVENous PRN    acetaminophen (TYLENOL) tablet 650 mg  650 mg Oral Q6H PRN    Or    acetaminophen (TYLENOL) suppository 650 mg  650 mg Rectal Q6H PRN    polyethylene glycol (MIRALAX) packet 17 g  17 g Oral DAILY PRN    ondansetron (ZOFRAN ODT) tablet 4 mg  4 mg Oral Q8H PRN    Or    ondansetron (ZOFRAN) injection 4 mg  4 mg IntraVENous Q6H PRN    furosemide (LASIX) injection 40 mg  40 mg IntraVENous DAILY    enoxaparin (LOVENOX) injection 70 mg  70 mg SubCUTAneous Q12H    pantoprazole (PROTONIX) tablet 40 mg  40 mg Oral ACB    FLUoxetine (PROzac) capsule 80 mg  80 mg Oral DAILY    metoprolol (LOPRESSOR) injection 5 mg  5 mg IntraVENous Q6H PRN     ______________________________________________________________________  EXPECTED LENGTH OF STAY: - - -  ACTUAL LENGTH OF STAY:          2      Please note that this dictation was completed with [employer], the computer voice recognition software. Quite often unanticipated grammatical, syntax, homophones, and other interpretive errors are inadvertently transcribed by the computer software. Please disregard these errors. Please excuse any errors that have escaped final proofreading.                 Jeremy Greenberg MD

## 2022-03-26 NOTE — PROGRESS NOTES
Cardiology Progress Note            Admit Date: 3/24/2022  Admit Diagnosis: Rapid atrial fibrillation [facility]) [I48.91]  Date: 3/26/2022     Time: 12:02 PM  Nuclear stress test showed no ischemia but the EF is 42%  Today she steadily improved feels better. She is up and about in her room lying flat and getting up without any difficulty or shortness of breath. Her heart rate remains controlled in atrial fibrillation. She is not had her echo done yet. She is had no wheezing. She is now off of oxygen. She says she is worried that when she goes home she might need oxygen across the street to the store but was not on oxygen before the hospital stay. Subjective:  64 y.o. female who presented with 2 months of tachycardia and WOLFF. Found to have afib with RVR, likely CHF. HR now controlled on Lopressor. Today, she reports she slept well. Denies chest pain but did have some chest tightness with deep breathing yestserday. SOB is much better aspecially after O2 and nebulizer treatments. Remains in Afib but HR is controlled    Assessment/Plan/Discussion:   1. Atrial fibrillation rapidV  rate  Now in atrial fibrillation with a controlled rate on Lopressor 50 mg twice daily  Nuclear shows no ischemia  Can start [facility]  Lab Results   Component Value Date/Time    TSH 1.02 03/25/2022 03:50 AM      2. Acute on chronic systolic CHF/cardiomyopathy  NYHA III. We do not have an echo but the EF on nuclear is 42% and clinically she had heart failure. Her proBNP was elevated. She now appears to be euvolemic with improved heart rate control and diuresis. She can be switched to p.o. Lasix. I will go ahead and start ARB and and she is on a beta-blocker  Lab Results   Component Value Date/Time    BNP 41 04/03/2014 11:35 AM    NT pro-BNP 8,384 (H) 03/25/2022 03:50 AM    NT pro-BNP 12,153 (H) 03/24/2022 11:57 AM        3.   Mild JOHN creatinine has improved  Lab Results   Component Value Date/Time    Creatinine (POC) 0.9 02/20/2018 03:12 PM    Creatinine 1.04 (H) 03/25/2022 03:50 AM      4. Hypokalemia keep K and mag in normal range given arrhythmia  Lab Results   Component Value Date/Time    Potassium 3.2 (L) 03/25/2022 03:50 AM      5. Hypertension she was unstable but, now off of albumin, prior to hospital stay on HCTZ will hold off on that for now as we add an ARB instead    6. Hypercholesterolemia on statin  Lab Results   Component Value Date/Time    LDL, calculated 83 02/18/2016 04:05 PM      7. GERD on Prilosec      Patient Vitals for the past 12 hrs:   Temp Pulse Resp BP SpO2   03/26/22 1400 -- 92 -- -- --   03/26/22 1252 98.4 °F (36.9 °C) 81 18 110/71 96 %   03/26/22 1200 -- 84 -- -- --   03/26/22 1000 -- 93 -- -- --   03/26/22 0825 98.6 °F (37 °C) 81 18 104/65 93 %   03/26/22 0800 -- 92 -- -- --   03/26/22 0600 -- 88 -- -- --   03/26/22 0440 98.3 °F (36.8 °C) 83 18 112/72 94 %   03/26/22 0405 -- 83 -- -- --   03/26/22 0400 -- 91 -- -- --   03/26/22 0350 98.6 °F (37 °C) 88 16 98/62 95 %      03/24/22    NUCLEAR CARDIAC STRESS TEST 03/25/2022 3/25/2022    Interpretation Summary    ECG: Resting ECG demonstrates atrial fibrillation.   Stress Test: A pharmacological stress test was performed using lexiscan. The patient reported no chest pain during the stress test.    INDICATION: Atrial fibrillation. History of hypertension    COMPARISON:  None. CORRELATIVE IMAGING STUDIES:  None    TRACER: Tc 99m Sestamibi    TECHNIQUE:  Resting SPECT images of the heart were obtained following the uneventful intravenous administration of 10.3 mCi of Tc 99m Sestamibi. Gated stress SPECT images of the heart were obtained following Lexiscan protocol and the uneventful intravenous administration of 32.5 mCi of Tc 99m Sestamibi. FINDINGS:  The rest and stress perfusion images demonstrate no significant perfusion defect or evidence of myocardial reversibility.     The gated images demonstrate global hypokinesia. Left ventricular ejection fraction is 42 %. Impression:  No evidence of myocardial ischemia or infarction. Left ventricular ejection fraction is 42 %. Global hypokinesia.     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    Cerebellar ataxia (HCC)     no longer has    Chronic pain     DDD/fibromyalgia    Coagulation disorder ([facility]     free bleeder - no diagnosis    GERD (gastroesophageal reflux disease)     History of degenerative disc disease     Hypercholesterolemia     Lexiscan 3-13-18 WNL EF 80% [facility]    Hypertension     Liver disease     fatty liver    Orthostasis 2014    Other and unspecified symptoms and signs involving general sensations and perceptions     previous hx \"cerebellar ataxia\"    Other ill-defined conditions(799.89)     fibromyalgia    Psychiatric disorder     depression/anxiety    Thyroid disease       Social Hx  Social History     Socioeconomic History    Marital status:      Spouse name: Not on file    Number of children: Not on file    Years of education: Not on file    Highest education level: Not on file   Occupational History    Not on file   Tobacco Use    Smoking status: Former Smoker     Quit date: 2008     Years since quittin.1    Smokeless tobacco: Never Used    Tobacco comment: quit smoking cigarettes   Substance and Sexual Activity    Alcohol use: No    Drug use: No    Sexual activity: Not on file   Other Topics Concern    Not on file   Social History Narrative    ** Merged History Encounter **          Social Determinants of Health     Financial Resource Strain:     Difficulty of Paying Living Expenses: Not on file   Food Insecurity:     Worried About [address] in the Last Year: Not on file    Mitch of Food in the Last Year: Not on file   Transportation Needs:     Lack of Transportation (Medical): Not on file    Lack of Transportation (Non-Medical): Not on file   Physical Activity:     Days of Exercise per Week: Not on file    Minutes of Exercise per Session: Not on file   Stress:     Feeling of Stress : Not on file   Social Connections:     Frequency of Communication with Friends and Family: Not on file    Frequency of Social Gatherings with Friends and Family: Not on file    Attends L[employer] day Services: Not on file    Active Member of [address] [place] [address] or Organizations: Not on file    Attends Club or Organization Meetings: Not on file    Marital Status: Not on file   Intimate Partner Violence:     Fear of Current or Ex-Partner: Not on file    Emotionally Abused: Not on file    Physically Abused: Not on file    Sexually Abused: Not on file   Housing Stability:     Unable to Pay for Housing in the Last Year: Not on file    Number of [place] in the Last Year: Not on file    Unstable Housing in the Last Year: Not on file       ROS:  Pertinent items are noted in HPI. Objective:      Physical Exam:                Visit Vitals  /71 (BP 1 Location: Left upper arm, BP Patient Position: Sitting)   Pulse 92   Temp 98.4 °F (36.9 °C)   Resp 18   Ht 5' 6\" (1.676 m)   Wt 153 lb (69.4 kg)   SpO2 96%   BMI 24.69 kg/m²          General Appearance:   Well developed, well nourished,alert and oriented x 3, and   individual in no acute distress. Ears/Nose/Mouth/Throat:    Hearing grossly normal.         Neck:  Supple. Chest:    Lungs to auscultation bilaterally. Cardiovascular:   Irregularly irregular rate and rhythm, S1, S2 normal, no murmur. Abdomen:    Soft, non-tender, bowel sounds are active. Extremities:  No edema bilaterally. Skin:  Warm and dry.      Telemetry: afib, rate controlled          Data Review:    Labs:    No results found for this or any previous visit (from the past 24 hour(s)).        Radiology:        Current Facility-Administered Medications   Medication Dose Route Frequency    potassium chloride SR (KLOR-CON 10) tablet 40 mEq  40 mEq Oral DAILY    saline peripheral flush soln 20 mL  20 mL InterCATHeter PRN    LORazepam (ATIVAN) tablet 1 mg  1 mg Oral QHS PRN    loperamide (IMODIUM) capsule 2 mg  2 mg Oral PRN    metoprolol tartrate (LOPRESSOR) tablet 50 mg  50 mg Oral BID    sodium chloride (NS) flush 5-40 mL  5-40 mL IntraVENous Q8H    sodium chloride (NS) flush 5-40 mL  5-40 mL IntraVENous PRN    acetaminophen (TYLENOL) tablet 650 mg  650 mg Oral Q6H PRN    Or    acetaminophen (TYLENOL) suppository 650 mg  650 mg Rectal Q6H PRN    polyethylene glycol (MIRALAX) packet 17 g  17 g Oral DAILY PRN    ondansetron (ZOFRAN ODT) tablet 4 mg  4 mg Oral Q8H PRN    Or    ondansetron (ZOFRAN) injection 4 mg  4 mg IntraVENous Q6H PRN    furosemide (LASIX) injection 40 mg  40 mg IntraVENous DAILY    enoxaparin (LOVENOX) injection 70 mg  70 mg SubCUTAneous Q12H    pantoprazole (PROTONIX) tablet 40 mg  40 mg Oral ACB    FLUoxetine (PROzac) capsule 80 mg  80 mg Oral DAILY    metoprolol (LOPRESSOR) injection 5 mg  5 mg IntraVENous Q6H PRN          Radha Garcia MD     [facility] [address] [place] [address] 13, [address] [address] [place] [address]   [phone]

## 2022-03-27 ENCOUNTER — APPOINTMENT (OUTPATIENT)
Dept: NON INVASIVE DIAGNOSTICS | Age: 77
DRG: 291 | End: 2022-03-27
Attending: SPECIALIST
Payer: MEDICARE

## 2022-03-27 LAB
ANION GAP SERPL CALC-SCNC: 7 MMOL/L (ref 5–15)
BUN SERPL-MCNC: 17 MG/DL (ref 6–20)
BUN/CREAT SERPL: 15 (ref 12–20)
CALCIUM SERPL-MCNC: 8.3 MG/DL (ref 8.5–10.1)
CALCULATED R AXIS, ECG10: -13 DEGREES
CALCULATED T AXIS, ECG11: 82 DEGREES
CHLORIDE SERPL-SCNC: 103 MMOL/L (ref 97–108)
CO2 SERPL-SCNC: 27 MMOL/L (ref 21–32)
CREAT SERPL-MCNC: 1.13 MG/DL (ref 0.55–1.02)
DIAGNOSIS, 93000: NORMAL
ECHO AO ROOT DIAM: 3.5 CM
ECHO AO ROOT INDEX: 2.01 CM/M2
ECHO AV PEAK GRADIENT: 6 MMHG
ECHO AV PEAK VELOCITY: 1.2 M/S
ECHO AV VELOCITY RATIO: 0.75
ECHO EST RA PRESSURE: 3 MMHG
ECHO LA DIAMETER INDEX: 1.9 CM/M2
ECHO LA DIAMETER: 3.3 CM
ECHO LA TO AORTIC ROOT RATIO: 0.94
ECHO LV E' LATERAL VELOCITY: 8 CM/S
ECHO LV E' SEPTAL VELOCITY: 3 CM/S
ECHO LV EDV A2C: 51 ML
ECHO LV EDV A4C: 43 ML
ECHO LV EDV BP: 49 ML (ref 56–104)
ECHO LV EDV INDEX A4C: 25 ML/M2
ECHO LV EDV INDEX BP: 28 ML/M2
ECHO LV EDV NDEX A2C: 29 ML/M2
ECHO LV EJECTION FRACTION A2C: 52 %
ECHO LV EJECTION FRACTION A4C: 25 %
ECHO LV EJECTION FRACTION BIPLANE: 41 % (ref 55–100)
ECHO LV ESV A2C: 24 ML
ECHO LV ESV A4C: 32 ML
ECHO LV ESV BP: 29 ML (ref 19–49)
ECHO LV ESV INDEX A2C: 14 ML/M2
ECHO LV ESV INDEX A4C: 18 ML/M2
ECHO LV ESV INDEX BP: 17 ML/M2
ECHO LV FRACTIONAL SHORTENING: 20 % (ref 28–44)
ECHO LV INTERNAL DIMENSION DIASTOLE INDEX: 2.82 CM/M2
ECHO LV INTERNAL DIMENSION DIASTOLIC: 4.9 CM (ref 3.9–5.3)
ECHO LV INTERNAL DIMENSION SYSTOLIC INDEX: 2.24 CM/M2
ECHO LV INTERNAL DIMENSION SYSTOLIC: 3.9 CM
ECHO LV IVSD: 0.9 CM (ref 0.6–0.9)
ECHO LV MASS 2D: 176 G (ref 67–162)
ECHO LV MASS INDEX 2D: 101.2 G/M2 (ref 43–95)
ECHO LV POSTERIOR WALL DIASTOLIC: 1.1 CM (ref 0.6–0.9)
ECHO LV RELATIVE WALL THICKNESS RATIO: 0.45
ECHO LVOT PEAK GRADIENT: 3 MMHG
ECHO LVOT PEAK VELOCITY: 0.9 M/S
ECHO MV E VELOCITY: 0.88 M/S
ECHO MV E/E' LATERAL: 11
ECHO MV E/E' RATIO (AVERAGED): 20.17
ECHO MV E/E' SEPTAL: 29.33
ECHO PV MAX VELOCITY: 0.9 M/S
ECHO PV PEAK GRADIENT: 3 MMHG
ECHO RIGHT VENTRICULAR SYSTOLIC PRESSURE (RVSP): 27 MMHG
ECHO RV FREE WALL PEAK S': 8 CM/S
ECHO RV TAPSE: 1.3 CM (ref 1.5–2)
ECHO TV REGURGITANT MAX VELOCITY: 2.47 M/S
ECHO TV REGURGITANT PEAK GRADIENT: 24 MMHG
GLUCOSE SERPL-MCNC: 96 MG/DL (ref 65–100)
POTASSIUM SERPL-SCNC: 3.4 MMOL/L (ref 3.5–5.1)
Q-T INTERVAL, ECG07: 382 MS
QRS DURATION, ECG06: 90 MS
QTC CALCULATION (BEZET), ECG08: 521 MS
SODIUM SERPL-SCNC: 137 MMOL/L (ref 136–145)
STRESS BASELINE DIAS BP: 60 MMHG
STRESS BASELINE HR: 90 BPM
STRESS BASELINE SYS BP: 94 MMHG
STRESS ESTIMATED WORKLOAD: 1 METS
STRESS EXERCISE DUR MIN: 3 MIN
STRESS EXERCISE DUR SEC: 0 SEC
STRESS PEAK DIAS BP: 80 MMHG
STRESS PEAK SYS BP: 119 MMHG
STRESS PERCENT HR ACHIEVED: 67 %
STRESS POST PEAK HR: 95 BPM
STRESS RATE PRESSURE PRODUCT: NORMAL BPM*MMHG
STRESS STAGE 1 BP: NORMAL MMHG
STRESS STAGE 1 DURATION: 1 MIN:SEC
STRESS STAGE 1 HR: 84 BPM
STRESS STAGE 2 DURATION: 1 MIN:SEC
STRESS STAGE 2 HR: 91 BPM
STRESS STAGE 3 BP: NORMAL MMHG
STRESS STAGE 3 DURATION: 1 MIN:SEC
STRESS STAGE 3 HR: 87 BPM
STRESS STAGE RECOVERY 1 BP: NORMAL MMHG
STRESS STAGE RECOVERY 1 DURATION: 1 MIN:SEC
STRESS STAGE RECOVERY 1 HR: 95 BPM
STRESS STAGE RECOVERY 2 DURATION: 1 MIN:SEC
STRESS STAGE RECOVERY 2 HR: 93 BPM
STRESS STAGE RECOVERY 3 BP: NORMAL MMHG
STRESS STAGE RECOVERY 3 DURATION: 1 MIN:SEC
STRESS STAGE RECOVERY 3 HR: 92 BPM
STRESS TARGET HR: 142 BPM
VENTRICULAR RATE, ECG03: 112 BPM

## 2022-03-27 PROCEDURE — 74011250637 HC RX REV CODE- 250/637: Performed by: SPECIALIST

## 2022-03-27 PROCEDURE — 74011000250 HC RX REV CODE- 250: Performed by: HOSPITALIST

## 2022-03-27 PROCEDURE — 74011250637 HC RX REV CODE- 250/637: Performed by: INTERNAL MEDICINE

## 2022-03-27 PROCEDURE — 74011250637 HC RX REV CODE- 250/637: Performed by: NURSE PRACTITIONER

## 2022-03-27 PROCEDURE — 93306 TTE W/DOPPLER COMPLETE: CPT | Performed by: SPECIALIST

## 2022-03-27 PROCEDURE — 65660000000 HC RM CCU STEPDOWN

## 2022-03-27 PROCEDURE — 74011250637 HC RX REV CODE- 250/637: Performed by: HOSPITALIST

## 2022-03-27 PROCEDURE — 99233 SBSQ HOSP IP/OBS HIGH 50: CPT | Performed by: SPECIALIST

## 2022-03-27 PROCEDURE — 93306 TTE W/DOPPLER COMPLETE: CPT

## 2022-03-27 PROCEDURE — 36415 COLL VENOUS BLD VENIPUNCTURE: CPT

## 2022-03-27 PROCEDURE — 80048 BASIC METABOLIC PNL TOTAL CA: CPT

## 2022-03-27 RX ORDER — AMIODARONE HYDROCHLORIDE 200 MG/1
200 TABLET ORAL DAILY
Status: DISCONTINUED | OUTPATIENT
Start: 2022-03-28 | End: 2022-03-27

## 2022-03-27 RX ORDER — AMIODARONE HYDROCHLORIDE 200 MG/1
400 TABLET ORAL 2 TIMES DAILY
Status: DISCONTINUED | OUTPATIENT
Start: 2022-03-27 | End: 2022-03-28 | Stop reason: HOSPADM

## 2022-03-27 RX ORDER — CARVEDILOL 12.5 MG/1
6.25 TABLET ORAL 2 TIMES DAILY WITH MEALS
Status: DISCONTINUED | OUTPATIENT
Start: 2022-03-27 | End: 2022-03-28 | Stop reason: HOSPADM

## 2022-03-27 RX ADMIN — ATORVASTATIN CALCIUM 20 MG: 20 TABLET, FILM COATED ORAL at 20:52

## 2022-03-27 RX ADMIN — APIXABAN 5 MG: 2.5 TABLET, FILM COATED ORAL at 09:24

## 2022-03-27 RX ADMIN — SODIUM CHLORIDE, PRESERVATIVE FREE 10 ML: 5 INJECTION INTRAVENOUS at 06:52

## 2022-03-27 RX ADMIN — POTASSIUM CHLORIDE 40 MEQ: 750 TABLET, EXTENDED RELEASE ORAL at 09:24

## 2022-03-27 RX ADMIN — AMIODARONE HYDROCHLORIDE 400 MG: 200 TABLET ORAL at 22:29

## 2022-03-27 RX ADMIN — CARVEDILOL 6.25 MG: 12.5 TABLET, FILM COATED ORAL at 22:29

## 2022-03-27 RX ADMIN — SODIUM CHLORIDE, PRESERVATIVE FREE 10 ML: 5 INJECTION INTRAVENOUS at 20:56

## 2022-03-27 RX ADMIN — LORAZEPAM 1 MG: 1 TABLET ORAL at 20:52

## 2022-03-27 RX ADMIN — APIXABAN 5 MG: 2.5 TABLET, FILM COATED ORAL at 20:52

## 2022-03-27 RX ADMIN — FLUOXETINE 80 MG: 20 CAPSULE ORAL at 09:24

## 2022-03-27 RX ADMIN — PANTOPRAZOLE SODIUM 40 MG: 40 TABLET, DELAYED RELEASE ORAL at 06:52

## 2022-03-27 NOTE — PROGRESS NOTES
Patient resting in bed, up ad martha, no complaints of pain, on room air. Problem: General Medical Care Plan  Goal: *Vital signs within specified parameters  Outcome: Progressing Towards Goal  Goal: *Labs within defined limits  Outcome: Progressing Towards Goal  Goal: *Absence of infection signs and symptoms  Outcome: Progressing Towards Goal  Goal: *Optimal pain control at patient's stated goal  Outcome: Progressing Towards Goal  Goal: *Skin integrity maintained  Outcome: Progressing Towards Goal  Goal: *Fluid volume balance  Outcome: Progressing Towards Goal  Goal: *Optimize nutritional status  Outcome: Progressing Towards Goal  Goal: *Anxiety reduced or absent  Outcome: Progressing Towards Goal  Goal: *Progressive mobility and function (eg: ADL's)  Outcome: Progressing Towards Goal     Problem: Patient Education: Go to Patient Education Activity  Goal: Patient/Family Education  Outcome: Progressing Towards Goal     Problem: Falls - Risk of  Goal: *Absence of Falls  Description: Document Riki Fall Risk and appropriate interventions in the flowsheet.   Outcome: Progressing Towards Goal  Note: Fall Risk Interventions:  Mobility Interventions: Communicate number of staff needed for ambulation/transfer         Medication Interventions: Evaluate medications/consider consulting pharmacy,Patient to call before getting OOB,Teach patient to arise slowly    Elimination Interventions: Patient to call for help with toileting needs              Problem: Patient Education: Go to Patient Education Activity  Goal: Patient/Family Education  Outcome: Progressing Towards Goal

## 2022-03-27 NOTE — PROGRESS NOTES
[facility]  Hospitalist Group                                                                                          Hospitalist Progress Note  Kendra Koenig MD  Answering service: [phone] or [phone] from in house phone        Date of Service:  3/27/2022  NAME:  Wilfredo Melchor  :  1945  MRN:  [id number]      Admission Summary:   Wilfredo Melchor is a 64 y.o. female who presents with sob      History of hypertension, hyperlipidemia, atrial fibrillation, arthritis, psoriasis, skin cancer, degenerative disc disease, fibromyalgia, GERD, depression/anxiety, thyroid disease.  She presents with complaints of shortness of breath that has been increasing over the last 2 months.  No fever.  She does report achy left-sided chest pain that began today.  It radiates to her back.  She states it is worse with inspiration.  She states that she stopped all of her medicine several months ago to see how things would go without them. Gato Connelly has had 2 months of a dry cough.  The dyspnea is worse with exertion.  No lower extremity swelling. . she was evaluated by at the office by her cardilogist Dr. Marco A Jung and she was referred to ER for further management.      Interval history / Subjective:     Patient resting in bed, no complaint     Assessment & Plan:     Rapid atrial fibrillation  Patient admitted with PRAVEEN tilley with RVR, currently rate controlled, on oral rate control agents that is lopressor , an on eliquis       Acute on chronic diastolic congestive heart failure NYHA class III   stress test per cardiology is negative , EF 42 %   , IV diuresis, strict I's and O's, continue to monitor  Will need lasix on discharge     Abdominal distention  CT abdomen pelvis without any acute intra-abdominal pathology, patient symptom resolved, monitor    Hypertension  Labile blood pressure, got albumin , would hold off on more albumin   On 2 L   Will need ambulatory test to assess if O2 needed on discharge or not Hypotension   Hypotension this am and all the blood pressure medications including losartan and metoprolol have been held. Lasix has been held this morning. We will watch her blood pressure another day and if it is stabilized we might decrease her dose of losartan and metoprolol and give very low-dose of Lasix on discharge home tomorrow    Code status: Full  DVT prophylaxis: Lovenox    Care Plan discussed with: Patient/Family  Anticipated Disposition: Home w/Family   Anticipated Discharge: Greater than 48 hours     Hospital Problems  Date Reviewed: 3/24/2022          Codes Class Noted POA    Primary hypertension ICD-10-CM: I10  ICD-9-CM: 401.9  3/26/2022 Yes        Acute CHF (congestive heart failure) (HCC) ICD-10-CM: I50.9  ICD-9-CM: 428.0  3/25/2022 Yes        * (Principal) Rapid atrial fibrillation ([facility] [address] ICD-10-CM: I48.91  ICD-9-CM: 427.31  3/24/2022 Yes                Review of Systems:   A comprehensive review of systems was negative except for that written in the HPI. Vital Signs:    Last 24hrs VS reviewed since prior progress note.  Most recent are:  Visit Vitals  /74 (BP 1 Location: Left upper arm, BP Patient Position: Sitting)   Pulse 93   Temp 98.7 °F (37.1 °C)   Resp 18   Ht 5' 6\" (1.676 m)   Wt 65 kg (143 lb 4.8 oz)   SpO2 95%   BMI 23.13 kg/m²         Intake/Output Summary (Last 24 hours) at 3/27/2022 1509  Last data filed at 3/27/2022 2575  Gross per 24 hour   Intake 240 ml   Output --   Net 240 ml        Physical Examination:     I had a face to face encounter with this patient and independently examined them on 3/27/2022 as outlined below:          General : Alert, awake, no acute distress  HEENT: PEERL, EOMI, moist mucus membrane, TM clear  Neck: supple, no JVD, no meningeal signs  Chest: Clear to auscultation bilaterally   CVS: Irregularly  Abd: soft/ Non tender, non distended, BS physiological,   Ext: no clubbing, no cyanosis, no edema, brisk 2+ DP pulses  Neuro/Psych: No focal neurological deficit  Skin: warm     Data Review:    Review and/or order of clinical lab test      Labs:     Recent Labs     03/25/22  0350   WBC 6.9   HGB 11.2*   HCT 35.8        Recent Labs     03/27/22  0617 03/25/22  0350    136   K 3.4* 3.2*    105   CO2 27 24   BUN 17 12   CREA 1.13* 1.04*   GLU 96 97   CA 8.3* 8.5   MG  --  2.0   PHOS  --  4.0     Recent Labs     03/25/22  0350   ALT 13   AP 75   TBILI 1.4*   TP 6.6   ALB 3.4*   GLOB 3.2     No results for input(s): INR, PTP, APTT, INREXT, INREXT in the last 72 hours. No results for input(s): FE, TIBC, PSAT, FERR in the last 72 hours. Lab Results   Component Value Date/Time    Folate 3.9 (L) 04/12/2014 04:11 AM      No results for input(s): PH, PCO2, PO2 in the last 72 hours. No results for input(s): CPK, CKNDX, TROIQ in the last 72 hours.     No lab exists for component: CPKMB  Lab Results   Component Value Date/Time    Cholesterol, total 182 02/18/2016 04:05 PM    HDL Cholesterol 78 02/18/2016 04:05 PM    LDL, calculated 83 02/18/2016 04:05 PM    Triglyceride 106 02/18/2016 04:05 PM    CHOL/HDL Ratio 2.4 05/13/2014 04:15 AM     No results found for: [facility]  Lab Results   Component Value Date/Time    Color YELLOW/STRAW 04/14/2015 10:40 AM    Appearance CLEAR 04/14/2015 10:40 AM    Specific gravity 1.015 04/14/2015 10:40 AM    pH (UA) 6.0 04/14/2015 10:40 AM    Protein NEGATIVE  04/14/2015 10:40 AM    Glucose NEGATIVE  04/14/2015 10:40 AM    Ketone NEGATIVE  04/14/2015 10:40 AM    Bilirubin NEGATIVE  04/14/2015 10:40 AM    Urobilinogen 0.2 04/14/2015 10:40 AM    Nitrites NEGATIVE  04/14/2015 10:40 AM    Leukocyte Esterase NEGATIVE  04/14/2015 10:40 AM    Epithelial cells FEW 04/14/2015 10:40 AM    Bacteria NEGATIVE  04/14/2015 10:40 AM    WBC 0-4 04/14/2015 10:40 AM    RBC 0-5 04/14/2015 10:40 AM         Medications Reviewed:     Current Facility-Administered Medications   Medication Dose Route Frequency    potassium chloride SR (KLOR-CON 10) tablet 40 mEq  40 mEq Oral DAILY    atorvastatin (LIPITOR) tablet 20 mg  20 mg Oral QHS    [Held by provider] losartan (COZAAR) tablet 50 mg  50 mg Oral DAILY    apixaban (ELIQUIS) tablet 5 mg  5 mg Oral Q12H    saline peripheral flush soln 20 mL  20 mL InterCATHeter PRN    LORazepam (ATIVAN) tablet 1 mg  1 mg Oral QHS PRN    loperamide (IMODIUM) capsule 2 mg  2 mg Oral PRN    [Held by provider] metoprolol tartrate (LOPRESSOR) tablet 50 mg  50 mg Oral BID    sodium chloride (NS) flush 5-40 mL  5-40 mL IntraVENous Q8H    sodium chloride (NS) flush 5-40 mL  5-40 mL IntraVENous PRN    acetaminophen (TYLENOL) tablet 650 mg  650 mg Oral Q6H PRN    Or    acetaminophen (TYLENOL) suppository 650 mg  650 mg Rectal Q6H PRN    polyethylene glycol (MIRALAX) packet 17 g  17 g Oral DAILY PRN    ondansetron (ZOFRAN ODT) tablet 4 mg  4 mg Oral Q8H PRN    Or    ondansetron (ZOFRAN) injection 4 mg  4 mg IntraVENous Q6H PRN    pantoprazole (PROTONIX) tablet 40 mg  40 mg Oral ACB    FLUoxetine (PROzac) capsule 80 mg  80 mg Oral DAILY    metoprolol (LOPRESSOR) injection 5 mg  5 mg IntraVENous Q6H PRN     ______________________________________________________________________  EXPECTED LENGTH OF STAY: - - -  ACTUAL LENGTH OF STAY:          3      Please note that this dictation was completed with Hoonto, the computer voice recognition software. Quite often unanticipated grammatical, syntax, homophones, and other interpretive errors are inadvertently transcribed by the computer software. Please disregard these errors. Please excuse any errors that have escaped final proofreading.                 Amauri Mcgraw MD

## 2022-03-27 NOTE — PROGRESS NOTES
Bedside shift change report given to [address]  (oncoming nurse) by Satish Kc RN  (offgoing nurse). Report included the following information SBAR, Kardex, MAR and Recent Results.

## 2022-03-28 ENCOUNTER — TELEPHONE (OUTPATIENT)
Dept: CARDIOLOGY CLINIC | Age: 77
End: 2022-03-28

## 2022-03-28 VITALS
BODY MASS INDEX: 23.03 KG/M2 | HEART RATE: 79 BPM | WEIGHT: 143.3 LBS | TEMPERATURE: 97.8 F | RESPIRATION RATE: 18 BRPM | SYSTOLIC BLOOD PRESSURE: 117 MMHG | HEIGHT: 66 IN | OXYGEN SATURATION: 94 % | DIASTOLIC BLOOD PRESSURE: 61 MMHG

## 2022-03-28 DIAGNOSIS — I48.0 PAROXYSMAL ATRIAL FIBRILLATION (HCC): ICD-10-CM

## 2022-03-28 DIAGNOSIS — R94.31 ABNORMAL EKG: Primary | ICD-10-CM

## 2022-03-28 PROBLEM — I48.91 RAPID ATRIAL FIBRILLATION (HCC): Status: RESOLVED | Noted: 2022-03-24 | Resolved: 2022-03-28

## 2022-03-28 PROBLEM — I50.9 ACUTE CHF (CONGESTIVE HEART FAILURE) (HCC): Status: RESOLVED | Noted: 2022-03-25 | Resolved: 2022-03-28

## 2022-03-28 LAB
ANION GAP SERPL CALC-SCNC: 7 MMOL/L (ref 5–15)
BUN SERPL-MCNC: 19 MG/DL (ref 6–20)
BUN/CREAT SERPL: 18 (ref 12–20)
CALCIUM SERPL-MCNC: 8.5 MG/DL (ref 8.5–10.1)
CHLORIDE SERPL-SCNC: 105 MMOL/L (ref 97–108)
CO2 SERPL-SCNC: 22 MMOL/L (ref 21–32)
CREAT SERPL-MCNC: 1.04 MG/DL (ref 0.55–1.02)
GLUCOSE SERPL-MCNC: 110 MG/DL (ref 65–100)
POTASSIUM SERPL-SCNC: 4.4 MMOL/L (ref 3.5–5.1)
SODIUM SERPL-SCNC: 134 MMOL/L (ref 136–145)

## 2022-03-28 PROCEDURE — 74011250637 HC RX REV CODE- 250/637: Performed by: INTERNAL MEDICINE

## 2022-03-28 PROCEDURE — 36415 COLL VENOUS BLD VENIPUNCTURE: CPT

## 2022-03-28 PROCEDURE — 74011250637 HC RX REV CODE- 250/637: Performed by: HOSPITALIST

## 2022-03-28 PROCEDURE — 80048 BASIC METABOLIC PNL TOTAL CA: CPT

## 2022-03-28 PROCEDURE — 99232 SBSQ HOSP IP/OBS MODERATE 35: CPT | Performed by: INTERNAL MEDICINE

## 2022-03-28 PROCEDURE — 74011000250 HC RX REV CODE- 250: Performed by: HOSPITALIST

## 2022-03-28 PROCEDURE — 74011250637 HC RX REV CODE- 250/637: Performed by: SPECIALIST

## 2022-03-28 RX ORDER — AMIODARONE HYDROCHLORIDE 400 MG/1
400 TABLET ORAL 2 TIMES DAILY
Qty: 30 TABLET | Refills: 0 | Status: SHIPPED | OUTPATIENT
Start: 2022-03-28 | End: 2022-03-30

## 2022-03-28 RX ORDER — CARVEDILOL 6.25 MG/1
6.25 TABLET ORAL 2 TIMES DAILY WITH MEALS
Qty: 30 TABLET | Refills: 1 | Status: SHIPPED | OUTPATIENT
Start: 2022-03-28 | End: 2022-04-29 | Stop reason: SDUPTHER

## 2022-03-28 RX ORDER — LOSARTAN POTASSIUM 50 MG/1
25 TABLET ORAL DAILY
Qty: 30 TABLET | Refills: 0 | Status: SHIPPED | OUTPATIENT
Start: 2022-03-29 | End: 2022-04-27 | Stop reason: SDUPTHER

## 2022-03-28 RX ADMIN — POTASSIUM CHLORIDE 40 MEQ: 750 TABLET, EXTENDED RELEASE ORAL at 09:30

## 2022-03-28 RX ADMIN — FLUOXETINE 80 MG: 20 CAPSULE ORAL at 09:31

## 2022-03-28 RX ADMIN — SODIUM CHLORIDE, PRESERVATIVE FREE 10 ML: 5 INJECTION INTRAVENOUS at 06:39

## 2022-03-28 RX ADMIN — AMIODARONE HYDROCHLORIDE 400 MG: 200 TABLET ORAL at 09:31

## 2022-03-28 RX ADMIN — APIXABAN 5 MG: 2.5 TABLET, FILM COATED ORAL at 09:31

## 2022-03-28 RX ADMIN — CARVEDILOL 6.25 MG: 12.5 TABLET, FILM COATED ORAL at 06:41

## 2022-03-28 RX ADMIN — PANTOPRAZOLE SODIUM 40 MG: 40 TABLET, DELAYED RELEASE ORAL at 06:39

## 2022-03-28 RX ADMIN — LOSARTAN POTASSIUM 50 MG: 50 TABLET, FILM COATED ORAL at 09:31

## 2022-03-28 NOTE — PROGRESS NOTES
Cardiology Progress Note            Admit Date: 3/24/2022  Admit Diagnosis: Rapid atrial fibrillation [facility]) [I48.91]  Date: 3/27/2022      Vinny, son at bedside. Patient had no oxygen on and was able to walk. She did feel little short of breath but her oxygen sats stayed normal.  She did not recall the echo and was wondering what would be done. Was later able to find of the echo had already been done not able to yet tell her the results of her EF is similar to echo as it was the nuclear. She denies any chest pain shortness of breath at rest or edema. She has WOLFF still. Her Coreg and ARB has been held and not certain as to why this morning her blood pressure is 90 over 60s. Nuclear stress test showed no ischemia but the EF is 42%  03/24/22    ECHO ADULT COMPLETE 03/27/2022 3/27/2022    Interpretation Summary    Left Ventricle: Left ventricle size is normal. Normal wall thickness. Mild global hypokinesis present. Mildly reduced left ventricular systolic function. EF by 2D [employer]ons Biplane is 41%.   Right Ventricle: Moderately reduced systolic function. TAPSE is abnormal. TAPSE is 1.3 cm.   Tricuspid Valve: Moderate transvalvular regurgitation. Signed by: Pily Herrera MD on 3/27/2022  9:45 PM      Prev HPI  64 y.o. female who presented with 2 months of tachycardia and WOLFF. Found to have afib with RVR, likely CHF. HR now controlled on Lopressor. Today, she reports she slept well. Denies chest pain but did have some chest tightness with deep breathing yestserday. SOB is much better aspecially after O2 and nebulizer treatments. Remains in Afib but HR is controlled    Assessment/Plan/Discussion: sees Dr Mandy Yuen in office in 2018  No future appointments. Restart ARB and use Coreg, if stable and tolerate meds, dc in pm tomorrow and fu with  me in 3 weeks   1.   Atrial fibrillation rapidV  Rate  Now in atrial fibrillation with a controlled rate on Lopressor 50 mg twice daily  Nuclear shows no ischemia  Can start [address] now on Eliquis 5 mg twice daily. Start amiodaorne with plans for eventual CV   Lab Results   Component Value Date/Time    TSH 1.02 03/25/2022 03:50 AM      2. 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 She should continue her CHF meds even if her blood pressures in the 90s long as she is mentating well. Often blood pressure will be lower in heart failure patients this does not require stopping the med unless they become too low. Echo shows an EF of 41% similar to the nuclear. There is no ischemia and no wall motion normality so I think she can be managed medically. Her BNP was 12,000 and is improved 8000. NYHA III. EF on nuclear is 42% and clinically she had heart failure. Her proBNP was elevated. She now appears to be euvolemic with improved heart rate control and diuresis. She can be switched to p.o. Lasix. I will go ahead and start ARB and and she is on a beta-blocker  Lab Results   Component Value Date/Time    BNP 41 04/03/2014 11:35 AM    NT pro-BNP 8,384 (H) 03/25/2022 03:50 AM    NT pro-BNP 12,153 (H) 03/24/2022 11:57 AM        3. Mild JOHN creatinine has improved  Lab Results   Component Value Date/Time    Creatinine (POC) 0.9 02/20/2018 03:12 PM    Creatinine 1.13 (H) 03/27/2022 06:17 AM      4. Hypokalemia keep K and mag in normal range given arrhythmia  Lab Results   Component Value Date/Time    Potassium 3.4 (L) 03/27/2022 06:17 AM      5. Hypertension she was unstable but, now off of albumin, prior to hospital stay on HCTZ will hold off on that for now as we add an ARB instead    6. Hypercholesterolemia on statin  Lab Results   Component Value Date/Time    LDL, calculated 83 02/18/2016 04:05 PM      7.   GERD on Prilosec      Patient Vitals for the past 12 hrs:   Temp Pulse Resp BP SpO2   03/27/22 1924 98.1 °F (36.7 °C) (!) 101 18 (!) 144/82 95 %   03/27/22 1800 -- (!) 103 -- -- --   03/27/22 1600 -- 90 -- -- --   03/27/22 1527 97.4 °F (36.3 °C) 94 18 123/76 96 %   03/27/22 1400 -- 93 -- -- --   03/27/22 1200 -- 89 -- -- --   03/27/22 1152 98.7 °F (37.1 °C) 92 18 117/74 95 %   03/27/22 1000 -- (!) 104 -- -- --      03/24/22    NUCLEAR CARDIAC STRESS TEST 03/25/2022 3/25/2022    Interpretation Summary    ECG: Resting ECG demonstrates atrial fibrillation.   Stress Test: A pharmacological stress test was performed using lexiscan. The patient reported no chest pain during the stress test.    INDICATION: Atrial fibrillation. History of hypertension    COMPARISON:  None. CORRELATIVE IMAGING STUDIES:  None    TRACER: Tc 99m Sestamibi    TECHNIQUE:  Resting SPECT images of the heart were obtained following the uneventful intravenous administration of 10.3 mCi of Tc 99m Sestamibi. Gated stress SPECT images of the heart were obtained following Lexiscan protocol and the uneventful intravenous administration of 32.5 mCi of Tc 99m Sestamibi. FINDINGS:  The rest and stress perfusion images demonstrate no significant perfusion defect or evidence of myocardial reversibility. The gated images demonstrate global hypokinesia. Left ventricular ejection fraction is 42 %. Impression:  No evidence of myocardial ischemia or infarction. Left ventricular ejection fraction is 42 %. Global hypokinesia.     Signed by: Mike Melvin MD on 3/25/2022  3:07 PM      PMH  Past Medical History:   Diagnosis Date    Acute CHF (congestive heart failure) ([address] 3/25/2022    Arthritis     DJD    Autoimmune disease ([address]     psoriasis/others r/t autoimmune diseases - pt unsure of names    Cerebellar ataxia ([address]     previous hx \"cerebellar ataxia\"    Chronic pain     DDD/fibromyalgia    Fibromyalgia     fibromyalgia    GERD (gastroesophageal reflux disease)     History of degenerative disc disease     Hypercholesterolemia     Lexiscan 3-13-18 WNL EF 80% Cristofer    Hypertension     Liver disease     fatty liver    Orthostasis 2014    Primary hypertension 3/26/2022    Psychiatric disorder     depression/anxiety    Skin cancer     skin cancer - squamous and basal cell    Thyroid disease       Social Hx  Social History     Socioeconomic History    Marital status:      Spouse name: Not on file    Number of children: Not on file    Years of education: Not on file    Highest education level: Not on file   Occupational History    Not on file   Tobacco Use    Smoking status: Former Smoker     Quit date: 2008     Years since quittin.1    Smokeless tobacco: Never Used    Tobacco comment: quit smoking cigarettes   Substance and Sexual Activity    Alcohol use: No    Drug use: No    Sexual activity: Not on file   Other Topics Concern    Not on file   Social History Narrative    ** Merged History Encounter **          Social Determinants of Health     Financial Resource Strain:     Difficulty of Paying Living Expenses: Not on file   Food Insecurity:     Worried About Running Out of Food in the Last Year: Not on file    Mitch of Food in the Last Year: Not on file   Transportation Needs:     Lack of Transportation (Medical): Not on file    Lack of Transportation (Non-Medical):  Not on file   Physical Activity:     Days of Exercise per Week: Not on file    Minutes of Exercise per Session: Not on file   Stress:     Feeling of Stress : Not on file   Social Connections:     Frequency of Communication with Friends and Family: Not on file    Frequency of Social Gatherings with Friends and Family: Not on file    Attends Tenriism Services: Not on file    Active Member of Clubs or Organizations: Not on file    Attends Club or Organization Meetings: Not on file    Marital Status: Not on file   Intimate Partner Violence:     Fear of Current or Ex-Partner: Not on file    Emotionally Abused: Not on file    Physically Abused: Not on file   Francisco Hernandez Sexually Abused: Not on file   Housing Stability:     Unable to Pay for Housing in the Last Year: Not on file    Number of Places Lived in the Last Year: Not on file    Unstable Housing in the Last Year: Not on file       ROS:  Pertinent items are noted in HPI. Objective:      Physical Exam:                Visit Vitals  BP (!) 144/82 (BP 1 Location: Left upper arm, BP Patient Position: Sitting)   Pulse (!) 101   Temp 98.1 °F (36.7 °C)   Resp 18   Ht 5' 6\" (1.676 m)   Wt 143 lb 4.8 oz (65 kg)   SpO2 95%   BMI 23.13 kg/m²          General Appearance:   Well developed, well nourished,alert and oriented x 3, and   individual in no acute distress. Ears/Nose/Mouth/Throat:    Hearing grossly normal.         Neck:  Supple. Chest:    Lungs to auscultation bilaterally. Cardiovascular:   Irregularly irregular rate and rhythm, S1, S2 normal, no murmur. Abdomen:    Soft, non-tender, bowel sounds are active. Extremities:  No edema bilaterally. Skin:  Warm and dry.      Telemetry: afib, rate controlled          Data Review:    Labs:    Recent Results (from the past 24 hour(s))   METABOLIC PANEL, BASIC    Collection Time: 03/27/22  6:17 AM   Result Value Ref Range    Sodium 137 136 - 145 mmol/L    Potassium 3.4 (L) 3.5 - 5.1 mmol/L    Chloride 103 97 - 108 mmol/L    CO2 27 21 - 32 mmol/L    Anion gap 7 5 - 15 mmol/L    Glucose 96 65 - 100 mg/dL    BUN 17 6 - 20 MG/DL    Creatinine 1.13 (H) 0.55 - 1.02 MG/DL    BUN/Creatinine ratio 15 12 - 20      GFR est AA 56 (L) >60 ml/min/1.73m2    GFR est non-AA 47 (L) >60 ml/min/1.73m2    Calcium 8.3 (L) 8.5 - 10.1 MG/DL   ECHO ADULT COMPLETE    Collection Time: 03/27/22  9:17 AM   Result Value Ref Range    IVSd 0.9 0.6 - 0.9 cm    LVIDd 4.9 3.9 - 5.3 cm    LVIDs 3.9 cm    LVPWd 1.1 (A) 0.6 - 0.9 cm    EF BP 41 (A) 55 - 100 %    LV Ejection Fraction A2C 52 %    LV Ejection Fraction A4C 25 %    LV EDV A2C 51 mL    LV EDV A4C 43 mL    LV EDV BP 49 (A) 56 - 104 mL    LV ESV A2C 24 mL    LV ESV A4C 32 mL    LV ESV BP 29 19 - 49 mL    LVOT Peak Gradient 3 mmHg    LVOT Peak Velocity 0.9 m/s    RVSP 27 mmHg    RV Free Wall Peak S' 8 cm/s    LA Diameter 3.3 cm    Est. RA Pressure 3 mmHg    AV Peak Gradient 6 mmHg    AV Peak Velocity 1.2 m/s    MV E Velocity 0.88 m/s    LV E' Lateral Velocity 8 cm/s    LV E' Septal Velocity 3 cm/s    PV Peak Gradient 3 mmHg    PV Max Velocity 0.9 m/s    TAPSE 1.3 (A) 1.5 - 2.0 cm    TR Peak Gradient 24 mmHg    TR Max Velocity 2.47 m/s    Aortic Root 3.5 cm    Fractional Shortening 2D 20 28 - 44 %    LV ESV Index BP 17 mL/m2    LV EDV Index BP 28 mL/m2    LV ESV Index A4C 18 mL/m2    LV EDV Index A4C 25 mL/m2    LV ESV Index A2C 14 mL/m2    LV EDV Index A2C 29 mL/m2    LVIDd Index 2.82 cm/m2    LVIDs Index 2.24 cm/m2    LV RWT Ratio 0.45     LV Mass 2D 176.0 (A) 67 - 162 g    LV Mass 2D Index 101.2 (A) 43 - 95 g/m2    E/E' Ratio (Averaged) 20.17     E/E' Lateral 11.00     E/E' Septal 29.33     LA Size Index 1.90 cm/m2    LA/AO Root Ratio 0.94     Ao Root Index 2.01 cm/m2    AV Velocity Ratio 0.75           Radiology:        Current Facility-Administered Medications   Medication Dose Route Frequency    potassium chloride SR (KLOR-CON 10) tablet 40 mEq  40 mEq Oral DAILY    atorvastatin (LIPITOR) tablet 20 mg  20 mg Oral QHS    [Held by provider] losartan (COZAAR) tablet 50 mg  50 mg Oral DAILY    apixaban (ELIQUIS) tablet 5 mg  5 mg Oral Q12H    saline peripheral flush soln 20 mL  20 mL InterCATHeter PRN    LORazepam (ATIVAN) tablet 1 mg  1 mg Oral QHS PRN    loperamide (IMODIUM) capsule 2 mg  2 mg Oral PRN    [Held by provider] metoprolol tartrate (LOPRESSOR) tablet 50 mg  50 mg Oral BID    sodium chloride (NS) flush 5-40 mL  5-40 mL IntraVENous Q8H    sodium chloride (NS) flush 5-40 mL  5-40 mL IntraVENous PRN    acetaminophen (TYLENOL) tablet 650 mg  650 mg Oral Q6H PRN    Or    acetaminophen (TYLENOL) suppository 650 mg  650 mg Rectal Q6H PRN    polyethylene glycol (MIRALAX) packet 17 g  17 g Oral DAILY PRN    ondansetron (ZOFRAN ODT) tablet 4 mg  4 mg Oral Q8H PRN    Or    ondansetron (ZOFRAN) injection 4 mg  4 mg IntraVENous Q6H PRN    pantoprazole (PROTONIX) tablet 40 mg  40 mg Oral ACB    FLUoxetine (PROzac) capsule 80 mg  80 mg Oral DAILY    metoprolol (LOPRESSOR) injection 5 mg  5 mg IntraVENous Q6H PRN          Natalie Damico MD     [facility] [address] [place] [address] 13, [address] [facility] [address]   [facility]   [phone]

## 2022-03-28 NOTE — TELEPHONE ENCOUNTER
Patient states that the cost of the Eliquis and Amiodarone are too expensive and she is unable to affordthem.  She is inquiring whether there are  Alternate, meds she can have prescribed            PHONE:[phone]

## 2022-03-28 NOTE — DISCHARGE SUMMARY
Hospitalist Discharge Summary     Patient ID:  Trenton Rosa  [id number]  64 y.o.  1945  3/24/2022    PCP on record: Anand Vidal MD    Admit date: 3/24/2022  Discharge date and time: 3/28/2022    DISCHARGE DIAGNOSIS:  Tachycardia induced cardiomyopathy  A. fib with RVR  HTN    CONSULTATIONS:  IP CONSULT TO CARDIOLOGY  IP CONSULT TO HOSPITALIST    Excerpted HPI from H&P of [place] MD:  Trenton Rosa is a 64 y.o. female who presents with sob      History of hypertension, hyperlipidemia, atrial fibrillation, arthritis, psoriasis, skin cancer, degenerative disc disease, fibromyalgia, GERD, depression/anxiety, thyroid disease.  She presents with complaints of shortness of breath that has been increasing over the last 2 months.  No fever.  She does report achy left-sided chest pain that began today.  It radiates to her back.  She states it is worse with inspiration.  She states that she stopped all of her medicine several months ago to see how things would go without them. Froylan Ibrahim has had 2 months of a dry cough.  The dyspnea is worse with exertion.  No lower extremity swelling. . she was evaluated by at the office by her cardilogist Dr. Leo Hansen and she was referred to ER for further management. In ER, noticed rapid afib, she was given cardiziem 10mg iv one dose, HR around 100 now.     ______________________________________________________________________  DISCHARGE SUMMARY/HOSPITAL COURSE:  for full details see H&P, daily progress notes, labs, consult notes. Rapid atrial fibrillation w RVR now resolved- Cont amio/lopressor/eliquis.   She has follow-up with cardiology    Acute on chronic DHF NYHA class III on admit and Class 1 in Discharge  stress test per cardiology is negative , EF 42 %   S/p  IV diuresis   Per cardiology it was tachycardia induced cardiomyopathy  Can resume diuretics as outpatient after discussing with cardiology  Currently, well compensated    Abdominal distention- Resolved, CT abdomen pelvis without any acute  Pathology,     Hypertension-BP stable on current meds    Code status: Full  DVT prophylaxis: Lovenox       Anticipated Discharge: Home today    ______________________________________________________________________  Patient seen and examined by me on discharge day. Patient maximized inpatient benefit stay. Patient ambulating in room without oxygen. Blood pressure is stable. She is eager and adamant to go home. No other acute issues reported to me by patient or staff at this time. Patient will follow up with her primary cardiologist and PCP as outpatient. Patient agreed and verbalized understanding of discharge plan and instructions at this time. _______________________________________________________________________  DISCHARGE MEDICATIONS:   Current Discharge Medication List      START taking these medications    Details   amiodarone (PACERONE) 400 mg tablet Take 1 Tablet by mouth two (2) times a day. Qty: 30 Tablet, Refills: 0  Start date: 3/28/2022      apixaban (ELIQUIS) 5 mg tablet Take 1 Tablet by mouth every twelve (12) hours. Qty: 30 Tablet, Refills: 1  Start date: 3/28/2022      carvediloL (COREG) 6.25 mg tablet Take 1 Tablet by mouth two (2) times daily (with meals). Qty: 30 Tablet, Refills: 1  Start date: 3/28/2022      losartan (COZAAR) 50 mg tablet Take 0.5 Tablets by mouth daily. Qty: 30 Tablet, Refills: 0  Start date: 3/29/2022         CONTINUE these medications which have NOT CHANGED    Details   FLUoxetine (PROzac) 40 mg capsule Take 80 mg by mouth daily. LORazepam (ATIVAN) 1 mg tablet Take  by mouth nightly as needed for Anxiety. atorvastatin (LIPITOR) 20 mg tablet Take 20 mg by mouth nightly. omeprazole (PRILOSEC) 40 mg capsule Take 40 mg by mouth daily. L gasseri/B bifidum/B longum ([email or web address] PO) Take  by mouth. Takes one po once daily. sertraline (ZOLOFT) 100 mg tablet Take 100 mg by mouth daily.       biotin 1 mg tab Take 1,000 mcg by mouth nightly. traZODone (DESYREL) 50 mg tablet Take 1 Tab by mouth nightly. Qty: 90 Tab, Refills: 1      aspirin delayed-release 81 mg tablet Take 81 mg by mouth daily. FOLIC ACID PO Take 344 mg by mouth daily. Cholecalciferol, Vitamin D3, (VITAMIN D3) 2,000 unit cap capsule Take 2,000 Units by mouth daily. Refills: 0      cyanocobalamin (VITAMIN B-12) 1,000 mcg tablet Take 1 Tab by mouth two (2) times a day. Qty: 60 Each, Refills: 0      potassium chloride (K-DUR, KLOR-CON) 20 mEq tablet Take 20 mEq by mouth daily. STOP taking these medications       metoprolol tartrate (LOPRESSOR) 25 mg tablet Comments:   Reason for Stopping:                 Patient Follow Up Instructions:    Activity: Activity as tolerated  Diet: Cardiac Diet  Wound Care: None needed    Follow-up Information     Follow up With Specialties Details Why Agnes Grider MD Family Medicine In 1 week Check CBC/ [facility] Dr Jaime Perez [address] [place] [id number] [phone]      Chantel Loera MD Cardiology In 2 weeks For heart issues [facility] [address]  [address]  [place] [address]  [phone]          ________________________________________________________________    Risk of deterioration: Low    Condition at Discharge:  Stable  __________________________________________________________________    Disposition  Home with family, no needs    ____________________________________________________________________    Code Status: Full Code  ___________________________________________________________________      Total time in minutes spent coordinating this discharge  37  minutes    Signed:  MD Crys Amin

## 2022-03-28 NOTE — DISCHARGE INSTRUCTIONS
Diet cardiac  Activity slowly increase as tolerated  Check CBC/BMP at PCPs office in 1 week  Report any black stools, melena, worsening shortness of breath or reoccurrence of symptoms to nearest ER or call PCP immediately

## 2022-03-28 NOTE — PROGRESS NOTES
Bedside shift change report given to [address]  (oncoming nurse) by Mely Sewell RN  (offgoing nurse). Report included the following information SBAR, Kardex, MAR and Recent Results.

## 2022-03-28 NOTE — PROGRESS NOTES
Cardiology Progress Note      3/28/2022 11:46 AM    Admit Date: 3/24/2022    Admit Diagnosis: Rapid atrial fibrillation ([address] [I48.91]      Subjective:     Ashutoshkimberly Draper  No chest pain.  Breathing ok    Visit Vitals  /61 (BP 1 Location: Left upper arm, BP Patient Position: At rest)   Pulse 79   Temp 97.8 °F (36.6 °C)   Resp 18   Ht 5' 6\" (1.676 m)   Wt 143 lb 4.8 oz (65 kg)   SpO2 94%   BMI 23.13 kg/m²     Current Facility-Administered Medications   Medication Dose Route Frequency    carvediloL (COREG) tablet 6.25 mg  6.25 mg Oral BID WITH MEALS    amiodarone (CORDARONE) tablet 400 mg  400 mg Oral BID    potassium chloride SR (KLOR-CON 10) tablet 40 mEq  40 mEq Oral DAILY    atorvastatin (LIPITOR) tablet 20 mg  20 mg Oral QHS    losartan (COZAAR) tablet 50 mg  50 mg Oral DAILY    apixaban (ELIQUIS) tablet 5 mg  5 mg Oral Q12H    saline peripheral flush soln 20 mL  20 mL InterCATHeter PRN    LORazepam (ATIVAN) tablet 1 mg  1 mg Oral QHS PRN    loperamide (IMODIUM) capsule 2 mg  2 mg Oral PRN    sodium chloride (NS) flush 5-40 mL  5-40 mL IntraVENous Q8H    sodium chloride (NS) flush 5-40 mL  5-40 mL IntraVENous PRN    acetaminophen (TYLENOL) tablet 650 mg  650 mg Oral Q6H PRN    Or    acetaminophen (TYLENOL) suppository 650 mg  650 mg Rectal Q6H PRN    polyethylene glycol (MIRALAX) packet 17 g  17 g Oral DAILY PRN    ondansetron (ZOFRAN ODT) tablet 4 mg  4 mg Oral Q8H PRN    Or    ondansetron (ZOFRAN) injection 4 mg  4 mg IntraVENous Q6H PRN    pantoprazole (PROTONIX) tablet 40 mg  40 mg Oral ACB    FLUoxetine (PROzac) capsule 80 mg  80 mg Oral DAILY    metoprolol (LOPRESSOR) injection 5 mg  5 mg IntraVENous Q6H PRN         Objective:      Physical Exam:  Visit Vitals  /61 (BP 1 Location: Left upper arm, BP Patient Position: At rest)   Pulse 79   Temp 97.8 °F (36.6 °C)   Resp 18   Ht 5' 6\" (1.676 m)   Wt 143 lb 4.8 oz (65 kg)   SpO2 94%   BMI 23.13 kg/m²     General Appearance:  no acute distress. Ears/Nose/Mouth/Throat:   Hearing grossly normal.         Neck: Supple. Chest:   Lungs clear to auscultation bilaterally. Cardiovascular:  Irregularly irregular, S1, S2 normal, no murmur. Abdomen:   Soft, non-tender, bowel sounds are active. Extremities: No edema bilaterally. Skin: Warm and dry. Data Review:   Labs:    Recent Results (from the past 24 hour(s))   METABOLIC PANEL, BASIC    Collection Time: 03/28/22  5:29 AM   Result Value Ref Range    Sodium 134 (L) 136 - 145 mmol/L    Potassium 4.4 3.5 - 5.1 mmol/L    Chloride 105 97 - 108 mmol/L    CO2 22 21 - 32 mmol/L    Anion gap 7 5 - 15 mmol/L    Glucose 110 (H) 65 - 100 mg/dL    BUN 19 6 - 20 MG/DL    Creatinine 1.04 (H) 0.55 - 1.02 MG/DL    BUN/Creatinine ratio 18 12 - 20      GFR est AA >60 >60 ml/min/1.73m2    GFR est non-AA 51 (L) >60 ml/min/1.73m2    Calcium 8.5 8.5 - 10.1 MG/DL       Telemetry: afib    Assessment/Plan:     Afib. HR controlled. [address] with eliquis. Outpt follow up with . [facility]. Cardiomyopathy. Possibly tachycardia induced. Stress test no ischemia. EF 40% by echo. BB, ARB.

## 2022-03-28 NOTE — PROGRESS NOTES
3/28/2022 -   TRANSITIONS OF CARE PLAN:   1. RUR: 8%; LOW  2. DESTINATION: Own Home   3. TRANSPORT: Family  4. NEEDS FOR DISCHARGE: None Noted  5. ANTICIPATED FOLLOW UPS: PCP, Cardio  6. ONGOING INPATIENT NEEDS: Discharge    Anticipated Discharge is: Less than 24 Hours    CM notes that patient has been cleared for discharge to own home. No additional CM Needs noted at this time. Medicare pt has received, reviewed, and signed 2nd IM letter informing them of their right to appeal the discharge. Signed copy has been placed on pt bedside chart. Care Management Interventions  PCP Verified by CM: Yes  Palliative Care Criteria Met (RRAT>21 & CHF Dx)?: No  Mode of Transport at Discharge:  Other (see comment) (family)  Hospital Transport Time of Discharge: 1159  Transition of Care Consult (CM Consult): Discharge Planning  MyChart Signup: Yes  Discharge Durable Medical Equipment: No  Physical Therapy Consult: No  Occupational Therapy Consult: No  Speech Therapy Consult: No  Support Systems: Child(yue)  Confirm Follow Up Transport: Family  The Patient and/or Patient Representative was Provided with a Choice of Provider and Agrees with the Discharge Plan?: Yes  Freedom of Choice List was Provided with Basic Dialogue that Supports the Patient's Individualized Plan of Care/Goals, Treatment Preferences and Shares the Quality Data Associated with the Providers?: Yes   Resource Information Provided?: No  Discharge Location  Patient Expects to be Discharged to[de-identified] Home with family assistance     CRM: Alex Prince, MPH, [address] [place] [address]; Z: [phone]

## 2022-03-28 NOTE — PROGRESS NOTES
Hospital follow-up PCP transitional care appointment has been scheduled with Dr. Sindy Douglass for Thursday, 3/31/22 at 11:00 a.m. Pending patient discharge. Lajean Lennox, Care Management Assistant.

## 2022-03-28 NOTE — PROGRESS NOTES
[facility]  Hospitalist Group                                                                                          Hospitalist Progress Note  Yayo Manuel MD  Answering service: [phone] or [phone] from in house phone        Date of Service:  3/28/2022  NAME:  Niko Lentz  :  1945  MRN:  [id number]      Admission Summary:   Niko Lentz is a 64 y.o. female who presents with sob      History of hypertension, hyperlipidemia, atrial fibrillation, arthritis, psoriasis, skin cancer, degenerative disc disease, fibromyalgia, GERD, depression/anxiety, thyroid disease.  She presents with complaints of shortness of breath that has been increasing over the last 2 months.  No fever.  She does report achy left-sided chest pain that began today.  It radiates to her back.  She states it is worse with inspiration.  She states that she stopped all of her medicine several months ago to see how things would go without them. Mera Acevedo has had 2 months of a dry cough.  The dyspnea is worse with exertion.  No lower extremity swelling. . she was evaluated by at the office by her cardilogist Dr. Laura Trevino and she was referred to ER for further management. Interval history / Subjective:   Patient seen and examined, chart was reviewed. Cleared by cardiology to go home. Blood pressure is stable at this time. Heart rate is fairly controlled. Patient eager and adamant to go home today. Assessment & Plan:     Rapid atrial fibrillation w RVR now resolved- Cont amio/lopressor/eliquis.   She has follow-up with cardiology    Acute on chronic DHF NYHA class III on admit and Class 1 in Discharge  stress test per cardiology is negative , EF 42 %   S/p  IV diuresis   Per cardiology it was tachycardia induced cardiomyopathy  Can resume diuretics as outpatient after discussing with cardiology  Currently, well compensated    Abdominal distention- Resolved, CT abdomen pelvis without any acute  Pathology, Hypertension-BP stable on current meds    Code status: Full  DVT prophylaxis: Lovenox       Anticipated Discharge: Home today       Vital Signs:    Last 24hrs VS reviewed since prior progress note. Most recent are:  Visit Vitals  /61 (BP 1 Location: Left upper arm, BP Patient Position: At rest)   Pulse 79   Temp 97.8 °F (36.6 °C)   Resp 18   Ht 5' 6\" (1.676 m)   Wt 65 kg (143 lb 4.8 oz)   SpO2 94%   BMI 23.13 kg/m²       No intake or output data in the 24 hours ending 03/28/22 1015     Physical Examination:     I had a face to face encounter with this patient and independently examined them on 3/28/2022 as outlined below:          General : Alert, awake, no acute distress  Chest: Symmetric expansion  CVS: Irregularly  Abd: Nondistended  Ext: no clubbing, no cyanosis, no edema,    Neuro/Psych: No focal neurological deficit            Labs:     No results for input(s): WBC, HGB, HCT, PLT, HGBEXT, HCTEXT, PLTEXT, HGBEXT, HCTEXT, PLTEXT in the last 72 hours. Recent Labs     03/28/22  0529 03/27/22  0617   * 137   K 4.4 3.4*    103   CO2 22 27   BUN 19 17   CREA 1.04* 1.13*   * 96   CA 8.5 8.3*     No results for input(s): ALT, AP, TBIL, TBILI, TP, ALB, GLOB, GGT, AML, LPSE in the last 72 hours. No lab exists for component: SGOT, GPT, AMYP, HLPSE  No results for input(s): INR, PTP, APTT, INREXT, INREXT in the last 72 hours. No results for input(s): FE, TIBC, PSAT, FERR in the last 72 hours. Lab Results   Component Value Date/Time    Folate 3.9 (L) 04/12/2014 04:11 AM      No results for input(s): PH, PCO2, PO2 in the last 72 hours. No results for input(s): CPK, CKNDX, TROIQ in the last 72 hours.     No lab exists for component: CPKMB  Lab Results   Component Value Date/Time    Cholesterol, total 182 02/18/2016 04:05 PM    HDL Cholesterol 78 02/18/2016 04:05 PM    LDL, calculated 83 02/18/2016 04:05 PM    Triglyceride 106 02/18/2016 04:05 PM    CHOL/HDL Ratio 2.4 05/13/2014 04:15 AM No results found for: [facility]  Lab Results   Component Value Date/Time    Color YELLOW/STRAW 04/14/2015 10:40 AM    Appearance CLEAR 04/14/2015 10:40 AM    Specific gravity 1.015 04/14/2015 10:40 AM    pH (UA) 6.0 04/14/2015 10:40 AM    Protein NEGATIVE  04/14/2015 10:40 AM    Glucose NEGATIVE  04/14/2015 10:40 AM    Ketone NEGATIVE  04/14/2015 10:40 AM    Bilirubin NEGATIVE  04/14/2015 10:40 AM    Urobilinogen 0.2 04/14/2015 10:40 AM    Nitrites NEGATIVE  04/14/2015 10:40 AM    Leukocyte Esterase NEGATIVE  04/14/2015 10:40 AM    Epithelial cells FEW 04/14/2015 10:40 AM    Bacteria NEGATIVE  04/14/2015 10:40 AM    WBC 0-4 04/14/2015 10:40 AM    RBC 0-5 04/14/2015 10:40 AM         Medications Reviewed:     Current Facility-Administered Medications   Medication Dose Route Frequency    carvediloL (COREG) tablet 6.25 mg  6.25 mg Oral BID WITH MEALS    amiodarone (CORDARONE) tablet 400 mg  400 mg Oral BID    potassium chloride SR (KLOR-CON 10) tablet 40 mEq  40 mEq Oral DAILY    atorvastatin (LIPITOR) tablet 20 mg  20 mg Oral QHS    losartan (COZAAR) tablet 50 mg  50 mg Oral DAILY    apixaban (ELIQUIS) tablet 5 mg  5 mg Oral Q12H    saline peripheral flush soln 20 mL  20 mL InterCATHeter PRN    LORazepam (ATIVAN) tablet 1 mg  1 mg Oral QHS PRN    loperamide (IMODIUM) capsule 2 mg  2 mg Oral PRN    sodium chloride (NS) flush 5-40 mL  5-40 mL IntraVENous Q8H    sodium chloride (NS) flush 5-40 mL  5-40 mL IntraVENous PRN    acetaminophen (TYLENOL) tablet 650 mg  650 mg Oral Q6H PRN    Or    acetaminophen (TYLENOL) suppository 650 mg  650 mg Rectal Q6H PRN    polyethylene glycol (MIRALAX) packet 17 g  17 g Oral DAILY PRN    ondansetron (ZOFRAN ODT) tablet 4 mg  4 mg Oral Q8H PRN    Or    ondansetron (ZOFRAN) injection 4 mg  4 mg IntraVENous Q6H PRN    pantoprazole (PROTONIX) tablet 40 mg  40 mg Oral ACB    FLUoxetine (PROzac) capsule 80 mg  80 mg Oral DAILY    metoprolol (LOPRESSOR) injection 5 mg  5 mg IntraVENous Q6H PRN     ______________________________________________________________________  EXPECTED LENGTH OF STAY: - - -  ACTUAL LENGTH OF STAY:          4      Please note that this dictation was completed with [employer], the computer voice recognition software. Quite often unanticipated grammatical, syntax, homophones, and other interpretive errors are inadvertently transcribed by the computer software. Please disregard these errors. Please excuse any errors that have escaped final proofreading.                 Yayo Do MD

## 2022-03-29 ENCOUNTER — TELEPHONE (OUTPATIENT)
Dept: CASE MANAGEMENT | Age: 77
End: 2022-03-29

## 2022-03-29 NOTE — TELEPHONE ENCOUNTER
Verified patient with two types of identifiers. Spoke to patient about her medications. She reports the Eliquis and Amiodarone are both over $200. Let patient know I will talk to her insurance and pharmacy. Also activated a free 30 day coupon for patient and sent to pharmacy. Patient verbalized understanding and will call with any other questions.

## 2022-03-29 NOTE — TELEPHONE ENCOUNTER
[facility] NN placed call to patient's preferred number. Received voice mail greeting stating \"ifcaller is not a relative or friend then I do not want to talk to you\". Did not leave a message.

## 2022-03-29 NOTE — TELEPHONE ENCOUNTER
Patient's son Moses Gutierrez is calling because his mother would like to know if there are some alternate medicine that she can change from the medications that she receive in the hospital because some of them are not affordable.     Amiodarone and Eliquis are the most expensive ones that they can't afford that she received at the hospital.    [phone]

## 2022-03-30 ENCOUNTER — TELEPHONE (OUTPATIENT)
Dept: CASE MANAGEMENT | Age: 77
End: 2022-03-30

## 2022-03-30 RX ORDER — AMIODARONE HYDROCHLORIDE 200 MG/1
200 TABLET ORAL 2 TIMES DAILY
Qty: 60 TABLET | Refills: 2 | Status: SHIPPED | OUTPATIENT
Start: 2022-03-30 | End: 2022-03-30

## 2022-03-30 RX ORDER — AMIODARONE HYDROCHLORIDE 200 MG/1
TABLET ORAL
Qty: 70 TABLET | Refills: 3 | Status: SHIPPED | OUTPATIENT
Start: 2022-03-30 | End: 2022-04-27 | Stop reason: SDUPTHER

## 2022-03-30 NOTE — TELEPHONE ENCOUNTER
Verified patient with two types of identifiers. Spoke to pharmacy about patient's amio and Eliquis. They will use free 30 day coupon I provided for the Eliquis and the Amiodarone 200mg tablet is $16 for 30 days.  Changed Rx per VO by MD.

## 2022-03-30 NOTE — TELEPHONE ENCOUNTER
Verified patient with two types of identifiers. Updated patient and son about medication costs. They were appreciative. Confirmed follow up.

## 2022-03-30 NOTE — TELEPHONE ENCOUNTER
HEART FAILURE NURSE NAVIGATOR POST DISCHARGE FOLLOW UP PHONE CALL     [facility] NN contacted patient by telephone to perform post hospital discharge follow up call. Verified patient name and date of birth as identifiers. Provided introduction to self and role. Reviewed discharge instructions; confirmed patient is in receipt of all prescribed HF medications. Patient states she did not get amiodarone and eliquis due to high cost.  Patient states she has spoken with someone from cardiology office and they are trying to get her some help. Reinforced importance of daily weights and dietary restrictions, following low sodium diet. Reinforced signs/symptoms of HF and when to notify the physician. Confirmed knowledge of scheduled follow up appointment: Patient states she has f/u appt with PCP 3/31/22. Is aware of cardiology follow up with Carly Gutierrez NP on 4/13/22. Confirmed patient has transportation to above appointment. Patient given opportunity to ask questions/express concerns. All questions answered with good understanding.

## 2022-03-31 NOTE — ADT AUTH CERT NOTES
Utilization Reviews         Atrial Fibrillation - Care Day 4 (3/27/2022) by Rowena Smith       Review Entered Review Status   3/30/2022 16:06 Completed      Criteria Review      Care Day: 4 Care Date: 3/27/2022 Level of Care: Telemetry    Guideline Day 2    Clinical Status    ( ) * Hemodynamic stability    3/30/2022 16:06:30 EDT by Rowena Smith      , 103, 104    ( ) * Sinus rhythm or acceptable ventricular rate    3/30/2022 16:06:30 EDT by Rowena Smith      Afib    (X) * No evidence of myocardial ischemia    3/30/2022 16:06:30 EDT by Rowena Smith      Nuclear shows no ischemia    (X) * Mental status at baseline    3/30/2022 16:06:30 EDT by Rowena Smith      General : Alert, awake    (X) * Tachypnea absent    3/30/2022 16:06:30 EDT by Rowena Smith      RR 18    (X) * Hypoxemia absent    3/30/2022 16:06:30 EDT by Rowena Smith      95% on RA    (X) * Anticoagulants regimen for next level of care established    3/30/2022 16:06:30 EDT by Rowena Smith      [place] [facility] [place] 5 mg twice daily.     ( ) * Antiarrhythmic medication absent or no requirement for further inpatient ECG monitoring    3/30/2022 16:06:30 EDT by Inge Torres with plans for eventual CV    ( ) * Discharge plans and education understood    Activity    (X) * Ambulatory or acceptable for next level of care    3/30/2022 16:06:30 EDT by Rowena Smith      activity as tolerated with assist    Routes    (X) * Oral hydration    3/30/2022 16:06:30 EDT by Rowena Smith      adult regular diet 2gNa    (X) * Oral medications or regimen acceptable for next level of care    3/30/2022 16:06:30 EDT by Rowena Smith      PO meds    (X) * Oral diet or acceptable for next level of care    3/30/2022 16:06:30 EDT by Zain Harris adult regular diet 2gNa    Interventions    (X) Cardiac monitoring    3/30/2022 16:06:30 EDT by Rowena Smith      cardiac monitoring    Medications    (X) Anticoagulants    3/30/2022 16:06:30 EDT by Kasi Rubio      Eliquis 5mg PO q12    (X) Possible rate and rhythm control medications    3/30/2022 16:06:30 EDT by Kasi Rubio      Amiodarone 400mg PO BID    * Milestone   Additional Notes   Date of care: 3/27/2022      IP-LOC-Telemetry      IM PN: Interval history / Subjective:   Patient resting in bed, no complaint   Assessment & Plan:   Rapid atrial fibrillation   Patient admitted with PRAVEEN tilley with RVR, currently rate controlled, on oral rate control agents that is lopressor , an on eliquis        Acute on chronic diastolic congestive heart failure NYHA class III    stress test per cardiology is negative , EF 42 %    , IV diuresis, strict I's and O's, continue to monitor   Will need lasix on discharge        Abdominal distention   CT abdomen pelvis without any acute intra-abdominal pathology, patient symptom resolved, monitor       Hypertension   Labile blood pressure, got albumin , would hold off on more albumin    On 2 L    Will need ambulatory test to assess if O2 needed on discharge or not         Hypotension    Hypotension this am and all the blood pressure medications including losartan and metoprolol have been held. [place] has been held this morning.  We will watch her blood pressure another day and if it is stabilized we might decrease her dose of losartan and metoprolol and give very low-dose of Lasix on discharge home tomorrow   General : Alert, awake, no acute distress   HEENT: PEERL, EOMI, moist mucus membrane, TM clear   Neck: supple, no JVD, no meningeal signs   Chest: Clear to auscultation bilaterally    CVS: Irregularly   Abd: soft/ Non tender, non distended, BS physiological,    Ext: no clubbing, no cyanosis, no edema, brisk 2+ DP pulses   Neuro/Psych: No focal neurological deficit   Skin: warm      Cardiology PN: 1.  Atrial fibrillation rapidV  Rate   Now in atrial fibrillation with a controlled rate on Lopressor 50 mg twice daily   Nuclear shows no ischemia   Can [place] [facility] [place] Eliquis 5 mg twice daily. Start amiodaorne with plans for eventual CV   2. 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  She should continue her CHF meds even if her blood pressures in the 90s long as she is mentating well.  Often blood pressure will be lower in heart failure patients this does not require stopping the med unless they become too low. Echo shows an EF of 41% similar to the nuclear. Nino Spatz is no ischemia and no wall motion normality so I think she can be managed medically.  Her BNP was 12,000 and is improved 8000. NYHA III. EF on nuclear is 42% and clinically she had heart failure.  Her proBNP was elevated.  She now appears to be euvolemic with improved heart rate control and diuresis.  She can be switched to p.o. Lasix.  I will go ahead and start ARB and and she is on a beta-blocker   3.  Mild JOHN creatinine has improved   4.  Hypokalemia keep K and mag in normal range given arrhythmia   5.  Hypertension she was unstable but, now off of albumin, prior to hospital stay on HCTZ will hold off on that for now as we add an ARB instead   6.  Hypercholesterolemia on statin   7.  GERD on Prilosec      Vitals: Temp 98.7, , 103, 104, /98, RR 18, 95% on RA      Labs:   3/27/2022 06:17   Sodium: 137   Potassium: 3.4 (L)   Chloride: 103   Glucose: 96   BUN: 17   Creatinine: 1.13 (H)   BUN/Creatinine ratio: 15   Calcium: 8.3 (L)   GFR est non-AA: 47 (L)   GFR est AA: 56 (L)   Echo: Left Ventricle: Left ventricle size is normal. Normal wall thickness. Mild global hypokinesis present. Mildly reduced left ventricular systolic function. EF by 2D [employer] is 41%. o  Right Ventricle: Moderately reduced systolic function. TAPSE is abnormal. TAPSE is 1.3 cm.   o  Tricuspid Valve: Moderate transvalvular regurgitation.       Medications:    Atorvastatin 20mg PO every bedtime   Carvedilol 6.25mg PO BID with meals   Prozac 80mg PO daily   Ativan 1mg PO bedtime PRN x1   Protonix 40mg PO daily before breakfast   Potassium chloride 40mEq PO daily      Plan: oximetry 6 minute walk, adult regular diet 2gNa, SCDs, activity as tolerated with assist, cardiac monitoring                 Atrial Fibrillation - Care Day 3 (3/26/2022) by Nanci Nguyen       Review Entered Review Status   3/30/2022 16:00 Completed      Criteria Review      Care Day: 3 Care Date: 3/26/2022 Level of Care: Telemetry    Guideline Day 2    Clinical Status    (X) * Hemodynamic stability    3/30/2022 16:00:08 EDT by Nanci Nguyen      VSS    ( ) * Sinus rhythm or acceptable ventricular rate    3/30/2022 16:00:08 EDT by Nanci Nguyen      Afib    (X) * No evidence of myocardial ischemia    3/30/2022 16:00:08 EDT by Nanci Nguyen      Nuclear shows no ischemia    (X) * Mental status at baseline    3/30/2022 16:00:08 EDT by Nanci Nguyen      General : Alert, awake    (X) * Tachypnea absent    3/30/2022 16:00:08 EDT by Nanci Nguyen      RR 18    (X) * Hypoxemia absent    3/30/2022 16:00:08 EDT by Nanci Nguyen      95% on RA    ( ) * Anticoagulants regimen for next level of care established    3/30/2022 16:00:08 EDT by Nanci Nguyen      Lovenox 70mg SC q12  Eliquis 5mg PO q12    ( ) * Antiarrhythmic medication absent or no requirement for further inpatient ECG monitoring    3/30/2022 16:00:08 EDT by Nanci Nguyen      cardiac monitoring    ( ) * Discharge plans and education understood    Activity    (X) * Ambulatory or acceptable for next level of care    3/30/2022 16:00:08 EDT by Nanci Nguyen      activity as tolerated with assist    Routes    (X) * Oral hydration    3/30/2022 16:00:08 EDT by Rachel Lee adult regular diet 2gNa    ( ) * Oral medications or regimen acceptable for next level of care    3/30/2022 16:00:08 EDT by John Cristina and IV meds    (X) * Oral diet or acceptable for next level of care    3/30/2022 16:00:08 EDT by Basim Mata      adult regular diet 2gNa    Interventions    (X) Cardiac monitoring    3/30/2022 16:00:08 EDT by Basim Mata      cardiac monitoring    Medications    (X) Anticoagulants    3/30/2022 16:00:08 EDT by Basim Mata      Eliquis 5mg PO q12  Lovenox 70mg SC q12    (X) Possible rate and rhythm control medications    3/30/2022 16:00:08 EDT by Basim Mata      Metoprolol 50mg PO BID    * Milestone   Additional Notes   Date of care: 3/26/2022      IP-LOC-Telemetry      IM PN: Interval history / Subjective:   Patient resting in bed, no complaint   Assessment & Plan:   Rapid atrial fibrillation   Patient admitted with PRAVEEN tilley with RVR, currently rate controlled, on oral rate control agents that is lopressor , Lovenox twice daily that can be changed to NOAC on discharge , eliquis most likely        Acute on chronic diastolic congestive heart failure NYHA class III    stress test per cardiology is negative , EF 42 %    , IV diuresis, strict I's and O's, continue to monitor   Will need lasix on discharge        Abdominal distention   CT abdomen pelvis without any acute intra-abdominal pathology, patient symptom resolved, monitor       Hypertension   Labile blood pressure, got albumin , would hold off on more albumin    On 2 L    Will need ambulatory test to assess if O2 needed on discharge or not    General : Alert, awake, no acute distress   HEENT: PEERL, EOMI, moist mucus membrane, TM clear   Neck: supple, no JVD, no meningeal signs   Chest: Clear to auscultation bilaterally    CVS: Irregularly   Abd: soft/ Non tender, non distended, BS physiological,    Ext: no clubbing, no cyanosis, no edema, brisk 2+ DP pulses   Neuro/Psych: No focal neurological deficit   Skin: warm      Cardiology PN: Assessment/Plan/Discussion:   1.  Atrial fibrillation rapidV  rate   Now in atrial fibrillation with eulalio controlled rate on Lopressor 50 mg twice daily   Nuclear shows no ischemia   Can start [facility] [place]   2. Acute on chronic systolic CHF/cardiomyopathy   NYHA III.  We do not have an echo but the EF on nuclear is 42% and clinically she had heart failure.  Her proBNP was elevated.  She now appears to be euvolemic with improved heart rate control and diuresis.  She can be switched to p.o. Lasix.  I will go ahead and start ARB and and she is on a beta-blocker   3.  Mild JOHN creatinine has improved   4.  Hypokalemia keep K and mag in normal range given arrhythmia   5.  Hypertension she was unstable but, now off of albumin, prior to hospital stay on HCTZ will hold off on that for now as we add an ARB instead   6.  Hypercholesterolemia on statin   7.  GERD on Prilosec      Vitals: Temp 98.6, HR 92, /76, RR 18, 95% on RA      No labs at time of review      Medications:     Albumin human 25% 12.5g IV q6   Atorvastatin 20mg PO every bedtime   Prozac 80mg PO daily   Lasix 40mg IV daily   Ativan 1mg PO bedtime PRN x1   Losartan 50mg PO daily   Protonix 40mg PO daily before breakfast   Potassium chloride 40mEq PO daily      Plan: oximetry 6 minute walk, adult regular diet 2gNa, SCDs, activity as tolerated with assist, cardiac monitoring

## 2022-04-26 NOTE — PROGRESS NOTES
[facility]              OFFICE NOTE       Crista Quiroz   1945  [id number]    Date/Time:  4/28/2022   Cardiologist: Paris Solis M.D.,MARGOTH.A.C.C.    SUBJECTIVE:  She was admitted at [address] [place] [address] for dyspnea and found to have Afib and CHF. She stated that she stopped all of her medications several months ago to TAMPERE how things would go without them. \"  Echo showed EF was 40%  She is now on amiodarone 200mg BID and coreg for Afib, eliquis for anticoagulation  She is also taking coreg and losartan for CHF  She reports that she is feeling well  Has some dyspnea with exertion at time but denies any palpitations or chest pain  Reports occasional mild edema in her ankles but has none today on exam  No dizziness or lightheadedness  She is having a lot of bruising on ASA and eliquis  Discussed routine monitoring for amiodarone and therapy side effects/precautions (see patient instructions)    Assessment/Plan:    1. Afib - now back in NSR with first degree AV block, LBBB and prolonged QTc, advised her to reduce her amiodarone to 200mg daily and continue coreg, will repeat ECG at her follow up visit in 2 weeks, advised her to reduce her aspirin to every other day and discuss whether or not to continue aspirin with Dr. Guadalupe Valdez at her next visit, will continue eliquis for CVA prevention, asked her to find out what her copay for eliquis will be on her next refill and told her if it is too expensive to call the office and we will provide her samples while she applies for patient assistance, she will follow up with Dr. Guadalupe Vadlez again in 3 weeks and will repeat her ECG at that time    2. Cardiomyopathy. Possibly tachycardia induced. Stress test did not show any ischemia. EF 40% by TTE.  NYHA Class II  -Continue coreg and advised her to increase losartan 50mg daily for HTN  -Discussed repeating TTE in 2 months to reassess EF     3.   HTN - BP elevated, will increase losartan as above and continue to monitor     4. Dyslipidemia - will check fasting lipids with next lab draw, on atorvastatin     5. LBBB/prolonged QTC - new on ECG today, will reduce amiodarone dose as above and repeat ECG at next visit       HPI   69-year-old female with the past medical history remarkable for hyperlipidemia gastroesophageal reflux disease depression hypertension and more recently atrial fibrillation and cardiomyopathy who presents today for hospital follow up. CARDIAC STUDIES    ECHO ADULT COMPLETE 03/27/2022 3/27/2022    Interpretation Summary    Left Ventricle: Left ventricle size is normal. Normal wall thickness. Mild global hypokinesis present. Mildly reduced left ventricular systolic function. EF by 2D [employer] Biplane is 41%.   Right Ventricle: Moderately reduced systolic function. TAPSE is abnormal. TAPSE is 1.3 cm.   Tricuspid Valve: Moderate transvalvular regurgitation. Signed by: Jose Enrique Leach MD on 3/27/2022  9:45 PM      NUCLEAR CARDIAC STRESS TEST 03/25/2022, 03/27/2022 3/27/2022    Interpretation Summary    ECG: Resting ECG demonstrates atrial fibrillation.   Stress Test: A pharmacological stress test was performed using lexiscan. The patient reported no chest pain during the stress test.    INDICATION: Atrial fibrillation. History of hypertension    COMPARISON:  None. CORRELATIVE IMAGING STUDIES:  None    TRACER: Tc 99m Sestamibi    TECHNIQUE:  Resting SPECT images of the heart were obtained following the uneventful intravenous administration of 10.3 mCi of Tc 99m Sestamibi. Gated stress SPECT images of the heart were obtained following Lexiscan protocol and the uneventful intravenous administration of 32.5 mCi of Tc 99m Sestamibi. FINDINGS:  The rest and stress perfusion images demonstrate no significant perfusion defect or evidence of myocardial reversibility. The gated images demonstrate global hypokinesia.  Left ventricular ejection fraction is 42 %.    Impression:  No evidence of myocardial ischemia or infarction. Left ventricular ejection fraction is 42 %. Global hypokinesia. Signed by: Kalpesh Moore MD on 3/25/2022  3:07 PM, Signed by: Carla Quinonez MD on 3/27/2022 11:00 PM        EKG Results     Procedure 720 Value Units Date/Time    AMB POC EKG ROUTINE W/ 12 LEADS, INTER & REP [[id number]] Resulted: 04/27/22 1500    Order Status: Completed Updated: 04/27/22 1505        IMAGING      MRI Results (most recent):  Results from [place] encounter on 04/10/14    MRI LUMB SPINE W WO CONT    Narrative  **Final Report**      ICD Codes / Adm. Diagnosis: 781.3  E888.9 / Lack of coordination  Unspecified  fall  Examination:  MR HERB Maria AND WO CON  - [id number] - Apr 12 2014  1:46PM  Accession No:  [id number]  Reason:  muliptle falls      REPORT:  INDICATION: Recent falls    COMPARISON: None    EXAM: Sagittal T1-weighted spin-echo, sagittal T2-weighted fast spin-echo,  sagittal inversion recovery, axial T1-weighted spin-echo, axial T2-weighted  fast spin-echo, and post IV-contrast enhanced sagittal and axial  fat-suppressed T1-weighted spin echo MR images of the lumbar spine are  obtained. A total of 5.5 cc Gadavist was administered for the study. FINDINGS: Conus position, morphology, signal and enhancement are normal.  There is normal vertebral body height without evidence for fracture or  osseous edema. Mild discogenic endplate signal changes are shown at the  T10-11, T11-12 and L5-S1 levels, greatest at L5-S1. There is anatomic  alignment. No paraspinal soft tissue mass is shown. There are is a small  right paralumbar hernia containing fat and possibly a portion of colon. T10-11 T11-12 show mild disc space narrowing and central disc bulging  without facet arthropathy, canal stenosis or foraminal stenosis.     T12-L1 disc and facets are normal.    L1-2 disc and facets are normal.    L2-3 shows mild disc space narrowing and leftward lateralizing diffuse disc  bulging as well as mild bilateral facet arthropathy. There is no canal  stenosis. There is mild left foraminal narrowing. L3-4 shows nearly normal disc height with minimal diffuse disc bulging and  mild to moderate bilateral facet arthropathy. There is a borderline  appearance of canal stenosis and mild left foraminal narrowing. L4-5 disc height is normal. There is minimal disc bulging and mild bilateral  facet arthropathy. There is no canal or foraminal stenosis. L5-S1 shows severe disc space narrowing with mild central disc bulging and  bilateral facet arthropathy. There is no canal stenosis. There is mild  bilateral foraminal narrowing. Impression  :  1. Mild degenerative changes. 2. No fracture demonstrated. 3. Right paralumbar hernia. Signing/Reading Doctor: Nilda Toscano ([id number])  Approved: KIET Toscano ([id number])  Apr 12 2014  2:32PM      CT Results (most recent):  Results from Hospital Encounter encounter on 03/24/22    CT ABD PELV WO CONT    Narrative  EXAM: CT ABD PELV WO CONT    INDICATION: abd distension    COMPARISON: 2/20/2018    CONTRAST:  None. TECHNIQUE:  Thin axial images were obtained through the abdomen and pelvis. Coronal and  sagittal reformats were generated. Oral contrast was not administered. CT dose  reduction was achieved through use of a standardized protocol tailored for this  examination and automatic exposure control for dose modulation. The absence of intravenous contrast material reduces the sensitivity for  evaluation of the vasculature and solid organs. FINDINGS:  LOWER THORAX: Moderate bilateral pleural effusions. Large hiatal hernia. LIVER: No mass. BILIARY TREE: Cholecystectomy CBD is not dilated. SPLEEN: within normal limits. PANCREAS: No focal abnormality. ADRENALS: Calcified left adrenal gland  KIDNEYS/URETERS: Visualized kidneys are unremarkable  STOMACH: Unremarkable.   SMALL BOWEL: No dilatation or wall thickening. COLON: Colonic diverticulosis. No evidence of diverticulitis. Colon is  unremarkable. APPENDIX: Not visualized  PERITONEUM: No free fluid or free intraperitoneal air  RETROPERITONEUM: Incompletely imaged. Atherosclerotic disease. Otherwise  unremarkable  REPRODUCTIVE ORGANS: Unremarkable  URINARY BLADDER: No mass or calculus. BONES: Degenerative changes. ABDOMINAL WALL: No mass or hernia. ADDITIONAL COMMENTS: N/A    Impression  1. No acute intra-abdominal pathology. 2.  Bilateral pleural effusions and bilateral lower lobe volume loss. Moderate  size hiatal hernia. 3.  Calcified left adrenal gland which is likely secondary to prior trauma,  hemorrhage or inflammatory process      XR Results (most recent):  Results from Hospital Encounter encounter on 03/24/22    XR CHEST PORT    Narrative  EXAM: XR CHEST PORT    INDICATION: Dyspnea    COMPARISON: 6/20/2015. FINDINGS: A portable AP radiograph of the chest was obtained at 1244 hours. The  patient is on a cardiac monitor. There is now present mild cardiomegaly. There  are small pleural effusions. The lung volumes are low. Mild linear opacity at  the right base is consistent atelectasis. No pneumonia. No pulmonary edema. There is a small hiatal hernia. The patient is status post cervical spine surgery. Impression  1. There is mild cardiomegaly. There is blunting of the costophrenic angles  consistent with small pleural effusions. There is mild atelectasis at the right  base. No pneumonia.  No pulmonary edema          Past Medical History:   Diagnosis Date    Acute CHF (congestive heart failure) ([address] 3/25/2022    Arthritis     DJD    Autoimmune disease ([address]     psoriasis/others r/t autoimmune diseases - pt unsure of names    Cerebellar ataxia ([address]     previous hx \"cerebellar ataxia\"    Chronic pain     DDD/fibromyalgia    Fibromyalgia     fibromyalgia    GERD (gastroesophageal reflux disease)     History of degenerative disc disease     Hypercholesterolemia     Lexiscan 3-13-18 WNL EF 80% Cristofer    Hypertension     Liver disease     fatty liver    Orthostasis 2014    Primary hypertension 3/26/2022    Psychiatric disorder     depression/anxiety    Skin cancer     skin cancer - squamous and basal cell    Thyroid disease      Past Surgical History:   Procedure Laterality Date    COLONOSCOPY N/A 2019    COLONOS[facility]acle Diallo [facility] ENDOSCOPY    HX [address] HX BUNIONECTOMY Bilateral     HX CATARACT REMOVAL Bilateral     HX CERVICAL FUSION      HX CHOLECYSTECTOMY  2005    HX GI      colonoscopy/EGD    HX HEENT Right 1972    surgery for torn retina     HX HYSTERECTOMY      HX ORTHOPAEDIC  1972    left knee surgery - for floating knee cap    HX ORTHOPAEDIC  1976    left knee cap removed    HX ORTHOPAEDIC Left     3 knee surgeries - 5358/9638/4617 - spurs removed    HX OTHER SURGICAL      numerous skin cancers removed    HX PARTIAL THYROIDECTOMY      benign tumor    HX RETINAL DETACHMENT REPAIR Right     sclera buckle     Social History     Tobacco Use    Smoking status: Former Smoker     Quit date: 2008     Years since quittin.2    Smokeless tobacco: Never Used    Tobacco comment: quit smoking cigarettes   Substance Use Topics    Alcohol use: No    Drug use: No     Family History   Problem Relation Age of Onset    Heart Surgery Brother         mitral valve replacement    Other Brother         non-Hodgkins lymphoma    Stroke Brother     Hypertension Mother     Elevated Lipids Mother     Anesth Problems Mother         \"sleeps forever after\"/\"hungover\"    Heart Failure Father     Other Father         pacemaker    Heart Disease Father     Heart Attack Nephew 55     Allergies   Allergen Reactions    Latex Other (comments)     Excoriation of skin    Latex Rash    Adhesive Other (comments)     Excoriation of skin    Adhesive Rash     Excoriates the skin.          Visit Vitals  BP (!) 150/90   Pulse 68   Resp 16   Ht 5' 6\" (1.676 m)   Wt 148 lb (67.1 kg)   SpO2 95%   BMI 23.89 kg/m²         Last 3 Recorded Weights in this Encounter    04/27/22 1452   Weight: 148 lb (67.1 kg)            Review of Systems:   Pertinent items are noted in the History of Present Illness. Neck: no JVD  Heart: RRR, 1/6 MELANY   Lungs: CTA bilaterally   Abdomen: soft, non-tender   Extremities: no edema      Current Outpatient Medications on File Prior to Visit   Medication Sig Dispense Refill    carvediloL (COREG) 6.25 mg tablet Take 1 Tablet by mouth two (2) times daily (with meals). 30 Tablet 1    FLUoxetine (PROzac) 40 mg capsule Take 80 mg by mouth daily.  LORazepam (ATIVAN) 1 mg tablet Take  by mouth nightly as needed for Anxiety.  atorvastatin (LIPITOR) 20 mg tablet Take 20 mg by mouth nightly.  omeprazole (PRILOSEC) 40 mg capsule Take 40 mg by mouth daily.  Cholecalciferol, Vitamin D3, (VITAMIN D3) 2,000 unit cap capsule Take 2,000 Units by mouth daily. 0    cyanocobalamin (VITAMIN B-12) 1,000 mcg tablet Take 1 Tab by mouth two (2) times a day. 60 Each 0    potassium chloride (K-DUR, KLOR-CON) 20 mEq tablet Take 20 mEq by mouth daily. No current facility-administered medications on file prior to visit. Abbi Canada had no medications administered during this visit. Current Outpatient Medications   Medication Sig    aspirin delayed-release 81 mg tablet Take 1 Tablet by mouth every other day.  amiodarone (CORDARONE) 200 mg tablet Take 1 Tablet by mouth daily.  losartan (COZAAR) 50 mg tablet Take 1 Tablet by mouth daily.  apixaban (ELIQUIS) 5 mg tablet Take 1 Tablet by mouth every twelve (12) hours.  carvediloL (COREG) 6.25 mg tablet Take 1 Tablet by mouth two (2) times daily (with meals).  FLUoxetine (PROzac) 40 mg capsule Take 80 mg by mouth daily.     LORazepam (ATIVAN) 1 mg tablet Take  by mouth nightly as needed for Anxiety.  atorvastatin (LIPITOR) 20 mg tablet Take 20 mg by mouth nightly.  omeprazole (PRILOSEC) 40 mg capsule Take 40 mg by mouth daily.  Cholecalciferol, Vitamin D3, (VITAMIN D3) 2,000 unit cap capsule Take 2,000 Units by mouth daily.  cyanocobalamin (VITAMIN B-12) 1,000 mcg tablet Take 1 Tab by mouth two (2) times a day.  potassium chloride (K-DUR, KLOR-CON) 20 mEq tablet Take 20 mEq by mouth daily. No current facility-administered medications for this visit. Lab Results   Component Value Date/Time    Cholesterol, total 182 02/18/2016 04:05 PM    HDL Cholesterol 78 02/18/2016 04:05 PM    LDL, calculated 83 02/18/2016 04:05 PM    VLDL, calculated 21 02/18/2016 04:05 PM    Triglyceride 106 02/18/2016 04:05 PM    CHOL/HDL Ratio 2.4 05/13/2014 04:15 AM       Lab Results   Component Value Date/Time    Sodium 134 (L) 03/28/2022 05:29 AM    Potassium 4.4 03/28/2022 05:29 AM    Chloride 105 03/28/2022 05:29 AM    CO2 22 03/28/2022 05:29 AM    Anion gap 7 03/28/2022 05:29 AM    Glucose 110 (H) 03/28/2022 05:29 AM    BUN 19 03/28/2022 05:29 AM    Creatinine 1.04 (H) 03/28/2022 05:29 AM    BUN/Creatinine ratio 18 03/28/2022 05:29 AM    GFR est AA >60 03/28/2022 05:29 AM    GFR est non-AA 51 (L) 03/28/2022 05:29 AM    Calcium 8.5 03/28/2022 05:29 AM       Lab Results   Component Value Date/Time    ALT (SGPT) 13 03/25/2022 03:50 AM    Alk.  phosphatase 75 03/25/2022 03:50 AM    Bilirubin, total 1.4 (H) 03/25/2022 03:50 AM       Lab Results   Component Value Date/Time    WBC 6.9 03/25/2022 03:50 AM    HGB 11.2 (L) 03/25/2022 03:50 AM    HCT 35.8 03/25/2022 03:50 AM    PLATELET 363 15/23/0268 03:50 AM    MCV 81.9 03/25/2022 03:50 AM       Lab Results   Component Value Date/Time    TSH 1.02 03/25/2022 03:50 AM         Lab Results   Component Value Date/Time    Cholesterol, total 182 02/18/2016 04:05 PM    Cholesterol, total 168 05/13/2014 04:15 AM    Cholesterol, total 146 04/04/2014 04:48 AM    HDL Cholesterol 78 02/18/2016 04:05 PM    HDL Cholesterol 71 05/13/2014 04:15 AM    HDL Cholesterol 58 04/04/2014 04:48 AM    LDL, calculated 83 02/18/2016 04:05 PM    LDL, calculated 76.8 05/13/2014 04:15 AM    LDL, calculated 71.2 04/04/2014 04:48 AM    Triglyceride 106 02/18/2016 04:05 PM    Triglyceride 101 05/13/2014 04:15 AM    Triglyceride 84 04/04/2014 04:48 AM    CHOL/HDL Ratio 2.4 05/13/2014 04:15 AM    CHOL/HDL Ratio 2.5 04/04/2014 04:48 AM      Isha Gaviria, ACNP, [address]  [facility]  [address] , [address] [address]  [address]  () [phone] ([facility]) [phone]

## 2022-04-27 ENCOUNTER — OFFICE VISIT (OUTPATIENT)
Dept: CARDIOLOGY CLINIC | Age: 77
End: 2022-04-27
Payer: MEDICARE

## 2022-04-27 VITALS
HEIGHT: 66 IN | DIASTOLIC BLOOD PRESSURE: 90 MMHG | RESPIRATION RATE: 16 BRPM | WEIGHT: 148 LBS | BODY MASS INDEX: 23.78 KG/M2 | HEART RATE: 68 BPM | OXYGEN SATURATION: 95 % | SYSTOLIC BLOOD PRESSURE: 150 MMHG

## 2022-04-27 DIAGNOSIS — I44.7 LBBB (LEFT BUNDLE BRANCH BLOCK): ICD-10-CM

## 2022-04-27 DIAGNOSIS — I10 HTN (HYPERTENSION), BENIGN: ICD-10-CM

## 2022-04-27 DIAGNOSIS — R06.02 SHORTNESS OF BREATH: ICD-10-CM

## 2022-04-27 DIAGNOSIS — I48.0 PAROXYSMAL ATRIAL FIBRILLATION (HCC): ICD-10-CM

## 2022-04-27 DIAGNOSIS — Z79.01 CURRENT USE OF LONG TERM ANTICOAGULATION: ICD-10-CM

## 2022-04-27 DIAGNOSIS — E78.5 DYSLIPIDEMIA: ICD-10-CM

## 2022-04-27 DIAGNOSIS — I50.22 CHRONIC SYSTOLIC CONGESTIVE HEART FAILURE (HCC): Primary | ICD-10-CM

## 2022-04-27 DIAGNOSIS — R94.31 PROLONGED Q-T INTERVAL ON ECG: ICD-10-CM

## 2022-04-27 PROCEDURE — G8755 DIAS BP > OR = 90: HCPCS | Performed by: NURSE PRACTITIONER

## 2022-04-27 PROCEDURE — 1101F PT FALLS ASSESS-DOCD LE1/YR: CPT | Performed by: NURSE PRACTITIONER

## 2022-04-27 PROCEDURE — G8753 SYS BP > OR = 140: HCPCS | Performed by: NURSE PRACTITIONER

## 2022-04-27 PROCEDURE — 93000 ELECTROCARDIOGRAM COMPLETE: CPT | Performed by: NURSE PRACTITIONER

## 2022-04-27 PROCEDURE — 99214 OFFICE O/P EST MOD 30 MIN: CPT | Performed by: NURSE PRACTITIONER

## 2022-04-27 PROCEDURE — G8420 CALC BMI NORM PARAMETERS: HCPCS | Performed by: NURSE PRACTITIONER

## 2022-04-27 PROCEDURE — G8536 NO DOC ELDER MAL SCRN: HCPCS | Performed by: NURSE PRACTITIONER

## 2022-04-27 PROCEDURE — 1090F PRES/ABSN URINE INCON ASSESS: CPT | Performed by: NURSE PRACTITIONER

## 2022-04-27 PROCEDURE — 1111F DSCHRG MED/CURRENT MED MERGE: CPT | Performed by: NURSE PRACTITIONER

## 2022-04-27 PROCEDURE — G8427 DOCREV CUR MEDS BY ELIG CLIN: HCPCS | Performed by: NURSE PRACTITIONER

## 2022-04-27 PROCEDURE — G9717 DOC PT DX DEP/BP F/U NT REQ: HCPCS | Performed by: NURSE PRACTITIONER

## 2022-04-27 PROCEDURE — G8400 PT W/DXA NO RESULTS DOC: HCPCS | Performed by: NURSE PRACTITIONER

## 2022-04-27 RX ORDER — LOSARTAN POTASSIUM 50 MG/1
50 TABLET ORAL DAILY
Qty: 90 TABLET | Refills: 1 | Status: SHIPPED | OUTPATIENT
Start: 2022-04-27

## 2022-04-27 RX ORDER — AMIODARONE HYDROCHLORIDE 200 MG/1
200 TABLET ORAL DAILY
Qty: 90 TABLET | Refills: 1 | Status: SHIPPED | OUTPATIENT
Start: 2022-04-27 | End: 2022-06-20 | Stop reason: SINTOL

## 2022-04-27 RX ORDER — ASPIRIN 81 MG/1
81 TABLET ORAL EVERY OTHER DAY
Qty: 45 TABLET | Refills: 1 | Status: SHIPPED | OUTPATIENT
Start: 2022-04-27

## 2022-04-27 NOTE — PATIENT INSTRUCTIONS
Call your pharmacy to find out what your next copay for Eliquis will be and if it is too expensive please call our office    Please schedule an eye exam - you will need this annually     Please reduce your aspirin to every other day to reduce bruising     Please reduce your amiodarone to 200mg daily for your atrial fib    Please increase your losartan to 50mg daily for your blood pressure     While taking Amiodarone it is important that you:    -have blood work to check your liver and thyroid every 6 months  -have a yearly chest xray   -have a yearly eye examination    Please review the side effects of Amiodarone and contact your cardiologist if you begin to experience any of these side effects. You may get sunburned more easily while taking Amiodarone. Use sunscreen and wear clothing and eye protection that protects you from the sun while taking Amiodarone. Avoid direct sunlight without eye protection, sun lamps or tanning beds. If you experience a sudden change in your eyesight, eye pain or bright halos/lights please notify your cardiologist immediately. Please notify your cardiologist if you begin any new medications, herbals or over-the-counter supplements since Amiodarone can interact with a lot of other medications. Avoid grapefruit and grapefruit juice. If you experience diarrhea/loose stools or vomiting please try to remain hydrated. Dehydration can cause electrolyte imbalance.   Please notify your cardiologist if your symptoms persist.

## 2022-04-29 RX ORDER — CARVEDILOL 6.25 MG/1
6.25 TABLET ORAL 2 TIMES DAILY WITH MEALS
Qty: 180 TABLET | Refills: 1 | Status: SHIPPED | OUTPATIENT
Start: 2022-04-29 | End: 2022-06-20

## 2022-04-29 NOTE — TELEPHONE ENCOUNTER
Patient is requesting a refill, states she is almost out of this med        [facility] pharmacy   [phone]

## 2022-04-29 NOTE — TELEPHONE ENCOUNTER
Requested Prescriptions     Signed Prescriptions Disp Refills    carvediloL (COREG) 6.25 mg tablet 180 Tablet 1     Sig: Take 1 Tablet by mouth two (2) times daily (with meals). Authorizing Provider: Odilon Clayton     Ordering User: Cale Easton     Verbal order per Dr. Laura Trevino.     Future Appointments   Date Time Provider Fernie De La Cruz   5/20/2022  1:00 PM MD MARIN Pollard   6/20/2022  1:00 PM MD MARIN Pollard AMB

## 2022-06-20 ENCOUNTER — OFFICE VISIT (OUTPATIENT)
Dept: CARDIOLOGY CLINIC | Age: 77
End: 2022-06-20
Payer: MEDICARE

## 2022-06-20 VITALS
HEIGHT: 66 IN | DIASTOLIC BLOOD PRESSURE: 80 MMHG | HEART RATE: 62 BPM | SYSTOLIC BLOOD PRESSURE: 130 MMHG | RESPIRATION RATE: 16 BRPM | WEIGHT: 146 LBS | BODY MASS INDEX: 23.46 KG/M2 | OXYGEN SATURATION: 98 %

## 2022-06-20 DIAGNOSIS — I50.22 CHRONIC SYSTOLIC CONGESTIVE HEART FAILURE (HCC): Primary | ICD-10-CM

## 2022-06-20 DIAGNOSIS — I48.0 PAROXYSMAL ATRIAL FIBRILLATION (HCC): ICD-10-CM

## 2022-06-20 PROCEDURE — G8754 DIAS BP LESS 90: HCPCS | Performed by: SPECIALIST

## 2022-06-20 PROCEDURE — G8427 DOCREV CUR MEDS BY ELIG CLIN: HCPCS | Performed by: SPECIALIST

## 2022-06-20 PROCEDURE — G8420 CALC BMI NORM PARAMETERS: HCPCS | Performed by: SPECIALIST

## 2022-06-20 PROCEDURE — 1101F PT FALLS ASSESS-DOCD LE1/YR: CPT | Performed by: SPECIALIST

## 2022-06-20 PROCEDURE — 1123F ACP DISCUSS/DSCN MKR DOCD: CPT | Performed by: SPECIALIST

## 2022-06-20 PROCEDURE — G9717 DOC PT DX DEP/BP F/U NT REQ: HCPCS | Performed by: SPECIALIST

## 2022-06-20 PROCEDURE — 99214 OFFICE O/P EST MOD 30 MIN: CPT | Performed by: SPECIALIST

## 2022-06-20 PROCEDURE — 1090F PRES/ABSN URINE INCON ASSESS: CPT | Performed by: SPECIALIST

## 2022-06-20 PROCEDURE — G8536 NO DOC ELDER MAL SCRN: HCPCS | Performed by: SPECIALIST

## 2022-06-20 PROCEDURE — G8400 PT W/DXA NO RESULTS DOC: HCPCS | Performed by: SPECIALIST

## 2022-06-20 PROCEDURE — G8752 SYS BP LESS 140: HCPCS | Performed by: SPECIALIST

## 2022-06-20 RX ORDER — CARVEDILOL 12.5 MG/1
12.5 TABLET ORAL 2 TIMES DAILY WITH MEALS
Qty: 180 TABLET | Refills: 1 | Status: SHIPPED | OUTPATIENT
Start: 2022-06-20

## 2022-06-20 RX ORDER — PANTOPRAZOLE SODIUM 40 MG/1
TABLET, DELAYED RELEASE ORAL
COMMUNITY
Start: 2022-06-02

## 2022-06-20 NOTE — PROGRESS NOTES
Visit Vitals  /80   Pulse 62   Resp 16   Ht 5' 6\" (1.676 m)   Wt 146 lb (66.2 kg)   SpO2 98%   BMI 23.57 kg/m²

## 2022-06-20 NOTE — PATIENT INSTRUCTIONS
Please STOP Amiodarone  3 days after stopping Amiodarone please INCREASE your Carvedilol to 12.5mg twice a day  Follow up with Dr. Nico Cueva in about 6 week and have an echocardiogram the same day

## 2022-06-20 NOTE — PROGRESS NOTES
[address] [facility]                                                            OFFICE NOTE        Jil Edgar M.D.,BANDAR YARITZA RENDON   1945  [id number]    Date/Time:  6/20/202212:56 PM            SUBJECTIVE:  She is doing okay her shortness of breath is much better no chest pain reported. Unfortunately she is having some visual disturbances at times double vision which started after her hospital admission. Assessment/Plan    1. Atrial fibrillation: This is paroxysmal.  Last electrocardio monitor April 2022 with normal sinus rhythm. Physical examination today suggests the persistence of normal sinus rhythm. Continue with Eliquis no untoward effects thus far. Unfortunately the visual disturbances in my opinion are related to the amiodarone this will be stopped. 3 days after stopping amiodarone I will increase her Coreg to 12.5 mg twice a day. She is aware of potential recurrent atrial fibrillation of the amiodarone nonetheless. She will let me know if any palpitations. 2.  Cardiomyopathy: Last echocardiogram with the ejection fraction 41% in March 2022. It is felt that this was probably related to combination of atrial fibrillation with rapid ventricular response therefore tachycardia mediated cardiomyopathy and with underlying left bundle branch block. Continue with Coreg and losartan she seems to be clinically compensated at this time. Obtain limited echocardiogram the next office visit. To be noted that in March 2022 she is also undergone a nuclear stress test which has failed to reveal any significant ischemia. Otherwise I will see her back in approximately 6 weeks.         HPI   68-year-old female with the past medical history remarkable for hyperlipidemia gastroesophageal reflux disease depression hypertension who presented for cardiac evaluation of shortness of breath.     To be noted that the shortness of breath is been ongoing for the last 5 to 6 months when the last week has become very severe.     Also be noted the patient has stopped taking all of her medication 5 to 6 months ago on her own accord              CARDIAC STUDIES        03/24/22    ECHO ADULT COMPLETE 03/27/2022 3/27/2022    Interpretation Summary    Left Ventricle: Left ventricle size is normal. Normal wall thickness. Mild global hypokinesis present. Mildly reduced left ventricular systolic function. EF by 2D [employer]psons Biplane is 41%.   Right Ventricle: Moderately reduced systolic function. TAPSE is abnormal. TAPSE is 1.3 cm.   Tricuspid Valve: Moderate transvalvular regurgitation. Signed by: Nick Cai MD on 3/27/2022  9:45 PM            03/24/22    NUCLEAR CARDIAC STRESS TEST 03/25/2022, 03/27/2022 3/27/2022    Interpretation Summary    ECG: Resting ECG demonstrates atrial fibrillation.   Stress Test: A pharmacological stress test was performed using lexiscan. The patient reported no chest pain during the stress test.    INDICATION: Atrial fibrillation. History of hypertension    COMPARISON:  None. CORRELATIVE IMAGING STUDIES:  None    TRACER: Tc 99m Sestamibi    TECHNIQUE:  Resting SPECT images of the heart were obtained following the uneventful intravenous administration of 10.3 mCi of Tc 99m Sestamibi. Gated stress SPECT images of the heart were obtained following Lexiscan protocol and the uneventful intravenous administration of 32.5 mCi of Tc 99m Sestamibi. FINDINGS:  The rest and stress perfusion images demonstrate no significant perfusion defect or evidence of myocardial reversibility. The gated images demonstrate global hypokinesia. Left ventricular ejection fraction is 42 %. Impression:  No evidence of myocardial ischemia or infarction. Left ventricular ejection fraction is 42 %. Global hypokinesia.     Signed by: Gloria Smith MD on 3/25/2022 3:07 PM, Signed by: Sheria Gottron, MD on 3/27/2022 11:00 PM                    EKG Results     None              IMAGING      MRI Results (most recent):  Results from Hospital Encounter encounter on 04/10/14    MRI LUMB SPINE W WO CONT    Narrative  **Final Report**      ICD Codes / Adm. Diagnosis: 781.3  E888.9 / Lack of coordination  Unspecified  fall  Examination:  MR HERB Kwok AND ROMERO CON  - [id number] - Apr 12 2014  1:46PM  Accession No:  [id number]  Reason:  muliptle falls      REPORT:  INDICATION: Recent falls    COMPARISON: None    EXAM: Sagittal T1-weighted spin-echo, sagittal T2-weighted fast spin-echo,  sagittal inversion recovery, axial T1-weighted spin-echo, axial T2-weighted  fast spin-echo, and post IV-contrast enhanced sagittal and axial  fat-suppressed T1-weighted spin echo MR images of the lumbar spine are  obtained. A total of 5.5 cc Gadavist was administered for the study. FINDINGS: Conus position, morphology, signal and enhancement are normal.  There is normal vertebral body height without evidence for fracture or  osseous edema. Mild discogenic endplate signal changes are shown at the  T10-11, T11-12 and L5-S1 levels, greatest at L5-S1. There is anatomic  alignment. No paraspinal soft tissue mass is shown. There are is a small  right paralumbar hernia containing fat and possibly a portion of colon. T10-11 T11-12 show mild disc space narrowing and central disc bulging  without facet arthropathy, canal stenosis or foraminal stenosis. T12-L1 disc and facets are normal.    L1-2 disc and facets are normal.    L2-3 shows mild disc space narrowing and leftward lateralizing diffuse disc  bulging as well as mild bilateral facet arthropathy. There is no canal  stenosis. There is mild left foraminal narrowing. L3-4 shows nearly normal disc height with minimal diffuse disc bulging and  mild to moderate bilateral facet arthropathy.  There is a borderline  appearance of canal stenosis and mild left foraminal narrowing. L4-5 disc height is normal. There is minimal disc bulging and mild bilateral  facet arthropathy. There is no canal or foraminal stenosis. L5-S1 shows severe disc space narrowing with mild central disc bulging and  bilateral facet arthropathy. There is no canal stenosis. There is mild  bilateral foraminal narrowing. Impression  :  1. Mild degenerative changes. 2. No fracture demonstrated. 3. Right paralumbar hernia. Signing/Reading Doctor: Edward Curtis ([id number])  Approved: KIET Curtis ([id number])  Apr 12 2014  2:32PM      CT Results (most recent):  Results from Hospital Encounter encounter on 03/24/22    CT ABD PELV WO CONT    Narrative  EXAM: CT ABD PELV WO CONT    INDICATION: abd distension    COMPARISON: 2/20/2018    CONTRAST:  None. TECHNIQUE:  Thin axial images were obtained through the abdomen and pelvis. Coronal and  sagittal reformats were generated. Oral contrast was not administered. CT dose  reduction was achieved through use of a standardized protocol tailored for this  examination and automatic exposure control for dose modulation. The absence of intravenous contrast material reduces the sensitivity for  evaluation of the vasculature and solid organs. FINDINGS:  LOWER THORAX: Moderate bilateral pleural effusions. Large hiatal hernia. LIVER: No mass. BILIARY TREE: Cholecystectomy CBD is not dilated. SPLEEN: within normal limits. PANCREAS: No focal abnormality. ADRENALS: Calcified left adrenal gland  KIDNEYS/URETERS: Visualized kidneys are unremarkable  STOMACH: Unremarkable. SMALL BOWEL: No dilatation or wall thickening. COLON: Colonic diverticulosis. No evidence of diverticulitis. Colon is  unremarkable. APPENDIX: Not visualized  PERITONEUM: No free fluid or free intraperitoneal air  RETROPERITONEUM: Incompletely imaged. Atherosclerotic disease.  Otherwise  unremarkable  REPRODUCTIVE ORGANS: Unremarkable  URINARY BLADDER: No mass or calculus. BONES: Degenerative changes. ABDOMINAL WALL: No mass or hernia. ADDITIONAL COMMENTS: N/A    Impression  1. No acute intra-abdominal pathology. 2.  Bilateral pleural effusions and bilateral lower lobe volume loss. Moderate  size hiatal hernia. 3.  Calcified left adrenal gland which is likely secondary to prior trauma,  hemorrhage or inflammatory process      XR Results (most recent):  Results from Hospital Encounter encounter on 03/24/22    XR CHEST PORT    Narrative  EXAM: XR CHEST PORT    INDICATION: Dyspnea    COMPARISON: 6/20/2015. FINDINGS: A portable AP radiograph of the chest was obtained at 1244 hours. The  patient is on a cardiac monitor. There is now present mild cardiomegaly. There  are small pleural effusions. The lung volumes are low. Mild linear opacity at  the right base is consistent atelectasis. No pneumonia. No pulmonary edema. There is a small hiatal hernia. The patient is status post cervical spine surgery. Impression  1. There is mild cardiomegaly. There is blunting of the costophrenic angles  consistent with small pleural effusions. There is mild atelectasis at the right  base. No pneumonia.  No pulmonary edema          Past Medical History:   Diagnosis Date    Acute CHF (congestive heart failure) ([address] 3/25/2022    Arthritis     DJD    Autoimmune disease ([address]     psoriasis/others r/t autoimmune diseases - pt unsure of names    Cerebellar ataxia ([address]     previous hx \"cerebellar ataxia\"    Chronic pain     DDD/fibromyalgia    Fibromyalgia     fibromyalgia    GERD (gastroesophageal reflux disease)     History of degenerative disc disease     Hypercholesterolemia     Lexiscan 3-13-18 WNL EF 80% [facility]    Hypertension     Liver disease     fatty liver    Orthostasis 5/13/2014    Primary hypertension 3/26/2022    Psychiatric disorder     depression/anxiety    Skin cancer     skin cancer - squamous and basal cell    Thyroid disease      Past Surgical History:   Procedure Laterality Date    COLONOSCOPY N/A 2019    COLONOSCOPY performed by [facility]aceli Matute [facility] at [facility] ENDOSCOPY     [address] HX BUNIONECTOMY Bilateral     HX CATARACT REMOVAL Bilateral     HX CERVICAL FUSION      HX CHOLECYSTECTOMY  2005    HX GI      colonoscopy/EGD    HX HEENT Right 1972    surgery for torn retina     HX HYSTERECTOMY      HX ORTHOPAEDIC  1972    left knee surgery - for floating knee cap    HX ORTHOPAEDIC  1976    left knee cap removed    HX ORTHOPAEDIC Left     3 knee surgeries - [id number] - spurs removed    HX OTHER SURGICAL      numerous skin cancers removed    HX PARTIAL THYROIDECTOMY      benign tumor    HX RETINAL DETACHMENT REPAIR Right     sclera buckle     Social History     Tobacco Use    Smoking status: Former Smoker     Quit date: 2008     Years since quittin.4    Smokeless tobacco: Never Used    Tobacco comment: quit smoking cigarettes   Substance Use Topics    Alcohol use: No    Drug use: No     Family History   Problem Relation Age of Onset    Heart Surgery Brother         mitral valve replacement    Other Brother         non-Hodgkins lymphoma    Stroke Brother     Hypertension Mother     Elevated Lipids Mother     Anesth Problems Mother         \"sleeps forever after\"/\"hungover\"    Heart Failure Father     Other Father         pacemaker    Heart Disease Father     Heart Attack Nephew 55     Allergies   Allergen Reactions    Latex Other (comments)     Excoriation of skin    Latex Rash    Adhesive Other (comments)     Excoriation of skin    Adhesive Rash     Excoriates the skin. There were no vitals taken for this visit. There were no vitals filed for this visit. Review of Systems:   Pertinent items are noted in the History of Present Illness.        Visit Vitals  /80   Pulse 62   Resp 16   Ht 5' 6\" (1.676 m)   Wt 146 lb (66.2 kg)   SpO2 98%   BMI 23.57 kg/m² General Appearance:  Well developed, well nourished,alert and oriented x 3, and individual in no acute distress. Ears/Nose/Mouth/Throat:   Hearing grossly normal.         Neck: Supple. Chest:   Lungs clear to auscultation bilaterally. Cardiovascular:  Regular rate and rhythm, S1, S2 normal, no murmur. Abdomen:   Soft, non-tender, bowel sounds are active. Extremities: No edema bilaterally. Skin: Warm and dry. Current Outpatient Medications on File Prior to Visit   Medication Sig Dispense Refill    carvediloL (COREG) 6.25 mg tablet Take 1 Tablet by mouth two (2) times daily (with meals). 180 Tablet 1    aspirin delayed-release 81 mg tablet Take 1 Tablet by mouth every other day. 45 Tablet 1    amiodarone (CORDARONE) 200 mg tablet Take 1 Tablet by mouth daily. 90 Tablet 1    losartan (COZAAR) 50 mg tablet Take 1 Tablet by mouth daily. 90 Tablet 1    apixaban (ELIQUIS) 5 mg tablet Take 1 Tablet by mouth every twelve (12) hours. 56 Tablet 0    FLUoxetine (PROzac) 40 mg capsule Take 80 mg by mouth daily.  LORazepam (ATIVAN) 1 mg tablet Take  by mouth nightly as needed for Anxiety.  atorvastatin (LIPITOR) 20 mg tablet Take 20 mg by mouth nightly.  omeprazole (PRILOSEC) 40 mg capsule Take 40 mg by mouth daily.  Cholecalciferol, Vitamin D3, (VITAMIN D3) 2,000 unit cap capsule Take 2,000 Units by mouth daily. 0    cyanocobalamin (VITAMIN B-12) 1,000 mcg tablet Take 1 Tab by mouth two (2) times a day. 60 Each 0    potassium chloride (K-DUR, KLOR-CON) 20 mEq tablet Take 20 mEq by mouth daily. No current facility-administered medications on file prior to visit. Niko Lentz had no medications administered during this visit. Current Outpatient Medications   Medication Sig    carvediloL (COREG) 6.25 mg tablet Take 1 Tablet by mouth two (2) times daily (with meals).  aspirin delayed-release 81 mg tablet Take 1 Tablet by mouth every other day.     amiodarone (CORDARONE) 200 mg tablet Take 1 Tablet by mouth daily.  losartan (COZAAR) 50 mg tablet Take 1 Tablet by mouth daily.  apixaban (ELIQUIS) 5 mg tablet Take 1 Tablet by mouth every twelve (12) hours.  FLUoxetine (PROzac) 40 mg capsule Take 80 mg by mouth daily.  LORazepam (ATIVAN) 1 mg tablet Take  by mouth nightly as needed for Anxiety.  atorvastatin (LIPITOR) 20 mg tablet Take 20 mg by mouth nightly.  omeprazole (PRILOSEC) 40 mg capsule Take 40 mg by mouth daily.  Cholecalciferol, Vitamin D3, (VITAMIN D3) 2,000 unit cap capsule Take 2,000 Units by mouth daily.  cyanocobalamin (VITAMIN B-12) 1,000 mcg tablet Take 1 Tab by mouth two (2) times a day.  potassium chloride (K-DUR, KLOR-CON) 20 mEq tablet Take 20 mEq by mouth daily. No current facility-administered medications for this visit. Lab Results   Component Value Date/Time    Cholesterol, total 182 02/18/2016 04:05 PM    HDL Cholesterol 78 02/18/2016 04:05 PM    LDL, calculated 83 02/18/2016 04:05 PM    VLDL, calculated 21 02/18/2016 04:05 PM    Triglyceride 106 02/18/2016 04:05 PM    CHOL/HDL Ratio 2.4 05/13/2014 04:15 AM       Lab Results   Component Value Date/Time    Sodium 134 (L) 03/28/2022 05:29 AM    Potassium 4.4 03/28/2022 05:29 AM    Chloride 105 03/28/2022 05:29 AM    CO2 22 03/28/2022 05:29 AM    Anion gap 7 03/28/2022 05:29 AM    Glucose 110 (H) 03/28/2022 05:29 AM    BUN 19 03/28/2022 05:29 AM    Creatinine 1.04 (H) 03/28/2022 05:29 AM    BUN/Creatinine ratio 18 03/28/2022 05:29 AM    GFR est AA >60 03/28/2022 05:29 AM    GFR est non-AA 51 (L) 03/28/2022 05:29 AM    Calcium 8.5 03/28/2022 05:29 AM       Lab Results   Component Value Date/Time    ALT (SGPT) 13 03/25/2022 03:50 AM    Alk.  phosphatase 75 03/25/2022 03:50 AM    Bilirubin, total 1.4 (H) 03/25/2022 03:50 AM       Lab Results   Component Value Date/Time    WBC 6.9 03/25/2022 03:50 AM    HGB 11.2 (L) 03/25/2022 03:50 AM    HCT 35.8 03/25/2022 03:50 AM    PLATELET 819 88/40/1446 03:50 AM    MCV 81.9 03/25/2022 03:50 AM       Lab Results   Component Value Date/Time    TSH 1.02 03/25/2022 03:50 AM         Lab Results   Component Value Date/Time    Cholesterol, total 182 02/18/2016 04:05 PM    Cholesterol, total 168 05/13/2014 04:15 AM    Cholesterol, total 146 04/04/2014 04:48 AM    HDL Cholesterol 78 02/18/2016 04:05 PM    HDL Cholesterol 71 05/13/2014 04:15 AM    HDL Cholesterol 58 04/04/2014 04:48 AM    LDL, calculated 83 02/18/2016 04:05 PM    LDL, calculated 76.8 05/13/2014 04:15 AM    LDL, calculated 71.2 04/04/2014 04:48 AM    Triglyceride 106 02/18/2016 04:05 PM    Triglyceride 101 05/13/2014 04:15 AM    Triglyceride 84 04/04/2014 04:48 AM    CHOL/HDL Ratio 2.4 05/13/2014 04:15 AM    CHOL/HDL Ratio 2.5 04/04/2014 04:48 AM                Please note that this dictation was completed with [employer], the [employer] technology voice recognition software. Quite often unanticipated grammatical, syntax, homophones, and other interpretative errors are inadvertently transcribed by the computer software. Please disregard these errors. Please excuse any errors that have escaped final proofreading.

## 2022-08-09 ENCOUNTER — OFFICE VISIT (OUTPATIENT)
Dept: CARDIOLOGY CLINIC | Age: 77
End: 2022-08-09

## 2022-08-09 ENCOUNTER — ANCILLARY PROCEDURE (OUTPATIENT)
Dept: CARDIOLOGY CLINIC | Age: 77
End: 2022-08-09
Payer: MEDICARE

## 2022-08-09 VITALS
BODY MASS INDEX: 23.3 KG/M2 | OXYGEN SATURATION: 97 % | SYSTOLIC BLOOD PRESSURE: 130 MMHG | HEIGHT: 66 IN | HEART RATE: 57 BPM | WEIGHT: 145 LBS | RESPIRATION RATE: 14 BRPM | DIASTOLIC BLOOD PRESSURE: 80 MMHG

## 2022-08-09 VITALS — BODY MASS INDEX: 23.3 KG/M2 | WEIGHT: 145 LBS | HEIGHT: 66 IN

## 2022-08-09 DIAGNOSIS — R94.31 ABNORMAL EKG: Primary | ICD-10-CM

## 2022-08-09 DIAGNOSIS — I48.0 PAROXYSMAL ATRIAL FIBRILLATION (HCC): ICD-10-CM

## 2022-08-09 DIAGNOSIS — I10 PRIMARY HYPERTENSION: ICD-10-CM

## 2022-08-09 LAB
ECHO AO ASC DIAM: 3.3 CM
ECHO AO ASCENDING AORTA INDEX: 1.9 CM/M2
ECHO AO ROOT DIAM: 3.3 CM
ECHO AO ROOT INDEX: 1.9 CM/M2
ECHO AV AREA PEAK VELOCITY: 1.7 CM2
ECHO AV AREA VTI: 1.9 CM2
ECHO AV AREA/BSA PEAK VELOCITY: 1 CM2/M2
ECHO AV AREA/BSA VTI: 1.1 CM2/M2
ECHO AV MEAN GRADIENT: 4 MMHG
ECHO AV MEAN VELOCITY: 1 M/S
ECHO AV PEAK GRADIENT: 8 MMHG
ECHO AV PEAK VELOCITY: 1.4 M/S
ECHO AV VELOCITY RATIO: 0.43
ECHO AV VTI: 26 CM
ECHO LA DIAMETER INDEX: 2.3 CM/M2
ECHO LA DIAMETER: 4 CM
ECHO LA TO AORTIC ROOT RATIO: 1.21
ECHO LA VOL 2C: 83 ML (ref 22–52)
ECHO LA VOL 4C: 90 ML (ref 22–52)
ECHO LA VOL BP: 88 ML (ref 22–52)
ECHO LA VOL/BSA BIPLANE: 51 ML/M2 (ref 16–34)
ECHO LA VOLUME AREA LENGTH: 92 ML
ECHO LA VOLUME INDEX A2C: 48 ML/M2 (ref 16–34)
ECHO LA VOLUME INDEX A4C: 52 ML/M2 (ref 16–34)
ECHO LA VOLUME INDEX AREA LENGTH: 53 ML/M2 (ref 16–34)
ECHO LV E' LATERAL VELOCITY: 2 CM/S
ECHO LV E' SEPTAL VELOCITY: 4 CM/S
ECHO LV EDV A2C: 77 ML
ECHO LV EDV A4C: 99 ML
ECHO LV EDV BP: 87 ML (ref 56–104)
ECHO LV EDV INDEX A4C: 57 ML/M2
ECHO LV EDV INDEX BP: 50 ML/M2
ECHO LV EDV NDEX A2C: 44 ML/M2
ECHO LV EJECTION FRACTION A2C: 48 %
ECHO LV EJECTION FRACTION A4C: 51 %
ECHO LV EJECTION FRACTION BIPLANE: 49 % (ref 55–100)
ECHO LV ESV A2C: 40 ML
ECHO LV ESV A4C: 48 ML
ECHO LV ESV BP: 44 ML (ref 19–49)
ECHO LV ESV INDEX A2C: 23 ML/M2
ECHO LV ESV INDEX A4C: 28 ML/M2
ECHO LV ESV INDEX BP: 25 ML/M2
ECHO LV FRACTIONAL SHORTENING: 38 % (ref 28–44)
ECHO LV INTERNAL DIMENSION DIASTOLE INDEX: 2.41 CM/M2
ECHO LV INTERNAL DIMENSION DIASTOLIC: 4.2 CM (ref 3.9–5.3)
ECHO LV INTERNAL DIMENSION SYSTOLIC INDEX: 1.49 CM/M2
ECHO LV INTERNAL DIMENSION SYSTOLIC: 2.6 CM
ECHO LV IVSD: 1.6 CM (ref 0.6–0.9)
ECHO LV MASS 2D: 276.1 G (ref 67–162)
ECHO LV MASS INDEX 2D: 158.7 G/M2 (ref 43–95)
ECHO LV POSTERIOR WALL DIASTOLIC: 1.6 CM (ref 0.6–0.9)
ECHO LV RELATIVE WALL THICKNESS RATIO: 0.76
ECHO LVOT AREA: 4.2 CM2
ECHO LVOT AV VTI INDEX: 0.47
ECHO LVOT DIAM: 2.3 CM
ECHO LVOT MEAN GRADIENT: 1 MMHG
ECHO LVOT PEAK GRADIENT: 1 MMHG
ECHO LVOT PEAK VELOCITY: 0.6 M/S
ECHO LVOT STROKE VOLUME INDEX: 29.1 ML/M2
ECHO LVOT SV: 50.7 ML
ECHO LVOT VTI: 12.2 CM
ECHO MV A VELOCITY: 0.67 M/S
ECHO MV E DECELERATION TIME (DT): 218.4 MS
ECHO MV E VELOCITY: 0.73 M/S
ECHO MV E/A RATIO: 1.09
ECHO MV E/E' LATERAL: 36.5
ECHO MV E/E' RATIO (AVERAGED): 27.38
ECHO MV E/E' SEPTAL: 18.25
ECHO PULMONARY ARTERY END DIASTOLIC PRESSURE: 6 MMHG
ECHO PV MAX VELOCITY: 0.8 M/S
ECHO PV PEAK GRADIENT: 2 MMHG
ECHO PV REGURGITANT MAX VELOCITY: 1.2 M/S
ECHO TV REGURGITANT MAX VELOCITY: 2.45 M/S
ECHO TV REGURGITANT PEAK GRADIENT: 24 MMHG

## 2022-08-09 PROCEDURE — 93308 TTE F-UP OR LMTD: CPT | Performed by: SPECIALIST

## 2022-08-09 PROCEDURE — G8754 DIAS BP LESS 90: HCPCS | Performed by: SPECIALIST

## 2022-08-09 PROCEDURE — 1090F PRES/ABSN URINE INCON ASSESS: CPT | Performed by: SPECIALIST

## 2022-08-09 PROCEDURE — 1123F ACP DISCUSS/DSCN MKR DOCD: CPT | Performed by: SPECIALIST

## 2022-08-09 PROCEDURE — 93325 DOPPLER ECHO COLOR FLOW MAPG: CPT | Performed by: SPECIALIST

## 2022-08-09 PROCEDURE — G9717 DOC PT DX DEP/BP F/U NT REQ: HCPCS | Performed by: SPECIALIST

## 2022-08-09 PROCEDURE — G8752 SYS BP LESS 140: HCPCS | Performed by: SPECIALIST

## 2022-08-09 PROCEDURE — G8427 DOCREV CUR MEDS BY ELIG CLIN: HCPCS | Performed by: SPECIALIST

## 2022-08-09 PROCEDURE — 93321 DOPPLER ECHO F-UP/LMTD STD: CPT | Performed by: SPECIALIST

## 2022-08-09 PROCEDURE — G8536 NO DOC ELDER MAL SCRN: HCPCS | Performed by: SPECIALIST

## 2022-08-09 PROCEDURE — 99214 OFFICE O/P EST MOD 30 MIN: CPT | Performed by: SPECIALIST

## 2022-08-09 PROCEDURE — 1101F PT FALLS ASSESS-DOCD LE1/YR: CPT | Performed by: SPECIALIST

## 2022-08-09 PROCEDURE — G8400 PT W/DXA NO RESULTS DOC: HCPCS | Performed by: SPECIALIST

## 2022-08-09 PROCEDURE — G8420 CALC BMI NORM PARAMETERS: HCPCS | Performed by: SPECIALIST

## 2022-08-09 PROCEDURE — 93000 ELECTROCARDIOGRAM COMPLETE: CPT | Performed by: SPECIALIST

## 2022-08-09 RX ORDER — POTASSIUM CHLORIDE 750 MG/1
20 TABLET, FILM COATED, EXTENDED RELEASE ORAL DAILY
COMMUNITY

## 2022-08-09 NOTE — PROGRESS NOTES
[facility]                                                            OFFICE NOTE        Vitor Yung M.D.,BANDAR YARITZA RENDON   1945  [id number]    Date/Time:  8/9/20228:26 AM            SUBJECTIVE:  She is doing much better  Sob only with strenuous activities   No cp visual disturbances improved off amiodarone       Assessment/Plan  1. Atrial fibrillation: This is paroxysmal.   Ecg today with NSR NSTT and FAV     Continue with Eliquis no untoward effects thus far. Nonetheless occasional falls of concern  We have discussed watchman procedure he will let me know  Information given    Continue same dose of coreg     She is aware of potential recurrent atrial fibrillation of the amiodarone nonetheless. She will let me know if any palpitations. 2.  Cardiomyopathy: Last echocardiogram with the ejection fraction 41% in March 2022. It is felt that this was probably related to combination of atrial fibrillation with rapid ventricular response therefore tachycardia mediated cardiomyopathy and with underlying left bundle branch block. Echo today with normalization of EF ! 55-60%    Ecg with no LBBB any longer     Continue with Coreg and losartan she seems to be clinically compensated at this time. To be noted that in March 2022 she is also undergone a nuclear stress test which has failed to reveal any significant ischemia. 3. HLD: closely followed by his pcp    Otherwise I will see her back in approximately 6 months or sooner if any issues        HPI     78-year-old female with the past medical history remarkable for hyperlipidemia gastroesophageal reflux disease depression hypertension who presented for cardiac evaluation of shortness of breath. To be noted that the shortness of breath is been ongoing for the last 5 to 6 months when the last week has become very severe. Also be noted the patient has stopped taking all of her medication 5 to 6 months ago on her own accord               CARDIAC STUDIES        03/24/22    ECHO ADULT COMPLETE 03/27/2022 3/27/2022    Interpretation Summary  Formatting of this result is different from the original.      Left Ventricle: Left ventricle size is normal. Normal wall thickness. Mild global hypokinesis present. Mildly reduced left ventricular systolic function. EF by 2D Simpsons Biplane is 41%. Right Ventricle: Moderately reduced systolic function. TAPSE is abnormal. TAPSE is 1.3 cm. Tricuspid Valve: Moderate transvalvular regurgitation. Signed by: Nathaniel Diaz MD on 3/27/2022  9:45 PM            03/24/22    NUCLEAR CARDIAC STRESS TEST 03/25/2022, 03/27/2022 3/27/2022    Interpretation Summary  Formatting of this result is different from the original.      ECG: Resting ECG demonstrates atrial fibrillation. Stress Test: A pharmacological stress test was performed using lexiscan. The patient reported no chest pain during the stress test.    INDICATION: Atrial fibrillation. History of hypertension    COMPARISON:  None. CORRELATIVE IMAGING STUDIES:  None    TRACER: Tc 99m Sestamibi    TECHNIQUE:  Resting SPECT images of the heart were obtained following the uneventful intravenous administration of 10.3 mCi of Tc 99m Sestamibi. Gated stress SPECT images of the heart were obtained following Lexiscan protocol and the uneventful intravenous administration of 32.5 mCi of Tc 99m Sestamibi. FINDINGS:  The rest and stress perfusion images demonstrate no significant perfusion defect or evidence of myocardial reversibility. The gated images demonstrate global hypokinesia. Left ventricular ejection fraction is 42 %. Impression:  No evidence of myocardial ischemia or infarction. Left ventricular ejection fraction is 42 %. Global hypokinesia.     Signed by: Tonja Seaed MD on 3/25/2022  3:07 PM, Signed by: Nathaniel Diaz MD on 3/27/2022 11:00 PM                    EKG Results       None                IMAGING      MRI Results (most recent):  [facility]    MRI LUMB SPINE W WO CONT    Narrative  **Final Report**      ICD Codes / Adm. Diagnosis: 781.3  E888.9 / Lack of coordination  Unspecified  fall  Examination:  MR HERB [place] AND WO CON  - [id number] - Apr 12 2014  1:46PM  Accession No:  [id number]  Reason:  muliptle falls      REPORT:  INDICATION: Recent falls    COMPARISON: None    EXAM: Sagittal T1-weighted spin-echo, sagittal T2-weighted fast spin-echo,  sagittal inversion recovery, axial T1-weighted spin-echo, axial T2-weighted  fast spin-echo, and post IV-contrast enhanced sagittal and axial  fat-suppressed T1-weighted spin echo MR images of the lumbar spine are  obtained. A total of 5.5 cc Gadavist was administered for the study. FINDINGS: Conus position, morphology, signal and enhancement are normal.  There is normal vertebral body height without evidence for fracture or  osseous edema. Mild discogenic endplate signal changes are shown at the  T10-11, T11-12 and L5-S1 levels, greatest at L5-S1. There is anatomic  alignment. No paraspinal soft tissue mass is shown. There are is a small  right paralumbar hernia containing fat and possibly a portion of colon. T10-11 T11-12 show mild disc space narrowing and central disc bulging  without facet arthropathy, canal stenosis or foraminal stenosis. T12-L1 disc and facets are normal.    L1-2 disc and facets are normal.    L2-3 shows mild disc space narrowing and leftward lateralizing diffuse disc  bulging as well as mild bilateral facet arthropathy. There is no canal  stenosis. There is mild left foraminal narrowing. L3-4 shows nearly normal disc height with minimal diffuse disc bulging and  mild to moderate bilateral facet arthropathy. There is a borderline  appearance of canal stenosis and mild left foraminal narrowing.     L4-5 disc height is normal. There is minimal disc bulging and mild bilateral  facet arthropathy. There is no canal or foraminal stenosis. L5-S1 shows severe disc space narrowing with mild central disc bulging and  bilateral facet arthropathy. There is no canal stenosis. There is mild  bilateral foraminal narrowing. Impression  :  1. Mild degenerative changes. 2. No fracture demonstrated. 3. Right paralumbar hernia. Signing/Reading Doctor: Sophia Garvey ([id number])  Approved: KIET Garvey ([id number])  Apr 12 2014  2:32PM      CT Results (most recent):  Results from Hospital Encounter encounter on 03/24/22    CT ABD PELV WO CONT    Narrative  EXAM: CT ABD PELV WO CONT    INDICATION: abd distension    COMPARISON: 2/20/2018    CONTRAST:  None. TECHNIQUE:  Thin axial images were obtained through the abdomen and pelvis. Coronal and  sagittal reformats were generated. Oral contrast was not administered. CT dose  reduction was achieved through use of a standardized protocol tailored for this  examination and automatic exposure control for dose modulation. The absence of intravenous contrast material reduces the sensitivity for  evaluation of the vasculature and solid organs. FINDINGS:  LOWER THORAX: Moderate bilateral pleural effusions. Large hiatal hernia. LIVER: No mass. BILIARY TREE: Cholecystectomy CBD is not dilated. SPLEEN: within normal limits. PANCREAS: No focal abnormality. ADRENALS: Calcified left adrenal gland  KIDNEYS/URETERS: Visualized kidneys are unremarkable  STOMACH: Unremarkable. SMALL BOWEL: No dilatation or wall thickening. COLON: Colonic diverticulosis. No evidence of diverticulitis. Colon is  unremarkable. APPENDIX: Not visualized  PERITONEUM: No free fluid or free intraperitoneal air  RETROPERITONEUM: Incompletely imaged. Atherosclerotic disease. Otherwise  unremarkable  REPRODUCTIVE ORGANS: Unremarkable  URINARY BLADDER: No mass or calculus.   BONES: Degenerative changes. ABDOMINAL WALL: No mass or hernia. ADDITIONAL COMMENTS: N/A    Impression  1. No acute intra-abdominal pathology. 2.  Bilateral pleural effusions and bilateral lower lobe volume loss. Moderate  size hiatal hernia. 3.  Calcified left adrenal gland which is likely secondary to prior trauma,  hemorrhage or inflammatory process      XR Results (most recent):  Results from Hospital Encounter encounter on 03/24/22    XR CHEST PORT    Narrative  EXAM: XR CHEST PORT    INDICATION: Dyspnea    COMPARISON: 6/20/2015. FINDINGS: A portable AP radiograph of the chest was obtained at 1244 hours. The  patient is on a cardiac monitor. There is now present mild cardiomegaly. There  are small pleural effusions. The lung volumes are low. Mild linear opacity at  the right base is consistent atelectasis. No pneumonia. No pulmonary edema. There is a small hiatal hernia. The patient is status post cervical spine surgery. Impression  1. There is mild cardiomegaly. There is blunting of the costophrenic angles  consistent with small pleural effusions. There is mild atelectasis at the right  base. No pneumonia.  No pulmonary edema          Past Medical History:   Diagnosis Date    Acute CHF (congestive heart failure) ([address] 3/25/2022    Arthritis     DJD    Autoimmune disease ([address]     psoriasis/others r/t autoimmune diseases - pt unsure of names    Cerebellar ataxia ([address]     previous hx \"cerebellar ataxia\"    Chronic pain     DDD/fibromyalgia    Fibromyalgia     fibromyalgia    GERD (gastroesophageal reflux disease)     History of degenerative disc disease     Hypercholesterolemia     Lexiscan 3-13-18 WNL EF 80% [facility]    Hypertension     Liver disease     fatty liver    Orthostasis 5/13/2014    Primary hypertension 3/26/2022    Psychiatric disorder     depression/anxiety    Skin cancer     skin cancer - squamous and basal cell    Thyroid disease      Past Surgical History:   Procedure Laterality Date COLONOSCOPY N/A 2019    COLONOSCOPY performed by Karla Iverson MD at [facility] ENDOSCOPY    [address]    HX BUNIONECTOMY Bilateral     HX CATARACT REMOVAL Bilateral     HX CERVICAL FUSION      HX CHOLECYSTECTOMY  2005    HX GI      colonoscopy/EGD    HX HEENT Right 1972    surgery for torn retina     HX HYSTERECTOMY      HX ORTHOPAEDIC  1972    left knee surgery - for floating knee cap    HX ORTHOPAEDIC      left knee cap removed    HX ORTHOPAEDIC Left     3 knee surgeries - [id number]/[id number]/[id number] - spurs removed    HX OTHER SURGICAL      numerous skin cancers removed    HX PARTIAL THYROIDECTOMY      benign tumor    HX RETINAL DETACHMENT REPAIR Right     sclera buckle     Social History     Tobacco Use    Smoking status: Former     Types: Cigarettes     Quit date: 2008     Years since quittin.5    Smokeless tobacco: Never    Tobacco comments:     quit smoking cigarettes   Substance Use Topics    Alcohol use: No    Drug use: No     Family History   Problem Relation Age of Onset    Heart Surgery Brother         mitral valve replacement    Other Brother         non-Hodgkins lymphoma    Stroke Brother     Hypertension Mother     Elevated Lipids Mother     Anesth Problems Mother         \"sleeps forever after\"/\"hungover\"    Heart Failure Father     Other Father         pacemaker    Heart Disease Father     Heart Attack Nephew 55     Allergies   Allergen Reactions    Latex Other (comments)     Excoriation of skin    Latex Rash    Adhesive Other (comments)     Excoriation of skin    Adhesive Rash     Excoriates the skin. There were no vitals taken for this visit. There were no vitals filed for this visit. Review of Systems:   Pertinent items are noted in the History of Present Illness.        Visit Vitals  /80 (BP 1 Location: Right arm, BP Patient Position: Sitting, BP Cuff Size: Adult)   Pulse (!) 57   Resp 14   Ht 5' 6\" (1.676 m)   Wt 145 lb (65.8 kg)   SpO2 97%   BMI 23.40 kg/m²     General Appearance:  Well developed, well nourished,alert and oriented x 3, and individual in no acute distress. Ears/Nose/Mouth/Throat:   Hearing grossly normal.         Neck: Supple. Chest:   Lungs clear to auscultation bilaterally. Cardiovascular:  Regular rate and rhythm, S1, S2 normal, no murmur. Abdomen:   Soft, non-tender, bowel sounds are active. Extremities: No edema bilaterally. Skin: Warm and dry. Current Outpatient Medications on File Prior to Visit   Medication Sig Dispense Refill    pantoprazole (PROTONIX) 40 mg tablet TAKE 1 TABLET BY MOUTH EVERY DAY FOR 90 DAYS      apixaban (ELIQUIS) 5 mg tablet Take 1 Tablet by mouth every twelve (12) hours. 42 Tablet 0    carvediloL (COREG) 12.5 mg tablet Take 1 Tablet by mouth two (2) times daily (with meals). 180 Tablet 1    aspirin delayed-release 81 mg tablet Take 1 Tablet by mouth every other day. 45 Tablet 1    losartan (COZAAR) 50 mg tablet Take 1 Tablet by mouth daily. 90 Tablet 1    FLUoxetine (PROzac) 40 mg capsule Take 80 mg by mouth daily. LORazepam (ATIVAN) 1 mg tablet Take  by mouth nightly as needed for Anxiety. atorvastatin (LIPITOR) 20 mg tablet Take 20 mg by mouth nightly. omeprazole (PRILOSEC) 40 mg capsule Take 40 mg by mouth daily. Cholecalciferol, Vitamin D3, (VITAMIN D3) 2,000 unit cap capsule Take 2,000 Units by mouth daily. 0    cyanocobalamin (VITAMIN B-12) 1,000 mcg tablet Take 1 Tab by mouth two (2) times a day. 60 Each 0    potassium chloride (K-DUR, KLOR-CON) 20 mEq tablet Take 20 mEq by mouth daily. No current facility-administered medications on file prior to visit. Naoma Peers had no medications administered during this visit. Current Outpatient Medications   Medication Sig    pantoprazole (PROTONIX) 40 mg tablet TAKE 1 TABLET BY MOUTH EVERY DAY FOR 90 DAYS    apixaban (ELIQUIS) 5 mg tablet Take 1 Tablet by mouth every twelve (12) hours. carvediloL (COREG) 12.5 mg tablet Take 1 Tablet by mouth two (2) times daily (with meals). aspirin delayed-release 81 mg tablet Take 1 Tablet by mouth every other day. losartan (COZAAR) 50 mg tablet Take 1 Tablet by mouth daily. FLUoxetine (PROzac) 40 mg capsule Take 80 mg by mouth daily. LORazepam (ATIVAN) 1 mg tablet Take  by mouth nightly as needed for Anxiety. atorvastatin (LIPITOR) 20 mg tablet Take 20 mg by mouth nightly. omeprazole (PRILOSEC) 40 mg capsule Take 40 mg by mouth daily. Cholecalciferol, Vitamin D3, (VITAMIN D3) 2,000 unit cap capsule Take 2,000 Units by mouth daily. cyanocobalamin (VITAMIN B-12) 1,000 mcg tablet Take 1 Tab by mouth two (2) times a day. potassium chloride (K-DUR, KLOR-CON) 20 mEq tablet Take 20 mEq by mouth daily. No current facility-administered medications for this visit. Lab Results   Component Value Date/Time    Cholesterol, total 182 02/18/2016 04:05 PM    HDL Cholesterol 78 02/18/2016 04:05 PM    LDL, calculated 83 02/18/2016 04:05 PM    VLDL, calculated 21 02/18/2016 04:05 PM    Triglyceride 106 02/18/2016 04:05 PM    CHOL/HDL Ratio 2.4 05/13/2014 04:15 AM       Lab Results   Component Value Date/Time    Sodium 134 (L) 03/28/2022 05:29 AM    Potassium 4.4 03/28/2022 05:29 AM    Chloride 105 03/28/2022 05:29 AM    CO2 22 03/28/2022 05:29 AM    Anion gap 7 03/28/2022 05:29 AM    Glucose 110 (H) 03/28/2022 05:29 AM    BUN 19 03/28/2022 05:29 AM    Creatinine 1.04 (H) 03/28/2022 05:29 AM    BUN/Creatinine ratio 18 03/28/2022 05:29 AM    GFR est AA >60 03/28/2022 05:29 AM    GFR est non-AA 51 (L) 03/28/2022 05:29 AM    Calcium 8.5 03/28/2022 05:29 AM       Lab Results   Component Value Date/Time    ALT (SGPT) 13 03/25/2022 03:50 AM    Alk.  phosphatase 75 03/25/2022 03:50 AM    Bilirubin, total 1.4 (H) 03/25/2022 03:50 AM       Lab Results   Component Value Date/Time    WBC 6.9 03/25/2022 03:50 AM    HGB 11.2 (L) 03/25/2022 03:50 AM    HCT 35.8 03/25/2022 03:50 AM    PLATELET 362 30/87/6780 03:50 AM    MCV 81.9 03/25/2022 03:50 AM       Lab Results   Component Value Date/Time    TSH 1.02 03/25/2022 03:50 AM         Lab Results   Component Value Date/Time    Cholesterol, total 182 02/18/2016 04:05 PM    Cholesterol, total 168 05/13/2014 04:15 AM    Cholesterol, total 146 04/04/2014 04:48 AM    HDL Cholesterol 78 02/18/2016 04:05 PM    HDL Cholesterol 71 05/13/2014 04:15 AM    HDL Cholesterol 58 04/04/2014 04:48 AM    LDL, calculated 83 02/18/2016 04:05 PM    LDL, calculated 76.8 05/13/2014 04:15 AM    LDL, calculated 71.2 04/04/2014 04:48 AM    Triglyceride 106 02/18/2016 04:05 PM    Triglyceride 101 05/13/2014 04:15 AM    Triglyceride 84 04/04/2014 04:48 AM    CHOL/HDL Ratio 2.4 05/13/2014 04:15 AM    CHOL/HDL Ratio 2.5 04/04/2014 04:48 AM                Please note that this dictation was completed with [employer], the [employer] voice recognition software. Quite often unanticipated grammatical, syntax, homophones, and other interpretative errors are inadvertently transcribed by the computer software. Please disregard these errors. Please excuse any errors that have escaped final proofreading.

## 2022-08-09 NOTE — PROGRESS NOTES
Visit Vitals  /80 (BP 1 Location: Right arm, BP Patient Position: Sitting, BP Cuff Size: Adult)   Pulse (!) 57   Resp 14   Ht 5' 6\" (1.676 m)   Wt 145 lb (65.8 kg)   SpO2 97%   BMI 23.40 kg/m²

## 2022-08-13 NOTE — PROGRESS NOTES
Patient for discharge today, will transport home with her son.        Problem: General Medical Care Plan  Goal: *Vital signs within specified parameters  3/28/2022 1332 by Kapil Morales, RN  Outcome: Resolved/Met  3/28/2022 1332 by Kapil Morales, RN  Outcome: Progressing Towards Goal  Goal: *Labs within defined limits  3/28/2022 1332 by Kapil Morales, RN  Outcome: Resolved/Met  3/28/2022 1332 by Kapil Morales, RN  Outcome: Progressing Towards Goal  Goal: *Absence of infection signs and symptoms  3/28/2022 1332 by Kapil Morales, RN  Outcome: Resolved/Met  3/28/2022 1332 by Kapil Morales, RN  Outcome: Progressing Towards Goal  Goal: *Optimal pain control at patient's stated goal  3/28/2022 1332 by Kapil Morales, RN  Outcome: Resolved/Met  3/28/2022 1332 by Kapil Morales, RN  Outcome: Progressing Towards Goal  Goal: *Skin integrity maintained  3/28/2022 1332 by Kapil Morales, RN  Outcome: Resolved/Met  3/28/2022 1332 by Kapil Morales, RN  Outcome: Progressing Towards Goal  Goal: *Fluid volume balance  3/28/2022 1332 by Kapil Morales, RN  Outcome: Resolved/Met  3/28/2022 1332 by Kapil Morales, RN  Outcome: Progressing Towards Goal  Goal: *Optimize nutritional status  3/28/2022 1332 by Kapil Morales, RN  Outcome: Resolved/Met  3/28/2022 1332 by Kapil Morales, RN  Outcome: Progressing Towards Goal  Goal: *Anxiety reduced or absent  3/28/2022 1332 by Kapil Morales, RN  Outcome: Resolved/Met  3/28/2022 1332 by Kikiil Carmen, RN  Outcome: Progressing Towards Goal  Goal: *Progressive mobility and function (eg: ADL's)  3/28/2022 1332 by Kapil Morales, RN  Outcome: Resolved/Met  3/28/2022 1332 by Kapil Morales, RN  Outcome: Progressing Towards Goal     Problem: Patient Education: Go to Patient Education Activity  Goal: Patient/Family Education  3/28/2022 1332 by Kapil Morales, RN  Outcome: Resolved/Met  3/28/2022 1332 by Kapil Morales, RN  Outcome: Progressing Towards Goal     Problem: Falls - Risk of  Goal: *Absence of Falls  Description: Document Magalie Curran Fall Risk and appropriate interventions in the flowsheet.   3/28/2022 1332 by Alexandra Smith RN  Outcome: Resolved/Met  3/28/2022 1332 by Alexandra Smith RN  Outcome: Progressing Towards Goal  Note: Fall Risk Interventions:  Mobility Interventions: Communicate number of staff needed for ambulation/transfer         Medication Interventions: Evaluate medications/consider consulting pharmacy,Patient to call before getting OOB,Teach patient to arise slowly    Elimination Interventions: Patient to call for help with toileting needs              Problem: Patient Education: Go to Patient Education Activity  Goal: Patient/Family Education  3/28/2022 1332 by Alexandra Smith RN  Outcome: Resolved/Met  3/28/2022 1332 by Alexandra Smith RN  Outcome: Progressing Towards Goal 4 = No assist / stand by assistance

## 2023-04-17 ENCOUNTER — OFFICE VISIT (OUTPATIENT)
Dept: CARDIOLOGY CLINIC | Age: 78
End: 2023-04-17
Payer: MEDICARE

## 2023-04-17 VITALS
DIASTOLIC BLOOD PRESSURE: 86 MMHG | OXYGEN SATURATION: 96 % | WEIGHT: 146 LBS | BODY MASS INDEX: 23.46 KG/M2 | RESPIRATION RATE: 16 BRPM | HEIGHT: 66 IN | HEART RATE: 88 BPM | SYSTOLIC BLOOD PRESSURE: 138 MMHG

## 2023-04-17 DIAGNOSIS — I48.0 PAROXYSMAL ATRIAL FIBRILLATION (HCC): Primary | ICD-10-CM

## 2023-04-17 PROCEDURE — 99214 OFFICE O/P EST MOD 30 MIN: CPT | Performed by: SPECIALIST

## 2023-04-17 PROCEDURE — 1101F PT FALLS ASSESS-DOCD LE1/YR: CPT | Performed by: SPECIALIST

## 2023-04-17 PROCEDURE — G9717 DOC PT DX DEP/BP F/U NT REQ: HCPCS | Performed by: SPECIALIST

## 2023-04-17 PROCEDURE — G8536 NO DOC ELDER MAL SCRN: HCPCS | Performed by: SPECIALIST

## 2023-04-17 PROCEDURE — 1123F ACP DISCUSS/DSCN MKR DOCD: CPT | Performed by: SPECIALIST

## 2023-04-17 PROCEDURE — G0463 HOSPITAL OUTPT CLINIC VISIT: HCPCS | Performed by: SPECIALIST

## 2023-04-17 PROCEDURE — G8427 DOCREV CUR MEDS BY ELIG CLIN: HCPCS | Performed by: SPECIALIST

## 2023-04-17 PROCEDURE — G8420 CALC BMI NORM PARAMETERS: HCPCS | Performed by: SPECIALIST

## 2023-04-17 PROCEDURE — 3075F SYST BP GE 130 - 139MM HG: CPT | Performed by: SPECIALIST

## 2023-04-17 PROCEDURE — G8400 PT W/DXA NO RESULTS DOC: HCPCS | Performed by: SPECIALIST

## 2023-04-17 PROCEDURE — 93005 ELECTROCARDIOGRAM TRACING: CPT | Performed by: SPECIALIST

## 2023-04-17 PROCEDURE — 1090F PRES/ABSN URINE INCON ASSESS: CPT | Performed by: SPECIALIST

## 2023-04-17 PROCEDURE — 93010 ELECTROCARDIOGRAM REPORT: CPT | Performed by: SPECIALIST

## 2023-04-17 PROCEDURE — 3079F DIAST BP 80-89 MM HG: CPT | Performed by: SPECIALIST

## 2023-04-17 RX ORDER — LORAZEPAM 1 MG/1
TABLET ORAL AS NEEDED
COMMUNITY
Start: 2023-04-05

## 2023-04-17 NOTE — PROGRESS NOTES
[address] [facility]                                                            OFFICE NOTE        Izabela Knox M.D.,BANDAR YARITZA RENDON   1945  [id number]    Date/Time:  4/17/202312:53 PM            SUBJECTIVE:  Doing ok cardiac wise  No cp or sob or palpitations  She tells me she falls about 3 times a month average       Assessment/Plan  1. Atrial fibrillation: This is paroxysmal.   Ecg today with NSR NSTT and FAV     Continue with Eliquis no untoward effects thus far. Nonetheless occasional falls of concern ( 3 week)  We have discussed watchman procedure  Proceed with formal evaluation   Continue same dose of coreg     She is aware of potential recurrent atrial fibrillation off the amiodarone nonetheless. She will let me know if any palpitations. 2.  Cardiomyopathy: Last echocardiogram with the ejection fraction 41% in March 2022. It is felt that this was probably related to combination of atrial fibrillation with rapid ventricular response therefore tachycardia mediated cardiomyopathy and with underlying left bundle branch block. Echo 6/22 with normalization of EF ! 55-60%     Ecg with no LBBB any longer     Continue with Coreg and losartan she seems to be clinically compensated at this time. To be noted that in March 2022 she is also undergone a nuclear stress test which has failed to reveal any significant ischemia. Repeat echo next OV if not done prior to that for watchman     3. HLD: closely followed by his pcp     Otherwise I will see her back in approximately 6 months or sooner if any issues        HPI     67-year-old female with the past medical history remarkable for hyperlipidemia gastroesophageal reflux disease depression hypertension who presented for cardiac evaluation of shortness of breath.               CARDIAC STUDIES        06/20/22    ECHO ADULT FOLLOW-UP OR LIMITED 08/09/2022 8/9/2022    Interpretation Summary    Left Ventricle: Low normal left ventricular systolic function with a visually estimated EF of 50 - 55%. EF by 2D Simpsons Biplane is 49%. Left ventricle size is normal. Increased wall thickness. Findings consistent with moderate concentric hypertrophy. Abnormal diastolic function. Mitral Valve: Mild regurgitation. Left Atrium: Left atrium is dilated. Left atrial volume index is severely increased (>48 mL/m2). Technical qualifiers: Echo study was technically difficult due to patient's body habitus. Signed by: Toñito Caldera MD on 8/9/2022 12:37 PM            03/24/22    NUCLEAR CARDIAC STRESS TEST 03/25/2022, 03/27/2022 3/27/2022    Interpretation Summary    ECG: Resting ECG demonstrates atrial fibrillation. Stress Test: A pharmacological stress test was performed using lexiscan. The patient reported no chest pain during the stress test.    INDICATION: Atrial fibrillation. History of hypertension    COMPARISON:  None. CORRELATIVE IMAGING STUDIES:  None    TRACER: Tc 99m Sestamibi    TECHNIQUE:  Resting SPECT images of the heart were obtained following the uneventful intravenous administration of 10.3 mCi of Tc 99m Sestamibi. Gated stress SPECT images of the heart were obtained following Lexiscan protocol and the uneventful intravenous administration of 32.5 mCi of Tc 99m Sestamibi. FINDINGS:  The rest and stress perfusion images demonstrate no significant perfusion defect or evidence of myocardial reversibility. The gated images demonstrate global hypokinesia. Left ventricular ejection fraction is 42 %. Impression:  No evidence of myocardial ischemia or infarction. Left ventricular ejection fraction is 42 %. Global hypokinesia.     Signed by: Sherman Ramos MD on 3/25/2022  3:07 PM, Signed by: Joyce Krause MD on 3/27/2022 11:00 PM                    EKG Results       None                IMAGING      MRI Results (most recent):  Results from [place] encounter on 04/10/14    MRI LUMB SPINE W WO CONT    Narrative  **Final Report**      ICD Codes / Adm. Diagnosis: 781.3  E888.9 / Lack of coordination  Unspecified  fall  Examination:  MR HERB Desai AND ROMERO CON  - [id number] - Apr 12 2014  1:46PM  Accession No:  [id number]  Reason:  muliptle falls      REPORT:  INDICATION: Recent falls    COMPARISON: None    EXAM: Sagittal T1-weighted spin-echo, sagittal T2-weighted fast spin-echo,  sagittal inversion recovery, axial T1-weighted spin-echo, axial T2-weighted  fast spin-echo, and post IV-contrast enhanced sagittal and axial  fat-suppressed T1-weighted spin echo MR images of the lumbar spine are  obtained. A total of 5.5 cc Gadavist was administered for the study. FINDINGS: Conus position, morphology, signal and enhancement are normal.  There is normal vertebral body height without evidence for fracture or  osseous edema. Mild discogenic endplate signal changes are shown at the  T10-11, T11-12 and L5-S1 levels, greatest at L5-S1. There is anatomic  alignment. No paraspinal soft tissue mass is shown. There are is a small  right paralumbar hernia containing fat and possibly a portion of colon. T10-11 T11-12 show mild disc space narrowing and central disc bulging  without facet arthropathy, canal stenosis or foraminal stenosis. T12-L1 disc and facets are normal.    L1-2 disc and facets are normal.    L2-3 shows mild disc space narrowing and leftward lateralizing diffuse disc  bulging as well as mild bilateral facet arthropathy. There is no canal  stenosis. There is mild left foraminal narrowing. L3-4 shows nearly normal disc height with minimal diffuse disc bulging and  mild to moderate bilateral facet arthropathy. There is a borderline  appearance of canal stenosis and mild left foraminal narrowing. L4-5 disc height is normal. There is minimal disc bulging and mild bilateral  facet arthropathy.  There is no canal or foraminal stenosis. L5-S1 shows severe disc space narrowing with mild central disc bulging and  bilateral facet arthropathy. There is no canal stenosis. There is mild  bilateral foraminal narrowing. Impression  :  1. Mild degenerative changes. 2. No fracture demonstrated. 3. Right paralumbar hernia. Signing/Reading Doctor: Marla Hashimoto. Jacky Quiroga ([id number])  Approved: KIET Quiroga ([id number])  Apr 12 2014  2:32PM      CT Results (most recent):  Results from Hospital Encounter encounter on 03/24/22    CT ABD PELV WO CONT    Narrative  EXAM: CT ABD PELV WO CONT    INDICATION: abd distension    COMPARISON: 2/20/2018    CONTRAST:  None. TECHNIQUE:  Thin axial images were obtained through the abdomen and pelvis. Coronal and  sagittal reformats were generated. Oral contrast was not administered. CT dose  reduction was achieved through use of a standardized protocol tailored for this  examination and automatic exposure control for dose modulation. The absence of intravenous contrast material reduces the sensitivity for  evaluation of the vasculature and solid organs. FINDINGS:  LOWER THORAX: Moderate bilateral pleural effusions. Large hiatal hernia. LIVER: No mass. BILIARY TREE: Cholecystectomy CBD is not dilated. SPLEEN: within normal limits. PANCREAS: No focal abnormality. ADRENALS: Calcified left adrenal gland  KIDNEYS/URETERS: Visualized kidneys are unremarkable  STOMACH: Unremarkable. SMALL BOWEL: No dilatation or wall thickening. COLON: Colonic diverticulosis. No evidence of diverticulitis. Colon is  unremarkable. APPENDIX: Not visualized  PERITONEUM: No free fluid or free intraperitoneal air  RETROPERITONEUM: Incompletely imaged. Atherosclerotic disease. Otherwise  unremarkable  REPRODUCTIVE ORGANS: Unremarkable  URINARY BLADDER: No mass or calculus. BONES: Degenerative changes. ABDOMINAL WALL: No mass or hernia. ADDITIONAL COMMENTS: N/A    Impression  1.   No acute intra-abdominal pathology. 2.  Bilateral pleural effusions and bilateral lower lobe volume loss. Moderate  size hiatal hernia. 3.  Calcified left adrenal gland which is likely secondary to prior trauma,  hemorrhage or inflammatory process      XR Results (most recent):  Results from Hospital Encounter encounter on 03/24/22    XR CHEST PORT    Narrative  EXAM: XR CHEST PORT    INDICATION: Dyspnea    COMPARISON: 6/20/2015. FINDINGS: A portable AP radiograph of the chest was obtained at 1244 hours. The  patient is on a cardiac monitor. There is now present mild cardiomegaly. There  are small pleural effusions. The lung volumes are low. Mild linear opacity at  the right base is consistent atelectasis. No pneumonia. No pulmonary edema. There is a small hiatal hernia. The patient is status post cervical spine surgery. Impression  1. There is mild cardiomegaly. There is blunting of the costophrenic angles  consistent with small pleural effusions. There is mild atelectasis at the right  base. No pneumonia.  No pulmonary edema          Past Medical History:   Diagnosis Date    Acute CHF (congestive heart failure) ([address] 3/25/2022    Arthritis     DJD    Autoimmune disease ([address]     psoriasis/others r/t autoimmune diseases - pt unsure of names    Cerebellar ataxia ([address]     previous hx \"cerebellar ataxia\"    Chronic pain     DDD/fibromyalgia    Fibromyalgia     fibromyalgia    GERD (gastroesophageal reflux disease)     History of degenerative disc disease     Hypercholesterolemia     Lexiscan 3-13-18 WNL EF 80% [facility]    Hypertension     Liver disease     fatty liver    Orthostasis 5/13/2014    Primary hypertension 3/26/2022    Psychiatric disorder     depression/anxiety    Skin cancer     skin cancer - squamous and basal cell    Thyroid disease      Past Surgical History:   Procedure Laterality Date    COLONOSCOPY N/A 6/12/2019    COLONOSCOPY performed by Agustina Davalos MD at [address] [place] [address] HX BUNIONECTOMY Bilateral     HX CATARACT REMOVAL Bilateral     HX CERVICAL FUSION      HX CHOLECYSTECTOMY  2005    HX GI      colonoscopy/EGD    HX HEENT Right 1972    surgery for torn retina     HX HYSTERECTOMY      HX ORTHOPAEDIC  1972    left knee surgery - for floating knee cap    HX ORTHOPAEDIC  1976    left knee cap removed    HX ORTHOPAEDIC Left     3 knee surgeries - 2450/8285/2949 - spurs removed    HX OTHER SURGICAL      numerous skin cancers removed    HX PARTIAL THYROIDECTOMY      benign tumor    HX RETINAL DETACHMENT REPAIR Right     sclera buckle     Social History     Tobacco Use    Smoking status: Former     Types: Cigarettes     Quit date: 1/19/2008     Years since quitting: 15.2    Smokeless tobacco: Never    Tobacco comments:     quit smoking cigarettes   Substance Use Topics    Alcohol use: No    Drug use: No     Family History   Problem Relation Age of Onset    Heart Surgery Brother         mitral valve replacement    Other Brother         non-Hodgkins lymphoma    Stroke Brother     Hypertension Mother     Elevated Lipids Mother     Anesth Problems Mother         \"sleeps forever after\"/\"hungover\"    Heart Failure Father     Other Father         pacemaker    Heart Disease Father     Heart Attack Nephew 55     Allergies   Allergen Reactions    Latex Other (comments)     Excoriation of skin    Latex Rash    Adhesive Other (comments)     Excoriation of skin    Adhesive Rash     Excoriates the skin. There were no vitals taken for this visit. There were no vitals filed for this visit. Review of Systems:   Pertinent items are noted in the History of Present Illness.        Visit Vitals  /86 (BP 1 Location: Left upper arm, BP Patient Position: Sitting, BP Cuff Size: Small adult)   Pulse 88   Resp 16   Ht 5' 6\" (1.676 m)   Wt 146 lb (66.2 kg)   SpO2 96%   BMI 23.57 kg/m²     General Appearance:  Well developed, well nourished,alert and oriented x 3, and individual in no acute distress. Ears/Nose/Mouth/Throat:   Hearing grossly normal.         Neck: Supple. Chest:   Lungs clear to auscultation bilaterally. Cardiovascular:  Regular rate and rhythm, S1, S2 normal, no murmur. Abdomen:   Soft, non-tender, bowel sounds are active. Extremities: No edema bilaterally. Skin: Warm and dry. Current Outpatient Medications on File Prior to Visit   Medication Sig Dispense Refill    potassium chloride SR (KLOR-CON 10) 10 mEq tablet Take 20 mEq by mouth in the morning. apixaban (ELIQUIS) 5 mg tablet Take 1 Tablet by mouth every twelve (12) hours. 28 Tablet 0    pantoprazole (PROTONIX) 40 mg tablet TAKE 1 TABLET BY MOUTH EVERY DAY FOR 90 DAYS      carvediloL (COREG) 12.5 mg tablet Take 1 Tablet by mouth two (2) times daily (with meals). 180 Tablet 1    aspirin delayed-release 81 mg tablet Take 1 Tablet by mouth every other day. 45 Tablet 1    losartan (COZAAR) 50 mg tablet Take 1 Tablet by mouth daily. 90 Tablet 1    FLUoxetine (PROzac) 40 mg capsule Take 80 mg by mouth daily. atorvastatin (LIPITOR) 20 mg tablet Take 20 mg by mouth nightly. Cholecalciferol, Vitamin D3, (VITAMIN D3) 2,000 unit cap capsule Take 2,000 Units by mouth daily. 0    cyanocobalamin (VITAMIN B-12) 1,000 mcg tablet Take 1 Tab by mouth two (2) times a day. (Patient taking differently: Take 1,000 mcg by mouth in the morning.) 60 Each 0    potassium chloride (K-DUR, KLOR-CON) 20 mEq tablet Take 20 mEq by mouth daily. No current facility-administered medications on file prior to visit. Lizzy Reyeswa had no medications administered during this visit. Current Outpatient Medications   Medication Sig    potassium chloride SR (KLOR-CON 10) 10 mEq tablet Take 20 mEq by mouth in the morning. apixaban (ELIQUIS) 5 mg tablet Take 1 Tablet by mouth every twelve (12) hours.     pantoprazole (PROTONIX) 40 mg tablet TAKE 1 TABLET BY MOUTH EVERY DAY FOR 90 DAYS    carvediloL (COREG) 12.5 mg tablet Take 1 Tablet by mouth two (2) times daily (with meals). aspirin delayed-release 81 mg tablet Take 1 Tablet by mouth every other day. losartan (COZAAR) 50 mg tablet Take 1 Tablet by mouth daily. FLUoxetine (PROzac) 40 mg capsule Take 80 mg by mouth daily. atorvastatin (LIPITOR) 20 mg tablet Take 20 mg by mouth nightly. Cholecalciferol, Vitamin D3, (VITAMIN D3) 2,000 unit cap capsule Take 2,000 Units by mouth daily. cyanocobalamin (VITAMIN B-12) 1,000 mcg tablet Take 1 Tab by mouth two (2) times a day. (Patient taking differently: Take 1,000 mcg by mouth in the morning.)    potassium chloride (K-DUR, KLOR-CON) 20 mEq tablet Take 20 mEq by mouth daily. No current facility-administered medications for this visit. Lab Results   Component Value Date/Time    Cholesterol, total 182 02/18/2016 04:05 PM    HDL Cholesterol 78 02/18/2016 04:05 PM    LDL, calculated 83 02/18/2016 04:05 PM    VLDL, calculated 21 02/18/2016 04:05 PM    Triglyceride 106 02/18/2016 04:05 PM    CHOL/HDL Ratio 2.4 05/13/2014 04:15 AM       Lab Results   Component Value Date/Time    Sodium 134 (L) 03/28/2022 05:29 AM    Potassium 4.4 03/28/2022 05:29 AM    Chloride 105 03/28/2022 05:29 AM    CO2 22 03/28/2022 05:29 AM    Anion gap 7 03/28/2022 05:29 AM    Glucose 110 (H) 03/28/2022 05:29 AM    BUN 19 03/28/2022 05:29 AM    Creatinine 1.04 (H) 03/28/2022 05:29 AM    BUN/Creatinine ratio 18 03/28/2022 05:29 AM    GFR est AA >60 03/28/2022 05:29 AM    GFR est non-AA 51 (L) 03/28/2022 05:29 AM    Calcium 8.5 03/28/2022 05:29 AM       Lab Results   Component Value Date/Time    ALT (SGPT) 13 03/25/2022 03:50 AM    Alk.  phosphatase 75 03/25/2022 03:50 AM    Bilirubin, total 1.4 (H) 03/25/2022 03:50 AM       Lab Results   Component Value Date/Time    WBC 6.9 03/25/2022 03:50 AM    HGB 11.2 (L) 03/25/2022 03:50 AM    HCT 35.8 03/25/2022 03:50 AM    PLATELET 336 05/99/1566 03:50 AM    MCV 81.9 03/25/2022 03:50 AM       Lab Results   Component Value Date/Time    TSH 1.02 03/25/2022 03:50 AM         Lab Results   Component Value Date/Time    Cholesterol, total 182 02/18/2016 04:05 PM    Cholesterol, total 168 05/13/2014 04:15 AM    Cholesterol, total 146 04/04/2014 04:48 AM    HDL Cholesterol 78 02/18/2016 04:05 PM    HDL Cholesterol 71 05/13/2014 04:15 AM    HDL Cholesterol 58 04/04/2014 04:48 AM    LDL, calculated 83 02/18/2016 04:05 PM    LDL, calculated 76.8 05/13/2014 04:15 AM    LDL, calculated 71.2 04/04/2014 04:48 AM    Triglyceride 106 02/18/2016 04:05 PM    Triglyceride 101 05/13/2014 04:15 AM    Triglyceride 84 04/04/2014 04:48 AM    CHOL/HDL Ratio 2.4 05/13/2014 04:15 AM    CHOL/HDL Ratio 2.5 04/04/2014 04:48 AM                Please note that this dictation was completed with iTagged, the [email or web address] voice recognition software. Quite often unanticipated grammatical, syntax, homophones, and other interpretative errors are inadvertently transcribed by the computer software. Please disregard these errors. Please excuse any errors that have escaped final proofreading.

## 2023-04-17 NOTE — PROGRESS NOTES
.  Chief Complaint   Patient presents with    Follow-up     6 months     Vitals:    04/17/23 1350   BP: 138/86   BP 1 Location: Left upper arm   BP Patient Position: Sitting   BP Cuff Size: Small adult   Pulse: 88   Resp: 16   Height: 5' 6\" (1.676 m)   Weight: 146 lb (66.2 kg)   SpO2: 96%     Chest pain denied   SOB denied   Palpitations denied   Swelling in hands/feet denied   Dizziness denied   Recent hospital stays denied   Refills denied

## 2023-05-20 RX ORDER — FLUOXETINE HYDROCHLORIDE 40 MG/1
80 CAPSULE ORAL DAILY
COMMUNITY

## 2023-05-20 RX ORDER — ATORVASTATIN CALCIUM 20 MG/1
1 TABLET, FILM COATED ORAL NIGHTLY
COMMUNITY

## 2023-05-20 RX ORDER — POTASSIUM CHLORIDE 20 MEQ/1
20 TABLET, EXTENDED RELEASE ORAL DAILY
COMMUNITY

## 2023-05-20 RX ORDER — ASPIRIN 81 MG/1
81 TABLET ORAL EVERY OTHER DAY
COMMUNITY
Start: 2022-04-27

## 2023-05-20 RX ORDER — CARVEDILOL 12.5 MG/1
12.5 TABLET ORAL 2 TIMES DAILY WITH MEALS
COMMUNITY
Start: 2022-06-20

## 2023-05-20 RX ORDER — POTASSIUM CHLORIDE 750 MG/1
20 TABLET, FILM COATED, EXTENDED RELEASE ORAL DAILY
COMMUNITY

## 2023-05-20 RX ORDER — LOSARTAN POTASSIUM 50 MG/1
50 TABLET ORAL DAILY
COMMUNITY
Start: 2022-04-27

## 2023-05-20 RX ORDER — LORAZEPAM 1 MG/1
TABLET ORAL PRN
COMMUNITY
Start: 2023-04-05

## 2023-05-20 RX ORDER — PANTOPRAZOLE SODIUM 40 MG/1
TABLET, DELAYED RELEASE ORAL
COMMUNITY
Start: 2022-06-02

## 2023-12-21 ENCOUNTER — TELEPHONE (OUTPATIENT)
Age: 78
End: 2023-12-21

## 2023-12-24 NOTE — ED NOTES
TRANSFER - OUT REPORT:    Verbal report given to [address] NIMISHA Quiles(name) on Alfie Martínez  being transferred to [facility](unit) for routine progression of care       Report consisted of patients Situation, Background, Assessment and   Recommendations(SBAR). Information from the following report(s) SBAR, Kardex, ED Summary, Intake/Output, MAR, Recent Results, Med Rec Status and Cardiac Rhythm A Fib was reviewed with the receiving nurse. Lines:   Peripheral IV 03/24/22 Left Antecubital (Active)   Site Assessment Clean, dry, & intact 03/24/22 1204   Phlebitis Assessment 0 03/24/22 1204   Infiltration Assessment 0 03/24/22 1204   Dressing Status Clean, dry, & intact 03/24/22 1204   Dressing Type Transparent 03/24/22 1204   Hub Color/Line Status Patent; Flushed;Capped;Pink 03/24/22 1204   Action Taken Blood drawn 03/24/22 1204   Alcohol Cap Used Yes 03/24/22 1204        Opportunity for questions and clarification was provided. Patient transported with:   Monitor  O2 @ 2 liters  Registered Nurse     1600 Dr. Amber Chairez at bedside, notified of BP and HR/rhythm. No new orders at this time.
knows her name and that she is in a hospital, no name

## (undated) DEVICE — SOLIDIFIER FLUID 3000 CC ABSORB

## (undated) DEVICE — NEONATAL-ADULT SPO2 SENSOR: Brand: NELLCOR

## (undated) DEVICE — BAG BELONG PT PERS CLEAR HANDL

## (undated) DEVICE — Z DISCONTINUED NO SUB IDED SET EXTN W/ 4 W STPCOCK M SPIN LOK 36IN

## (undated) DEVICE — SET ADMIN 16ML TBNG L100IN 2 Y INJ SITE IV PIGGY BK DISP

## (undated) DEVICE — Z DISCONTINUED NO SUB IDED BITE BLOCK ENDOSCP PEDIATRIC 38 FR SLD POLYETH BLU BLOX

## (undated) DEVICE — 1200 GUARD II KIT W/5MM TUBE W/O VAC TUBE: Brand: GUARDIAN

## (undated) DEVICE — ENDO CARRY-ON PROCEDURE KIT INCLUDES ENZYMATIC SPONGE, GAUZE, BIOHAZARD LABEL, TRAY, LUBRICANT, DIRTY SCOPE LABEL, WATER LABEL, TRAY, DRAWSTRING PAD, AND DEFENDO 4-PIECE KIT.: Brand: ENDO CARRY-ON PROCEDURE KIT

## (undated) DEVICE — CATH IV AUTOGRD BC BLU 22GA 25 -- INSYTE

## (undated) DEVICE — Z DISCONTINUED USE 2751540 TUBING IRRIG L10IN DISP PMP ENDOGATOR

## (undated) DEVICE — QUILTED PREMIUM COMFORT UNDERPAD,EXTRA HEAVY: Brand: WINGS

## (undated) DEVICE — ESOPHAGEAL/PYLORIC/COLONIC/BILIARY WIREGUIDED BALLOON DILATATION CATHETER: Brand: CRE™ PRO

## (undated) DEVICE — CANN NASAL O2 CAPNOGRAPHY AD -- FILTERLINE

## (undated) DEVICE — KIT IV STRT W CHLORAPREP PD 1ML

## (undated) DEVICE — KENDALL RADIOLUCENT FOAM MONITORING ELECTRODE -RECTANGULAR SHAPE: Brand: KENDALL

## (undated) DEVICE — SYRINGE MED 20ML STD CLR PLAS LUERLOCK TIP N CTRL DISP

## (undated) DEVICE — Device: Brand: MEDICAL ACTION INDUSTRIES

## (undated) DEVICE — BAG SPEC BIOHZD LF 2MIL 6X10IN -- CONVERT TO ITEM 357326

## (undated) DEVICE — SYR ASSEMB INFL BLLN 60ML --

## (undated) DEVICE — CONNECTOR TBNG AUX H2O JET DISP FOR OLY 160/180 SER

## (undated) DEVICE — NEEDLE HYPO 18GA L1.5IN PNK S STL HUB POLYPR SHLD REG BVL

## (undated) DEVICE — CONTAINER SPEC 20 ML LID NEUT BUFF FORMALIN 10 % POLYPR STS

## (undated) DEVICE — BW-412T DISP COMBO CLEANING BRUSH: Brand: SINGLE USE COMBINATION CLEANING BRUSH

## (undated) DEVICE — FORCEPS BX L240CM JAW DIA2.8MM L CAP W/ NDL MIC MESH TOOTH

## (undated) DEVICE — AIRLIFE™ U/CONNECT-IT OXYGEN TUBING 7 FEET (2.1 M) CRUSH-RESISTANT OXYGEN TUBING, VINYL TIPPED: Brand: AIRLIFE™

## (undated) DEVICE — Device

## (undated) DEVICE — Z CONVERTED USE 2274305 CUFF BLD PRSS AD L SZ 12 FOR 32-43CM LIMB VLY SFT W/O TUBE